# Patient Record
Sex: FEMALE | Race: WHITE | Employment: FULL TIME | URBAN - METROPOLITAN AREA
[De-identification: names, ages, dates, MRNs, and addresses within clinical notes are randomized per-mention and may not be internally consistent; named-entity substitution may affect disease eponyms.]

---

## 2017-01-18 NOTE — LETTER
3/14/2022          To Whom It May Concern:    Eda Scales is currently under my medical care and may not return to work at this time. Please excuse Jing for 3 days. She may return to work on March 17, 2022. If you require additional information please contact our office.         Sincerely,    Amrik Tellez MD          Document generated by:  Amrik Tellez MD
detailed exam

## 2017-02-01 RX ORDER — METOPROLOL SUCCINATE 25 MG/1
TABLET, EXTENDED RELEASE ORAL
Qty: 30 TABLET | Refills: 0 | Status: SHIPPED | OUTPATIENT
Start: 2017-02-01 | End: 2017-03-13

## 2017-02-06 ENCOUNTER — OFFICE VISIT (OUTPATIENT)
Dept: SURGERY | Facility: CLINIC | Age: 62
End: 2017-02-06

## 2017-02-06 VITALS
RESPIRATION RATE: 18 BRPM | HEART RATE: 91 BPM | WEIGHT: 268.44 LBS | BODY MASS INDEX: 52.7 KG/M2 | SYSTOLIC BLOOD PRESSURE: 150 MMHG | HEIGHT: 60 IN | DIASTOLIC BLOOD PRESSURE: 80 MMHG | OXYGEN SATURATION: 97 %

## 2017-02-06 DIAGNOSIS — R63.2 BINGE EATING: ICD-10-CM

## 2017-02-06 DIAGNOSIS — R73.03 PRE-DIABETES: ICD-10-CM

## 2017-02-06 DIAGNOSIS — I10 ESSENTIAL HYPERTENSION: Primary | ICD-10-CM

## 2017-02-06 DIAGNOSIS — G47.33 OSA (OBSTRUCTIVE SLEEP APNEA): ICD-10-CM

## 2017-02-06 DIAGNOSIS — Z51.81 ENCOUNTER FOR THERAPEUTIC DRUG MONITORING: ICD-10-CM

## 2017-02-06 DIAGNOSIS — E66.01 MORBID OBESITY WITH BMI OF 50.0-59.9, ADULT (HCC): ICD-10-CM

## 2017-02-06 DIAGNOSIS — E78.00 HYPERCHOLESTEROLEMIA: ICD-10-CM

## 2017-02-06 PROCEDURE — 99214 OFFICE O/P EST MOD 30 MIN: CPT | Performed by: INTERNAL MEDICINE

## 2017-02-12 ENCOUNTER — LAB ENCOUNTER (OUTPATIENT)
Dept: LAB | Facility: HOSPITAL | Age: 62
End: 2017-02-12
Attending: INTERNAL MEDICINE
Payer: COMMERCIAL

## 2017-02-12 DIAGNOSIS — I10 ESSENTIAL HYPERTENSION: ICD-10-CM

## 2017-02-12 DIAGNOSIS — R73.01 IMPAIRED FASTING BLOOD SUGAR: ICD-10-CM

## 2017-02-12 DIAGNOSIS — Z51.81 ENCOUNTER FOR THERAPEUTIC DRUG MONITORING: ICD-10-CM

## 2017-02-12 DIAGNOSIS — G47.33 OSA (OBSTRUCTIVE SLEEP APNEA): ICD-10-CM

## 2017-02-12 DIAGNOSIS — E03.9 HYPOTHYROIDISM, UNSPECIFIED TYPE: ICD-10-CM

## 2017-02-12 DIAGNOSIS — E66.01 MORBID OBESITY WITH BMI OF 50.0-59.9, ADULT (HCC): ICD-10-CM

## 2017-02-12 DIAGNOSIS — E78.00 HYPERCHOLESTEROLEMIA: ICD-10-CM

## 2017-02-12 DIAGNOSIS — R73.03 PRE-DIABETES: ICD-10-CM

## 2017-02-12 DIAGNOSIS — E78.00 HYPERCHOLESTEREMIA: ICD-10-CM

## 2017-02-12 DIAGNOSIS — R63.2 BINGE EATING: ICD-10-CM

## 2017-02-12 LAB
ALBUMIN SERPL BCP-MCNC: 3.6 G/DL (ref 3.5–4.8)
ALBUMIN/GLOB SERPL: 1.1 {RATIO} (ref 1–2)
ALP SERPL-CCNC: 62 U/L (ref 32–100)
ALT SERPL-CCNC: 23 U/L (ref 14–54)
ANION GAP SERPL CALC-SCNC: 9 MMOL/L (ref 0–18)
AST SERPL-CCNC: 21 U/L (ref 15–41)
BASOPHILS # BLD: 0 K/UL (ref 0–0.2)
BASOPHILS NFR BLD: 0 %
BILIRUB SERPL-MCNC: 1.1 MG/DL (ref 0.3–1.2)
BILIRUB UR QL: NEGATIVE
BUN SERPL-MCNC: 17 MG/DL (ref 8–20)
BUN/CREAT SERPL: 25 (ref 10–20)
CALCIUM SERPL-MCNC: 8.9 MG/DL (ref 8.5–10.5)
CHLORIDE SERPL-SCNC: 100 MMOL/L (ref 95–110)
CHOLEST SERPL-MCNC: 192 MG/DL (ref 110–200)
CLARITY UR: CLEAR
CO2 SERPL-SCNC: 32 MMOL/L (ref 22–32)
COLOR UR: YELLOW
CREAT SERPL-MCNC: 0.68 MG/DL (ref 0.5–1.5)
CREAT UR-MCNC: 37.9 MG/DL
EOSINOPHIL # BLD: 0.1 K/UL (ref 0–0.7)
EOSINOPHIL NFR BLD: 2 %
ERYTHROCYTE [DISTWIDTH] IN BLOOD BY AUTOMATED COUNT: 14.5 % (ref 11–15)
GLOBULIN PLAS-MCNC: 3.4 G/DL (ref 2.5–3.7)
GLUCOSE SERPL-MCNC: 122 MG/DL (ref 70–99)
GLUCOSE UR-MCNC: NEGATIVE MG/DL
HBA1C MFR BLD: 6.6 % (ref 4–6)
HCT VFR BLD AUTO: 41.8 % (ref 35–48)
HDLC SERPL-MCNC: 41 MG/DL
HGB BLD-MCNC: 14 G/DL (ref 12–16)
HGB UR QL STRIP.AUTO: NEGATIVE
KETONES UR-MCNC: NEGATIVE MG/DL
LDLC SERPL CALC-MCNC: 126 MG/DL (ref 0–99)
LEUKOCYTE ESTERASE UR QL STRIP.AUTO: NEGATIVE
LYMPHOCYTES # BLD: 1.9 K/UL (ref 1–4)
LYMPHOCYTES NFR BLD: 21 %
MCH RBC QN AUTO: 29.9 PG (ref 27–32)
MCHC RBC AUTO-ENTMCNC: 33.5 G/DL (ref 32–37)
MCV RBC AUTO: 89.3 FL (ref 80–100)
MICROALBUMIN UR-MCNC: 0.9 MG/DL (ref 0–1.8)
MICROALBUMIN/CREAT UR: 23.7 MG/G{CREAT} (ref 0–20)
MONOCYTES # BLD: 0.5 K/UL (ref 0–1)
MONOCYTES NFR BLD: 5 %
NEUTROPHILS # BLD AUTO: 6.4 K/UL (ref 1.8–7.7)
NEUTROPHILS NFR BLD: 72 %
NITRITE UR QL STRIP.AUTO: NEGATIVE
NONHDLC SERPL-MCNC: 151 MG/DL
OSMOLALITY UR CALC.SUM OF ELEC: 295 MOSM/KG (ref 275–295)
PH UR: 7 [PH] (ref 5–8)
PLATELET # BLD AUTO: 252 K/UL (ref 140–400)
PMV BLD AUTO: 8.5 FL (ref 7.4–10.3)
POTASSIUM SERPL-SCNC: 3.5 MMOL/L (ref 3.3–5.1)
PROT SERPL-MCNC: 7 G/DL (ref 5.9–8.4)
PROT UR-MCNC: NEGATIVE MG/DL
RBC # BLD AUTO: 4.68 M/UL (ref 3.7–5.4)
SODIUM SERPL-SCNC: 141 MMOL/L (ref 136–144)
SP GR UR STRIP: 1.01 (ref 1–1.03)
TRIGL SERPL-MCNC: 124 MG/DL (ref 1–149)
TSH SERPL-ACNC: 3.24 UIU/ML (ref 0.34–5.6)
TSH SERPL-ACNC: 3.24 UIU/ML (ref 0.34–5.6)
UROBILINOGEN UR STRIP-ACNC: <2
VIT B12 SERPL-MCNC: 1288 PG/ML (ref 181–914)
VIT C UR-MCNC: NEGATIVE MG/DL
WBC # BLD AUTO: 8.9 K/UL (ref 4–11)

## 2017-02-12 PROCEDURE — 82570 ASSAY OF URINE CREATININE: CPT

## 2017-02-12 PROCEDURE — 82306 VITAMIN D 25 HYDROXY: CPT

## 2017-02-12 PROCEDURE — 80053 COMPREHEN METABOLIC PANEL: CPT

## 2017-02-12 PROCEDURE — 82043 UR ALBUMIN QUANTITATIVE: CPT

## 2017-02-12 PROCEDURE — 36415 COLL VENOUS BLD VENIPUNCTURE: CPT

## 2017-02-12 PROCEDURE — 84443 ASSAY THYROID STIM HORMONE: CPT

## 2017-02-12 PROCEDURE — 83036 HEMOGLOBIN GLYCOSYLATED A1C: CPT

## 2017-02-12 PROCEDURE — 81003 URINALYSIS AUTO W/O SCOPE: CPT

## 2017-02-12 PROCEDURE — 80061 LIPID PANEL: CPT

## 2017-02-12 PROCEDURE — 85025 COMPLETE CBC W/AUTO DIFF WBC: CPT

## 2017-02-12 PROCEDURE — 82607 VITAMIN B-12: CPT

## 2017-02-13 ENCOUNTER — TELEPHONE (OUTPATIENT)
Dept: NEPHROLOGY | Facility: CLINIC | Age: 62
End: 2017-02-13

## 2017-02-13 LAB — 25(OH)D3 SERPL-MCNC: 48.4 NG/ML

## 2017-02-14 RX ORDER — ROSUVASTATIN CALCIUM 20 MG/1
20 TABLET, COATED ORAL DAILY
Qty: 30 TABLET | Refills: 0 | Status: SHIPPED | OUTPATIENT
Start: 2017-02-14 | End: 2017-02-14

## 2017-02-14 NOTE — TELEPHONE ENCOUNTER
Contacted pt and advised her to go back on Crestor 20 mg daily per Dr. Ayana Hammond. Pt states she did not experience side effects while taking Crestor. Confirmed pharmacy with pt. Rx sent.

## 2017-02-14 NOTE — TELEPHONE ENCOUNTER
Attempted to contact pt at the number below and we redirected to [phone]. Was on hold for 5 minutes and then got disconnected.

## 2017-02-14 NOTE — TELEPHONE ENCOUNTER
Attempted to contact pt @ [phone]. Let phone right for 2.5 minutes and no one answered. I got the automated prompt Charity [address]'s office) and then it just rings, no one picked up.

## 2017-02-15 RX ORDER — ROSUVASTATIN CALCIUM 20 MG/1
TABLET, COATED ORAL
Qty: 90 TABLET | Refills: 1 | Status: SHIPPED | OUTPATIENT
Start: 2017-02-15 | End: 2017-11-01

## 2017-02-20 ENCOUNTER — TELEPHONE (OUTPATIENT)
Dept: NEPHROLOGY | Facility: CLINIC | Age: 62
End: 2017-02-20

## 2017-02-20 NOTE — TELEPHONE ENCOUNTER
There is really no such thing. Can take a One-A-Day multiple vitamin. I see pulmonary ordered a sleep study a couple years ago. Did she ever do it. If not she should do a sleep study to rule out sleep apnea. This can cause chronic fatigue.

## 2017-02-20 NOTE — TELEPHONE ENCOUNTER
Pt called and would like to start on some type of vitamin for her to have energy. Pt stated that she feels drained.  Please call

## 2017-02-21 NOTE — TELEPHONE ENCOUNTER
Tried to call patient back at the phone# she requested but line kept ringing and no one answered. Will try later.

## 2017-02-21 NOTE — TELEPHONE ENCOUNTER
Have tried to return call to patient on the phone# she requested office to use but after a recorded message the phone rings and no one answers.

## 2017-02-24 ENCOUNTER — OFFICE VISIT (OUTPATIENT)
Dept: NEPHROLOGY | Facility: CLINIC | Age: 62
End: 2017-02-24

## 2017-02-24 VITALS
BODY MASS INDEX: 53.95 KG/M2 | HEART RATE: 97 BPM | WEIGHT: 267.63 LBS | SYSTOLIC BLOOD PRESSURE: 117 MMHG | HEIGHT: 59 IN | DIASTOLIC BLOOD PRESSURE: 76 MMHG

## 2017-02-24 DIAGNOSIS — G47.33 OBSTRUCTIVE SLEEP APNEA SYNDROME: Primary | ICD-10-CM

## 2017-02-24 DIAGNOSIS — E78.00 HYPERCHOLESTEROLEMIA: ICD-10-CM

## 2017-02-24 DIAGNOSIS — E66.9 OBESITY, UNSPECIFIED OBESITY SEVERITY, UNSPECIFIED OBESITY TYPE: ICD-10-CM

## 2017-02-24 PROCEDURE — 99212 OFFICE O/P EST SF 10 MIN: CPT | Performed by: INTERNAL MEDICINE

## 2017-02-24 PROCEDURE — 99215 OFFICE O/P EST HI 40 MIN: CPT | Performed by: INTERNAL MEDICINE

## 2017-02-25 NOTE — PROGRESS NOTES
HPI:    Patient ID: Sung Rodrigez is a 63year old female.     HPI Comments: 82-year-old female with a history of hypertension, hypercholesterolemia, borderline blood sugars, hypothyroidism, COPD, mild untreated sleep apnea, chronic low back pain with inte (VOLTAREN) 1 % Transdermal Gel Apply 2 g topically 4 (four) times daily as needed. Disp: 5 Tube Rfl: 1   selenium sulfide (SELSUN) 2.5 % Apply Externally Lotion Apply topically 2 times every week to affected area(s).  Disp: 118 mL Rfl: 10     Allergies:No K in this encounter.        Meds This Visit:  No prescriptions requested or ordered in this encounter    Imaging & Referrals:  [facility] REFERRAL DIAGOSTIC SLEEP STUDY ADULT         ID#[id number]

## 2017-03-09 ENCOUNTER — OFFICE VISIT (OUTPATIENT)
Dept: SURGERY | Facility: CLINIC | Age: 62
End: 2017-03-09

## 2017-03-09 VITALS
WEIGHT: 265.19 LBS | DIASTOLIC BLOOD PRESSURE: 78 MMHG | HEIGHT: 59 IN | SYSTOLIC BLOOD PRESSURE: 120 MMHG | BODY MASS INDEX: 53.46 KG/M2

## 2017-03-09 DIAGNOSIS — E78.00 PURE HYPERCHOLESTEROLEMIA: ICD-10-CM

## 2017-03-09 DIAGNOSIS — E66.01 MORBID OBESITY WITH BMI OF 50.0-59.9, ADULT (HCC): ICD-10-CM

## 2017-03-09 DIAGNOSIS — Z51.81 ENCOUNTER FOR THERAPEUTIC DRUG MONITORING: ICD-10-CM

## 2017-03-09 DIAGNOSIS — G47.33 OSA (OBSTRUCTIVE SLEEP APNEA): ICD-10-CM

## 2017-03-09 DIAGNOSIS — R63.2 BINGE EATING: ICD-10-CM

## 2017-03-09 DIAGNOSIS — I10 ESSENTIAL HYPERTENSION: ICD-10-CM

## 2017-03-09 DIAGNOSIS — E11.9 TYPE 2 DIABETES MELLITUS WITHOUT COMPLICATION, WITHOUT LONG-TERM CURRENT USE OF INSULIN (HCC): Primary | ICD-10-CM

## 2017-03-09 PROCEDURE — 99214 OFFICE O/P EST MOD 30 MIN: CPT | Performed by: INTERNAL MEDICINE

## 2017-03-13 RX ORDER — METOPROLOL SUCCINATE 25 MG/1
TABLET, EXTENDED RELEASE ORAL
Qty: 90 TABLET | Refills: 1 | Status: SHIPPED | OUTPATIENT
Start: 2017-03-13 | End: 2017-12-23

## 2017-03-13 RX ORDER — LEVOTHYROXINE SODIUM 0.15 MG/1
TABLET ORAL
Qty: 90 TABLET | Refills: 1 | Status: SHIPPED | OUTPATIENT
Start: 2017-03-13 | End: 2017-12-23

## 2017-03-13 RX ORDER — LOSARTAN POTASSIUM AND HYDROCHLOROTHIAZIDE 25; 100 MG/1; MG/1
TABLET ORAL
Qty: 90 TABLET | Refills: 1 | Status: SHIPPED | OUTPATIENT
Start: 2017-03-13 | End: 2017-12-23

## 2017-04-24 ENCOUNTER — TELEPHONE (OUTPATIENT)
Dept: NEPHROLOGY | Facility: CLINIC | Age: 62
End: 2017-04-24

## 2017-04-24 DIAGNOSIS — M79.672 LEFT FOOT PAIN: Primary | ICD-10-CM

## 2017-04-24 NOTE — TELEPHONE ENCOUNTER
Pt states that her left foot is very painful and she can't walk on it. Pain is from ankle down into foot. She is requesting order for xray. Please call. Aware MKK not in office.
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
## 2017-04-30 ENCOUNTER — HOSPITAL ENCOUNTER (OUTPATIENT)
Dept: GENERAL RADIOLOGY | Facility: HOSPITAL | Age: 62
Discharge: HOME | End: 2017-04-30
Attending: INTERNAL MEDICINE
Payer: COMMERCIAL

## 2017-04-30 DIAGNOSIS — M79.672 LEFT FOOT PAIN: ICD-10-CM

## 2017-04-30 PROCEDURE — 73630 X-RAY EXAM OF FOOT: CPT

## 2017-05-01 ENCOUNTER — TELEPHONE (OUTPATIENT)
Dept: NEPHROLOGY | Facility: CLINIC | Age: 62
End: 2017-05-01

## 2017-05-01 DIAGNOSIS — M77.9 BONE SPUR: Primary | ICD-10-CM

## 2017-05-02 NOTE — TELEPHONE ENCOUNTER
Patient returned call. Dr. Lory Gomez results message read. Patient states she would like to see Podiatry. Referral sent to [facility]. Phone number provided.

## 2017-05-11 ENCOUNTER — TELEPHONE (OUTPATIENT)
Dept: INTERNAL MEDICINE CLINIC | Facility: CLINIC | Age: 62
End: 2017-05-11

## 2017-05-11 DIAGNOSIS — Z01.00 DIABETIC EYE EXAM (HCC): Primary | ICD-10-CM

## 2017-05-11 DIAGNOSIS — E11.9 DIABETIC EYE EXAM (HCC): Primary | ICD-10-CM

## 2017-06-01 ENCOUNTER — OFFICE VISIT (OUTPATIENT)
Dept: SURGERY | Facility: CLINIC | Age: 62
End: 2017-06-01

## 2017-06-01 VITALS
HEIGHT: 59 IN | DIASTOLIC BLOOD PRESSURE: 88 MMHG | SYSTOLIC BLOOD PRESSURE: 130 MMHG | BODY MASS INDEX: 53.26 KG/M2 | WEIGHT: 264.19 LBS

## 2017-06-01 DIAGNOSIS — Z51.81 ENCOUNTER FOR THERAPEUTIC DRUG MONITORING: ICD-10-CM

## 2017-06-01 DIAGNOSIS — I10 ESSENTIAL HYPERTENSION: Primary | ICD-10-CM

## 2017-06-01 DIAGNOSIS — E11.9 TYPE 2 DIABETES MELLITUS WITHOUT COMPLICATION, WITHOUT LONG-TERM CURRENT USE OF INSULIN (HCC): ICD-10-CM

## 2017-06-01 DIAGNOSIS — E66.01 MORBID OBESITY WITH BMI OF 50.0-59.9, ADULT (HCC): ICD-10-CM

## 2017-06-01 DIAGNOSIS — R63.2 BINGE EATING: ICD-10-CM

## 2017-06-01 DIAGNOSIS — G47.33 OSA (OBSTRUCTIVE SLEEP APNEA): ICD-10-CM

## 2017-06-01 PROCEDURE — 99214 OFFICE O/P EST MOD 30 MIN: CPT | Performed by: INTERNAL MEDICINE

## 2017-06-01 NOTE — PROGRESS NOTES
[address] [place] [address] BARIATRIC AND WEIGHT LOSS CLINIC  [address] [place] [address]  Dept: [phone]     Date:   2016    Patient:  Jeff Gaspar  :      10/24/1955  MRN:      [id number]    Chief Complaint:  Patient presents with: BREAKFAST. Disp: 90 tablet Rfl: 1   Lisdexamfetamine Dimesylate (VYVANSE) 60 MG Oral Cap Take 1 capsule (60 mg total) by mouth every morning.  Disp: 30 capsule Rfl: 0   MetFORMIN HCl 500 MG Oral Tab Take 1 tablet (500 mg total) by mouth 2 (two) times daily Diabetes Neg    • Glaucoma Neg    • Retinal detachment Neg    • Macular degeneration Neg    • Cancer Neg        Food Journal  · Reviewed and Discussed:       · Patient has a Food Journal?: yes   · Patient is reading nutrition labels?   yes  · Average Calori organomegaly  Extremities: extremities normal, atraumatic, no cyanosis or edema  Pulses: 2+ and symmetric  Skin: Skin color, texture, turgor normal. No rashes or lesions    ASSESSMENT     HYPERTENSION:  The patient's blood pressure has been well controlled day. No fruit juices or regular soda. 3. Increase activity-upper body exercises, walk 10 minutes per day. 4. Increase fruit and vegetable servings to 5-6 per day.       Tolerating vyvanse  Will refill at 60 mg    Blood work done    johnnie tolerating well

## 2017-06-22 ENCOUNTER — TELEPHONE (OUTPATIENT)
Dept: INTERNAL MEDICINE CLINIC | Facility: CLINIC | Age: 62
End: 2017-06-22

## 2017-06-22 NOTE — TELEPHONE ENCOUNTER
Patients spouse informed is due for diabetic eye exam appointment and given [id number] for call back. States will relay message.

## 2017-08-07 ENCOUNTER — TELEPHONE (OUTPATIENT)
Dept: SURGERY | Facility: CLINIC | Age: 62
End: 2017-08-07

## 2017-08-21 ENCOUNTER — OFFICE VISIT (OUTPATIENT)
Dept: SURGERY | Facility: CLINIC | Age: 62
End: 2017-08-21

## 2017-08-21 VITALS
DIASTOLIC BLOOD PRESSURE: 90 MMHG | WEIGHT: 258.06 LBS | HEIGHT: 59 IN | SYSTOLIC BLOOD PRESSURE: 160 MMHG | BODY MASS INDEX: 52.02 KG/M2

## 2017-08-21 DIAGNOSIS — I10 ESSENTIAL HYPERTENSION: ICD-10-CM

## 2017-08-21 DIAGNOSIS — R63.2 BINGE EATING: Primary | ICD-10-CM

## 2017-08-21 DIAGNOSIS — E78.00 HYPERCHOLESTEROLEMIA: ICD-10-CM

## 2017-08-21 DIAGNOSIS — G47.33 OSA (OBSTRUCTIVE SLEEP APNEA): ICD-10-CM

## 2017-08-21 DIAGNOSIS — E66.01 MORBID OBESITY WITH BMI OF 50.0-59.9, ADULT (HCC): ICD-10-CM

## 2017-08-21 DIAGNOSIS — E11.9 TYPE 2 DIABETES MELLITUS WITHOUT COMPLICATION, WITHOUT LONG-TERM CURRENT USE OF INSULIN (HCC): ICD-10-CM

## 2017-08-21 PROCEDURE — 99214 OFFICE O/P EST MOD 30 MIN: CPT | Performed by: INTERNAL MEDICINE

## 2017-08-21 NOTE — PROGRESS NOTES
[address] BARIATRIC AND WEIGHT LOSS CLINIC  [address] [place] [address]  Dept: [phone]     Date:   2016    Patient:  Jayy Du  :      10/24/1955  MRN:      [id number]    Chief Complaint:  Patient presents with: Disp: 90 tablet Rfl: 1   LOSARTAN POTASSIUM-HCTZ 100-25 MG Oral Tab TAKE 1 TABLET BY MOUTH DAILY Disp: 90 tablet Rfl: 1   LEVOTHYROXINE SODIUM 150 MCG Oral Tab TAKE 1 TABLET BY MOUTH DAILY BEFORE BREAKFAST.  Disp: 90 tablet Rfl: 1   MetFORMIN HCl 500 MG Ora Problem Relation Age of Onset   • Hypertension Father    • Heart Disease Father    • Diabetes Neg    • Glaucoma Neg    • Retinal detachment Neg    • Macular degeneration Neg    • Cancer Neg        Food Journal  · Reviewed and Discussed:       · Patient h rhythm  Abdomen: soft, non-tender; bowel sounds normal; no masses,  no organomegaly  Extremities: extremities normal, atraumatic, no cyanosis or edema  Pulses: 2+ and symmetric  Skin: Skin color, texture, turgor normal. No rashes or lesions    ASSESSMENT log.  2. Drink 48-64 ounces of non-caloric beverages per day. No fruit juices or regular soda. 3. Increase activity-upper body exercises, walk 10 minutes per day. 4. Increase fruit and vegetable servings to 5-6 per day.       Tolerating vyvanse  Will refi

## 2017-09-21 ENCOUNTER — TELEPHONE (OUTPATIENT)
Dept: SURGERY | Facility: CLINIC | Age: 62
End: 2017-09-21

## 2017-09-21 NOTE — TELEPHONE ENCOUNTER
9/21/17 @ 4:03pm LM for pt to call PCP and request additional visits for Dr. Althea Cook appt. on 10/19/17.

## 2017-10-05 ENCOUNTER — TELEPHONE (OUTPATIENT)
Dept: NEPHROLOGY | Facility: CLINIC | Age: 62
End: 2017-10-05

## 2017-10-05 DIAGNOSIS — E66.3 OVERWEIGHT: ICD-10-CM

## 2017-10-05 DIAGNOSIS — Z12.31 VISIT FOR SCREENING MAMMOGRAM: Primary | ICD-10-CM

## 2017-10-05 DIAGNOSIS — L21.0 DANDRUFF, ADULT: ICD-10-CM

## 2017-10-05 DIAGNOSIS — E78.00 PURE HYPERCHOLESTEROLEMIA: ICD-10-CM

## 2017-10-05 DIAGNOSIS — R21 RASH OF FACE: Primary | ICD-10-CM

## 2017-10-05 NOTE — TELEPHONE ENCOUNTER
Patient contacted. Advised the lab orders, mammogram order and referrals have been entered in system. Scheduling phone3 provided and patient already has an appointment with Dr. Dawit Whitehead set up for 10/23/17.

## 2017-10-05 NOTE — TELEPHONE ENCOUNTER
Pt requesting referral to see Dermatologist re: rash on face & issues with dandruff. Pls call. Thank you.

## 2017-10-05 NOTE — TELEPHONE ENCOUNTER
Referral forms signed by Dr. Miracle Lu and sent to [facility] for insurance authorization.

## 2017-10-23 ENCOUNTER — HOSPITAL ENCOUNTER (OUTPATIENT)
Dept: MAMMOGRAPHY | Age: 62
Discharge: HOME | End: 2017-10-23
Attending: INTERNAL MEDICINE
Payer: COMMERCIAL

## 2017-10-23 DIAGNOSIS — Z12.31 VISIT FOR SCREENING MAMMOGRAM: ICD-10-CM

## 2017-10-23 PROCEDURE — 77067 SCR MAMMO BI INCL CAD: CPT | Performed by: INTERNAL MEDICINE

## 2017-10-26 ENCOUNTER — APPOINTMENT (OUTPATIENT)
Dept: LAB | Age: 62
End: 2017-10-26
Attending: INTERNAL MEDICINE
Payer: COMMERCIAL

## 2017-10-26 DIAGNOSIS — E78.00 PURE HYPERCHOLESTEROLEMIA: ICD-10-CM

## 2017-10-26 PROCEDURE — 83036 HEMOGLOBIN GLYCOSYLATED A1C: CPT

## 2017-10-26 PROCEDURE — 80061 LIPID PANEL: CPT

## 2017-10-26 PROCEDURE — 80053 COMPREHEN METABOLIC PANEL: CPT

## 2017-10-26 PROCEDURE — 36415 COLL VENOUS BLD VENIPUNCTURE: CPT

## 2017-10-29 ENCOUNTER — TELEPHONE (OUTPATIENT)
Dept: NEPHROLOGY | Facility: CLINIC | Age: 62
End: 2017-10-29

## 2017-11-01 ENCOUNTER — OFFICE VISIT (OUTPATIENT)
Dept: NEPHROLOGY | Facility: CLINIC | Age: 62
End: 2017-11-01

## 2017-11-01 VITALS
BODY MASS INDEX: 57.6 KG/M2 | HEART RATE: 102 BPM | SYSTOLIC BLOOD PRESSURE: 111 MMHG | WEIGHT: 267 LBS | DIASTOLIC BLOOD PRESSURE: 70 MMHG | HEIGHT: 57 IN

## 2017-11-01 DIAGNOSIS — E78.00 PURE HYPERCHOLESTEROLEMIA: ICD-10-CM

## 2017-11-01 DIAGNOSIS — IMO0001 CLASS 3 OBESITY IN ADULT, UNSPECIFIED BMI, UNSPECIFIED OBESITY TYPE, UNSPECIFIED WHETHER SERIOUS COMORBIDITY PRESENT: ICD-10-CM

## 2017-11-01 DIAGNOSIS — E11.9 TYPE 2 DIABETES MELLITUS WITHOUT COMPLICATION, WITHOUT LONG-TERM CURRENT USE OF INSULIN (HCC): Primary | ICD-10-CM

## 2017-11-01 PROCEDURE — 99215 OFFICE O/P EST HI 40 MIN: CPT | Performed by: INTERNAL MEDICINE

## 2017-11-01 PROCEDURE — 99212 OFFICE O/P EST SF 10 MIN: CPT | Performed by: INTERNAL MEDICINE

## 2017-11-01 RX ORDER — ROSUVASTATIN CALCIUM 20 MG/1
TABLET, COATED ORAL
Qty: 90 TABLET | Refills: 1 | Status: SHIPPED | OUTPATIENT
Start: 2017-11-01 | End: 2019-02-25

## 2017-11-02 NOTE — PATIENT INSTRUCTIONS
Decrease metformin to 500 mg daily with dinner. Resume Crestor.   In 1 month repeat your follow-up blood test.

## 2017-11-02 NOTE — PROGRESS NOTES
[facility]  Nephrology Daily Progress Note    Kendall Del Angel  [id number]  63year old  Patient presents with: Annual      HPI:   Kendall Del Angel is a 63year old female.   72-year-old female with a history of hypertension, hypercholesterolemia, adult o hydrated alert and responsive no acute distress noted  Neck/Thyroid: neck is supple without adenopathy  Lymphatic: no abnormal cervical, supraclavicular or axillary adenopathy is noted  Respiratory: normal to inspection lungs are clear to auscultation bila 1  •  LOSARTAN POTASSIUM-HCTZ 100-25 MG Oral Tab, TAKE 1 TABLET BY MOUTH DAILY, Disp: 90 tablet, Rfl: 1  •  LEVOTHYROXINE SODIUM 150 MCG Oral Tab, TAKE 1 TABLET BY MOUTH DAILY BEFORE BREAKFAST., Disp: 90 tablet, Rfl: 1  •  Insulin Pen Needle (NOVOFINE) 32G long-term current use of insulin ([facility] [address]      Weight is the same as it was back in February 2017. Reviewed recent labs from October 26, 2017. Fasting blood sugar 126 with an A1c of 6.6. Potassium 3.2. Cholesterol 228 with an LDL of 146.   Advised to resum

## 2017-11-06 ENCOUNTER — OFFICE VISIT (OUTPATIENT)
Dept: SURGERY | Facility: CLINIC | Age: 62
End: 2017-11-06

## 2017-11-06 ENCOUNTER — OFFICE VISIT (OUTPATIENT)
Dept: DERMATOLOGY CLINIC | Facility: CLINIC | Age: 62
End: 2017-11-06

## 2017-11-06 VITALS
WEIGHT: 262.56 LBS | HEIGHT: 59 IN | SYSTOLIC BLOOD PRESSURE: 130 MMHG | BODY MASS INDEX: 52.93 KG/M2 | DIASTOLIC BLOOD PRESSURE: 80 MMHG

## 2017-11-06 DIAGNOSIS — R63.2 BINGE EATING: Primary | ICD-10-CM

## 2017-11-06 DIAGNOSIS — G47.33 OSA (OBSTRUCTIVE SLEEP APNEA): ICD-10-CM

## 2017-11-06 DIAGNOSIS — E66.01 MORBID OBESITY WITH BMI OF 50.0-59.9, ADULT (HCC): ICD-10-CM

## 2017-11-06 DIAGNOSIS — E78.00 PURE HYPERCHOLESTEROLEMIA: ICD-10-CM

## 2017-11-06 DIAGNOSIS — L21.9 SEBORRHEIC DERMATITIS: Primary | ICD-10-CM

## 2017-11-06 DIAGNOSIS — Z51.81 ENCOUNTER FOR THERAPEUTIC DRUG MONITORING: ICD-10-CM

## 2017-11-06 DIAGNOSIS — L30.9 DERMATITIS: ICD-10-CM

## 2017-11-06 DIAGNOSIS — D23.9 BENIGN NEOPLASM OF SKIN, UNSPECIFIED LOCATION: ICD-10-CM

## 2017-11-06 DIAGNOSIS — I10 ESSENTIAL HYPERTENSION: ICD-10-CM

## 2017-11-06 DIAGNOSIS — E11.9 TYPE 2 DIABETES MELLITUS WITHOUT COMPLICATION, WITHOUT LONG-TERM CURRENT USE OF INSULIN (HCC): ICD-10-CM

## 2017-11-06 PROCEDURE — 99214 OFFICE O/P EST MOD 30 MIN: CPT | Performed by: NURSE PRACTITIONER

## 2017-11-06 PROCEDURE — 99213 OFFICE O/P EST LOW 20 MIN: CPT | Performed by: DERMATOLOGY

## 2017-11-06 PROCEDURE — 99212 OFFICE O/P EST SF 10 MIN: CPT | Performed by: DERMATOLOGY

## 2017-11-06 RX ORDER — KETOCONAZOLE 20 MG/ML
SHAMPOO TOPICAL
Qty: 120 ML | Refills: 3 | Status: SHIPPED | OUTPATIENT
Start: 2017-11-06 | End: 2018-07-23

## 2017-11-06 RX ORDER — HYDROCORTISONE 25 MG/ML
LOTION TOPICAL
Qty: 60 ML | Refills: 0 | Status: SHIPPED | OUTPATIENT
Start: 2017-11-06 | End: 2018-07-23

## 2017-11-06 RX ORDER — KETOCONAZOLE 20 MG/G
CREAM TOPICAL
Qty: 30 G | Refills: 3 | Status: SHIPPED | OUTPATIENT
Start: 2017-11-06 | End: 2018-07-23

## 2017-11-06 NOTE — PROGRESS NOTES
[address] BARIATRIC AND WEIGHT LOSS CLINIC  [address] [place] [address]  Dept: [phone]     Date:   17    Patient:  Oralia Alfonso  :      10/24/1955  MRN:      [id number]    Chief Complaint:  Patient presents with:   Fo MORENO MICROLET LANCETS Does not apply Misc Use 1 lancet daily to check blood sugar Disp: 100 each Rfl: 1   Rosuvastatin Calcium 20 MG Oral Tab TAKE 1 TABLET(20 MG) BY MOUTH DAILY Disp: 90 tablet Rfl: 1   MetFORMIN HCl 500 MG Oral Tab Take 1 tablet (500 m Hypertension Father    • Heart Disease Father    • Heart Disorder Mother    • Heart Disease Mother    • Diabetes Neg    • Glaucoma Neg    • Retinal detachment Neg    • Macular degeneration Neg    • Cancer Neg        Food Journal  · Reviewed and Discussed: auscultation bilaterally  Heart: S1, S2 normal, no murmur, click, rub or gallop, regular rate and rhythm  Abdomen: soft, non-tender; bowel sounds normal; no masses,  no organomegaly  Extremities: extremities normal, atraumatic, no cyanosis or edema  Pulses referral    DYSLIPIDEMIA: Stable on the above prescribed meal plan and medication. Liver function stable.       Lab Results  Component Value Date/Time   CHOLEST 228 (H) 10/26/2017 09:12 AM    (H) 10/26/2017 09:12 AM   HDL 45 10/26/2017 09:12 AM   TRI

## 2017-11-06 NOTE — PATIENT INSTRUCTIONS
Understanding Seborrheic Dermatitis    Seborrheic dermatitis is a common type of rash that causes red, scaly, greasy skin.  It occurs on skin that has oil glands, such as the face, scalp, and upper chest. It tends to last a long time, or go away and come Wash your skin gently. You can remove scales with oil and gentle rubbing or a brush. Living with seborrheic dermatitis  Seborrheic dermatitis is an ongoing (chronic) condition. It can go away and then come back.  You will likely need to use shampoo, cream,
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
## 2017-11-21 ENCOUNTER — OFFICE VISIT (OUTPATIENT)
Dept: OBGYN CLINIC | Facility: CLINIC | Age: 62
End: 2017-11-21

## 2017-11-21 VITALS
SYSTOLIC BLOOD PRESSURE: 153 MMHG | BODY MASS INDEX: 51.28 KG/M2 | HEIGHT: 60 IN | HEART RATE: 82 BPM | WEIGHT: 261.19 LBS | DIASTOLIC BLOOD PRESSURE: 79 MMHG

## 2017-11-21 DIAGNOSIS — L90.0 LICHEN SCLEROSUS: ICD-10-CM

## 2017-11-21 DIAGNOSIS — N95.0 POSTMENOPAUSAL BLEEDING: ICD-10-CM

## 2017-11-21 DIAGNOSIS — Z12.4 CERVICAL CANCER SCREENING: ICD-10-CM

## 2017-11-21 DIAGNOSIS — Z01.419 ENCOUNTER FOR GYNECOLOGICAL EXAMINATION WITHOUT ABNORMAL FINDING: Primary | ICD-10-CM

## 2017-11-21 PROCEDURE — 99396 PREV VISIT EST AGE 40-64: CPT | Performed by: OBSTETRICS & GYNECOLOGY

## 2017-11-21 PROCEDURE — 99213 OFFICE O/P EST LOW 20 MIN: CPT | Performed by: OBSTETRICS & GYNECOLOGY

## 2017-11-21 NOTE — PROGRESS NOTES
Well Woman Exam    HPI:  The patient is a 65yo female who presents for annual exam. She states that she continues to have vulvar itching. She stopped using Triamcinolone ointment and that is when the itching started.  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       Social History Main Topics   Smoking status: Former Smoker  For 7.00 Years     Types: Cigarettes    Quit date: 7/ (MORENO CONTOUR NEXT TEST) In Vitro Strip, Check blood sugar 1 time daily, Disp: 100 strip, Rfl: 1  •  MORENO MICROLET LANCETS Does not apply Misc, Use 1 lancet daily to check blood sugar, Disp: 100 each, Rfl: 1  •  Rosuvastatin Calcium 20 MG Oral Tab, TAKE nipple discharge, nipple retraction or skin changes  Abdomen:  soft, nontender, nondistended, no masses, morbid obesity   Skin/Hair: no unusual rashes or bruises  Extremities: no edema, no cyanosis  Psychiatric: Appropriate mood and affect    Pelvic Exam: three months to check lichen   5.  Follow up in 1 year

## 2017-12-04 ENCOUNTER — OFFICE VISIT (OUTPATIENT)
Dept: SURGERY | Facility: CLINIC | Age: 62
End: 2017-12-04

## 2017-12-04 VITALS
HEART RATE: 82 BPM | SYSTOLIC BLOOD PRESSURE: 128 MMHG | TEMPERATURE: 97 F | HEIGHT: 59 IN | DIASTOLIC BLOOD PRESSURE: 74 MMHG | RESPIRATION RATE: 18 BRPM | BODY MASS INDEX: 52.98 KG/M2 | WEIGHT: 262.81 LBS | OXYGEN SATURATION: 94 %

## 2017-12-04 DIAGNOSIS — E66.01 MORBID OBESITY WITH BMI OF 50.0-59.9, ADULT (HCC): ICD-10-CM

## 2017-12-04 DIAGNOSIS — E11.9 TYPE 2 DIABETES MELLITUS WITHOUT COMPLICATION, WITHOUT LONG-TERM CURRENT USE OF INSULIN (HCC): ICD-10-CM

## 2017-12-04 DIAGNOSIS — E78.00 HYPERCHOLESTEROLEMIA: ICD-10-CM

## 2017-12-04 DIAGNOSIS — G47.33 OSA (OBSTRUCTIVE SLEEP APNEA): ICD-10-CM

## 2017-12-04 DIAGNOSIS — Z51.81 ENCOUNTER FOR THERAPEUTIC DRUG MONITORING: ICD-10-CM

## 2017-12-04 DIAGNOSIS — I10 ESSENTIAL HYPERTENSION: ICD-10-CM

## 2017-12-04 DIAGNOSIS — R63.2 BINGE EATING: Primary | ICD-10-CM

## 2017-12-04 PROCEDURE — 99214 OFFICE O/P EST MOD 30 MIN: CPT | Performed by: NURSE PRACTITIONER

## 2017-12-04 NOTE — PROGRESS NOTES
[address] BARIATRIC AND WEIGHT LOSS CLINIC  [address] [place] [address]  Dept: [phone]     Date:   17    Patient:  Jacquie Matute  :      10/24/1955  MRN:      [id number]    Chief Complaint:  Patient presents with:   Fo Pen-injector Inject 3 mg into the skin daily.  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 Patient will follow up in office every 4 weeks to fulfill 6 month medical weight management requirement, in case she decides to go the surgical route. HYPERTENSION: Blood pressure stable on the above medications.  No interval krishnamurthy

## 2017-12-06 ENCOUNTER — TELEPHONE (OUTPATIENT)
Dept: OBGYN CLINIC | Facility: CLINIC | Age: 62
End: 2017-12-06

## 2017-12-06 NOTE — TELEPHONE ENCOUNTER
LMTCB with family member. Pt needs to be informed that [address] stated your most recent pap smear and HPV were negative. [address] wants to make sure tht she will call to make an appt for an annual exam in one year.

## 2017-12-21 ENCOUNTER — OFFICE VISIT (OUTPATIENT)
Dept: OBGYN CLINIC | Facility: CLINIC | Age: 62
End: 2017-12-21

## 2017-12-21 VITALS
HEART RATE: 100 BPM | WEIGHT: 261.81 LBS | SYSTOLIC BLOOD PRESSURE: 162 MMHG | BODY MASS INDEX: 53 KG/M2 | DIASTOLIC BLOOD PRESSURE: 96 MMHG

## 2017-12-21 DIAGNOSIS — Z32.00 PREGNANCY EXAMINATION OR TEST, PREGNANCY UNCONFIRMED: Primary | ICD-10-CM

## 2017-12-21 DIAGNOSIS — N95.0 POSTMENOPAUSAL BLEEDING: ICD-10-CM

## 2017-12-21 PROCEDURE — 81025 URINE PREGNANCY TEST: CPT | Performed by: OBSTETRICS & GYNECOLOGY

## 2017-12-21 PROCEDURE — 58100 BIOPSY OF UTERUS LINING: CPT | Performed by: OBSTETRICS & GYNECOLOGY

## 2017-12-22 NOTE — PROCEDURES
Endometrial Biopsy     Pre-Procedure Care:   Consent was obtained. Procedure/risks were explained. Questions were answered. Correct patient was identified. Correct side and site were confirmed. Indication: PMB    Pre-Medications:     The patient wa

## 2017-12-26 ENCOUNTER — TELEPHONE (OUTPATIENT)
Dept: NEPHROLOGY | Facility: CLINIC | Age: 62
End: 2017-12-26

## 2017-12-26 RX ORDER — METOPROLOL SUCCINATE 25 MG/1
TABLET, EXTENDED RELEASE ORAL
Qty: 90 TABLET | Refills: 1 | Status: SHIPPED | OUTPATIENT
Start: 2017-12-26 | End: 2018-10-23

## 2017-12-26 RX ORDER — LOSARTAN POTASSIUM AND HYDROCHLOROTHIAZIDE 25; 100 MG/1; MG/1
TABLET ORAL
Qty: 90 TABLET | Refills: 1 | Status: SHIPPED | OUTPATIENT
Start: 2017-12-26 | End: 2019-05-07 | Stop reason: RX

## 2017-12-26 RX ORDER — LEVOTHYROXINE SODIUM 0.15 MG/1
TABLET ORAL
Qty: 90 TABLET | Refills: 1 | Status: SHIPPED | OUTPATIENT
Start: 2017-12-26 | End: 2018-05-09

## 2017-12-26 NOTE — TELEPHONE ENCOUNTER
Pt requesting to speak with RN, pt indicates she has a cold and would like to be seen asap. Pls call pt at:[phone],thanks. *Pt informed that Dr is out of the office today, pt requesting to see another Dr or rx for her cold.

## 2017-12-26 NOTE — TELEPHONE ENCOUNTER
Patient contacted. Dr. Ed Daniel advice relayed to see IM or UC.  Patient would prefer to see Dr. Jay Edwards (she is aware he is not in the office today) so I did book an appointment with Dr. Jay Edwards for tomorrow Wednesday 12/27/17 at 3:40PM.

## 2017-12-27 ENCOUNTER — OFFICE VISIT (OUTPATIENT)
Dept: NEPHROLOGY | Facility: CLINIC | Age: 62
End: 2017-12-27

## 2017-12-27 VITALS
TEMPERATURE: 100 F | WEIGHT: 263.31 LBS | DIASTOLIC BLOOD PRESSURE: 72 MMHG | HEIGHT: 60 IN | BODY MASS INDEX: 51.7 KG/M2 | HEART RATE: 96 BPM | SYSTOLIC BLOOD PRESSURE: 127 MMHG

## 2017-12-27 DIAGNOSIS — J20.9 BRONCHITIS, ACUTE, WITH BRONCHOSPASM: Primary | ICD-10-CM

## 2017-12-27 PROCEDURE — 99212 OFFICE O/P EST SF 10 MIN: CPT | Performed by: INTERNAL MEDICINE

## 2017-12-27 PROCEDURE — 99213 OFFICE O/P EST LOW 20 MIN: CPT | Performed by: INTERNAL MEDICINE

## 2017-12-27 RX ORDER — PROMETHAZINE HYDROCHLORIDE AND CODEINE PHOSPHATE 6.25; 1 MG/5ML; MG/5ML
2.5 SYRUP ORAL EVERY 4 HOURS PRN
Qty: 180 ML | Refills: 0 | Status: SHIPPED | OUTPATIENT
Start: 2017-12-27 | End: 2018-04-09 | Stop reason: ALTCHOICE

## 2017-12-27 RX ORDER — AZITHROMYCIN 250 MG/1
TABLET, FILM COATED ORAL
Qty: 1 PACKAGE | Refills: 0 | Status: SHIPPED | OUTPATIENT
Start: 2017-12-27 | End: 2018-02-26 | Stop reason: ALTCHOICE

## 2017-12-28 ENCOUNTER — TELEPHONE (OUTPATIENT)
Dept: NEPHROLOGY | Facility: CLINIC | Age: 62
End: 2017-12-28

## 2017-12-28 RX ORDER — ALBUTEROL SULFATE 90 UG/1
2 AEROSOL, METERED RESPIRATORY (INHALATION) 4 TIMES DAILY PRN
Qty: 1 INHALER | Refills: 1 | Status: SHIPPED | OUTPATIENT
Start: 2017-12-28 | End: 2020-02-28

## 2017-12-28 NOTE — TELEPHONE ENCOUNTER
Pt states that she thought she had albuterol inhaler at home but did not have current RX so she will need new RX. Please call. Aware MKK not in office.

## 2017-12-28 NOTE — PATIENT INSTRUCTIONS
Use your albuterol inhaler as needed. Call if not better. Do your labs when you are feeling back to your usual self.

## 2017-12-28 NOTE — PROGRESS NOTES
[facility], [facility]  Nephrology Daily Progress Note    Rosa Azevedo  [id number]  63year old  Patient presents with:  Cough/URI: x 3 days      HPI:   Rosa Azevedo is a 63year old female.   63-year-old female with a history of hypertension, hypercholesterol supraclavicular or axillary adenopathy is noted  Respiratory: normal to inspection lungs are clear to auscultation bilaterally normal respiratory effort occasional  expiratory wheezes are noted.   Cardiovascular: regular rate and rhythm no murmurs, gallups, Shampoo, Apply to scalp 2 times per week., Disp: 120 mL, Rfl: 3  •  Hydrocortisone 2.5 % External Lotion, Use qd prn redness, scaling and itching on scalp,ears, Disp: 60 mL, Rfl: 0  •  MORENO MICROLET LANCETS Does not apply Misc, Use 1 lancet daily to check (obstructive sleep apnea)     Morbid obesity with BMI of 50.0-59.9, adult (HCC)     Pre-diabetes     Binge eating     Encounter for therapeutic drug monitoring     Cough     Type 2 diabetes mellitus without complication, without long-term current use of in

## 2018-01-02 ENCOUNTER — TELEPHONE (OUTPATIENT)
Dept: NEPHROLOGY | Facility: CLINIC | Age: 63
End: 2018-01-02

## 2018-01-02 RX ORDER — AZITHROMYCIN 250 MG/1
TABLET, FILM COATED ORAL
Qty: 1 PACKAGE | Refills: 0 | Status: SHIPPED | OUTPATIENT
Start: 2018-01-02 | End: 2018-02-26 | Stop reason: ALTCHOICE

## 2018-01-02 NOTE — TELEPHONE ENCOUNTER
Pt requesting to speak with RN about her medication rx:zpack/anibotic. Pt indicates she has a persistent cough and taking lozengers. Pt requesting refill or requesting to know what else  can precrib, pls call pt at:[phone],thanks.

## 2018-01-02 NOTE — TELEPHONE ENCOUNTER
Patient contacted. Advised of refill on Z Pack but needs to see Dr. Amilcar Connolly if not better after this refill. Prescription seent to [facility].

## 2018-01-08 ENCOUNTER — OFFICE VISIT (OUTPATIENT)
Dept: SURGERY | Facility: CLINIC | Age: 63
End: 2018-01-08

## 2018-01-08 VITALS
BODY MASS INDEX: 51.06 KG/M2 | WEIGHT: 260.06 LBS | HEART RATE: 92 BPM | DIASTOLIC BLOOD PRESSURE: 65 MMHG | RESPIRATION RATE: 18 BRPM | HEIGHT: 60 IN | TEMPERATURE: 98 F | SYSTOLIC BLOOD PRESSURE: 122 MMHG | OXYGEN SATURATION: 100 %

## 2018-01-08 DIAGNOSIS — I10 ESSENTIAL HYPERTENSION: ICD-10-CM

## 2018-01-08 DIAGNOSIS — R63.2 BINGE EATING: Primary | ICD-10-CM

## 2018-01-08 DIAGNOSIS — E66.01 MORBID OBESITY WITH BMI OF 50.0-59.9, ADULT (HCC): ICD-10-CM

## 2018-01-08 DIAGNOSIS — E78.00 HYPERCHOLESTEROLEMIA: ICD-10-CM

## 2018-01-08 DIAGNOSIS — Z51.81 ENCOUNTER FOR THERAPEUTIC DRUG MONITORING: ICD-10-CM

## 2018-01-08 DIAGNOSIS — G47.33 OSA (OBSTRUCTIVE SLEEP APNEA): ICD-10-CM

## 2018-01-08 DIAGNOSIS — R73.03 PRE-DIABETES: ICD-10-CM

## 2018-01-08 PROCEDURE — 99214 OFFICE O/P EST MOD 30 MIN: CPT | Performed by: NURSE PRACTITIONER

## 2018-01-08 NOTE — PROGRESS NOTES
[address] BARIATRIC AND WEIGHT LOSS CLINIC  [address] [place] [address]  Dept: [phone]     Date:   17    Patient:  Erlinda Syed  :      10/24/1955  MRN:      [id number]    Chief Complaint:  Patient presents with:   Doni Montelongo times daily as needed for Wheezing. Disp: 1 Inhaler Rfl: 1   azithromycin (ZITHROMAX Z-MYA) 250 MG Oral Tab As directed Disp: 1 Package Rfl: 0   LEVOTHYROXINE SODIUM 150 MCG Oral Tab TAKE 1 TABLET BY MOUTH DAILY BEFORE BREAKFAST.  Disp: 90 tablet Rfl: 1   L mL Rfl: 0   Blood Glucose Monitoring Suppl ([employer] CONTOUR NEXT MONITOR) w/Device Does not apply Kit 1 each by In Vitro route daily.  Check blood sugar 1 time daily Disp: 1 kit Rfl: 0   Glucose Blood (MORENO CONTOUR NEXT TEST) In Vitro Strip Check blood sugar execise    Nutritional Goals  Limit carbohydrates to 100 gms per day, Eat 100-200 calories within 1 hour of waking  and Eat 3-4 cups of fresh fruits or vegetables daily    Behavior Modifications Reviewed and Discussed  Eat breakfast, Eat 3 meals per day, P apnea has been stable since the last clinic visit. There has not been any increase in hyper-somnolence.      Encounter Diagnosis(ses):   Binge eating  (primary encounter diagnosis)  Essential hypertension  Hypercholesterolemia  [place]ene (obstructive sleep apnea) Increase Vyvanse to 70 mg. Tolerating Saxenda- since she stopped med a few weeks ago, I advised Prudence Blankenship that she may need to re-start at a lower dose to avoid GI side effects.     Blood work done- Per Pt, PCP would like her to redraw Hgb A1c, lipid panel an

## 2018-01-16 ENCOUNTER — OFFICE VISIT (OUTPATIENT)
Dept: OPHTHALMOLOGY | Facility: CLINIC | Age: 63
End: 2018-01-16

## 2018-01-16 DIAGNOSIS — H25.13 AGE-RELATED NUCLEAR CATARACT OF BOTH EYES: ICD-10-CM

## 2018-01-16 DIAGNOSIS — E11.9 TYPE 2 DIABETES MELLITUS WITHOUT RETINOPATHY (HCC): Primary | ICD-10-CM

## 2018-01-16 PROCEDURE — 99243 OFF/OP CNSLTJ NEW/EST LOW 30: CPT | Performed by: OPHTHALMOLOGY

## 2018-01-16 PROCEDURE — 99212 OFFICE O/P EST SF 10 MIN: CPT | Performed by: OPHTHALMOLOGY

## 2018-01-16 PROCEDURE — 92015 DETERMINE REFRACTIVE STATE: CPT | Performed by: OPHTHALMOLOGY

## 2018-01-16 NOTE — PROGRESS NOTES
Erlinda Syed is a 63year old female.     HPI:     HPI     Consult    Additional comments: Consult per Dr. Tim Mcbride           Diabetic Eye Exam    Additional comments: Pt has been a diabetic for 1 years  6 months on pills/ 0 years on Insulin   Pt does not Medications:    Current Outpatient Prescriptions:  Lisdexamfetamine Dimesylate (VYVANSE) 70 MG Oral Cap Take 1 capsule (70 mg total) by mouth daily.  Disp: 30 capsule Rfl: 0   Albuterol Sulfate  (90 Base) MCG/ACT Inhalation Aero Soln I 500 MG Oral Tab Take 1 tablet (500 mg total) by mouth daily with dinner. Disp: 60 tablet Rfl: 2   Diclofenac Sodium (VOLTAREN) 1 % Transdermal Gel Apply 2 g topically 4 (four) times daily as needed.  Disp: 5 Tube Rfl: 1   selenium sulfide (SELSUN) 2.5 % Nasim +0.50 090    Type:  Reading only          Manifest Refraction (Auto)       Sphere Cylinder [facility] [facility] [place]    Right +2.50 +0.25 095       Left +1.75 +0.50 075             Manifest Refraction #2       Sphere Cyl[employer] [facility] [place]

## 2018-01-16 NOTE — ASSESSMENT & PLAN NOTE
Discussed early cataracts with patient. No treatment recommended at this time. Glasses RX for distance only or progressives; patient's choice. She will continue with her current reading only glasses.

## 2018-01-16 NOTE — PATIENT INSTRUCTIONS
Type 2 diabetes mellitus without retinopathy ([facility] [address]  Diabetes type II: no background of retinopathy, no signs of neovascularization noted. Discussed ocular and systemic benefits of blood sugar control.   Diagnosis and treatment discussed in detail with angy

## 2018-02-22 ENCOUNTER — HOSPITAL ENCOUNTER (EMERGENCY)
Facility: HOSPITAL | Age: 63
Discharge: HOME | End: 2018-02-22
Attending: EMERGENCY MEDICINE
Payer: COMMERCIAL

## 2018-02-22 VITALS
HEIGHT: 60 IN | OXYGEN SATURATION: 96 % | HEART RATE: 83 BPM | SYSTOLIC BLOOD PRESSURE: 134 MMHG | TEMPERATURE: 98 F | BODY MASS INDEX: 49.08 KG/M2 | WEIGHT: 250 LBS | RESPIRATION RATE: 18 BRPM | DIASTOLIC BLOOD PRESSURE: 61 MMHG

## 2018-02-22 DIAGNOSIS — R55 SYNCOPE, VASOVAGAL: Primary | ICD-10-CM

## 2018-02-22 DIAGNOSIS — K52.9 GASTROENTERITIS: ICD-10-CM

## 2018-02-22 LAB
ALBUMIN SERPL BCP-MCNC: 3.5 G/DL (ref 3.5–4.8)
ALP SERPL-CCNC: 55 U/L (ref 32–100)
ALT SERPL-CCNC: 19 U/L (ref 14–54)
ANION GAP SERPL CALC-SCNC: 8 MMOL/L (ref 0–18)
AST SERPL-CCNC: 22 U/L (ref 15–41)
BASOPHILS # BLD: 0.1 K/UL (ref 0–0.2)
BASOPHILS NFR BLD: 1 %
BILIRUB DIRECT SERPL-MCNC: 0.2 MG/DL (ref 0–0.2)
BILIRUB SERPL-MCNC: 1.2 MG/DL (ref 0.3–1.2)
BILIRUB UR QL: NEGATIVE
BUN SERPL-MCNC: 16 MG/DL (ref 8–20)
BUN/CREAT SERPL: 18 (ref 10–20)
CALCIUM SERPL-MCNC: 8 MG/DL (ref 8.5–10.5)
CHLORIDE SERPL-SCNC: 100 MMOL/L (ref 95–110)
CO2 SERPL-SCNC: 29 MMOL/L (ref 22–32)
COLOR UR: YELLOW
CREAT SERPL-MCNC: 0.89 MG/DL (ref 0.5–1.5)
EOSINOPHIL # BLD: 0.1 K/UL (ref 0–0.7)
EOSINOPHIL NFR BLD: 1 %
ERYTHROCYTE [DISTWIDTH] IN BLOOD BY AUTOMATED COUNT: 14.8 % (ref 11–15)
GLUCOSE SERPL-MCNC: 144 MG/DL (ref 70–99)
GLUCOSE UR-MCNC: NEGATIVE MG/DL
HCT VFR BLD AUTO: 40 % (ref 35–48)
HGB BLD-MCNC: 13.6 G/DL (ref 12–16)
HGB UR QL STRIP.AUTO: NEGATIVE
KETONES UR-MCNC: NEGATIVE MG/DL
LEUKOCYTE ESTERASE UR QL STRIP.AUTO: NEGATIVE
LIPASE SERPL-CCNC: 17 U/L (ref 22–51)
LYMPHOCYTES # BLD: 0.4 K/UL (ref 1–4)
LYMPHOCYTES NFR BLD: 4 %
MCH RBC QN AUTO: 30.3 PG (ref 27–32)
MCHC RBC AUTO-ENTMCNC: 34.1 G/DL (ref 32–37)
MCV RBC AUTO: 88.9 FL (ref 80–100)
MONOCYTES # BLD: 0.4 K/UL (ref 0–1)
MONOCYTES NFR BLD: 4 %
NEUTROPHILS # BLD AUTO: 9.5 K/UL (ref 1.8–7.7)
NEUTROPHILS NFR BLD: 91 %
NITRITE UR QL STRIP.AUTO: NEGATIVE
OSMOLALITY UR CALC.SUM OF ELEC: 288 MOSM/KG (ref 275–295)
PH UR: 6 [PH] (ref 5–8)
PLATELET # BLD AUTO: 206 K/UL (ref 140–400)
PMV BLD AUTO: 8 FL (ref 7.4–10.3)
POTASSIUM SERPL-SCNC: 3.6 MMOL/L (ref 3.3–5.1)
PROT SERPL-MCNC: 6.7 G/DL (ref 5.9–8.4)
PROT UR-MCNC: NEGATIVE MG/DL
RBC # BLD AUTO: 4.49 M/UL (ref 3.7–5.4)
SODIUM SERPL-SCNC: 137 MMOL/L (ref 136–144)
SP GR UR STRIP: 1.02 (ref 1–1.03)
UROBILINOGEN UR STRIP-ACNC: <2
VIT C UR-MCNC: NEGATIVE MG/DL
WBC # BLD AUTO: 10.4 K/UL (ref 4–11)

## 2018-02-22 PROCEDURE — 80076 HEPATIC FUNCTION PANEL: CPT | Performed by: EMERGENCY MEDICINE

## 2018-02-22 PROCEDURE — 80048 BASIC METABOLIC PNL TOTAL CA: CPT | Performed by: EMERGENCY MEDICINE

## 2018-02-22 PROCEDURE — 99284 EMERGENCY DEPT VISIT MOD MDM: CPT

## 2018-02-22 PROCEDURE — 81003 URINALYSIS AUTO W/O SCOPE: CPT | Performed by: EMERGENCY MEDICINE

## 2018-02-22 PROCEDURE — 83690 ASSAY OF LIPASE: CPT | Performed by: EMERGENCY MEDICINE

## 2018-02-22 PROCEDURE — 93010 ELECTROCARDIOGRAM REPORT: CPT | Performed by: EMERGENCY MEDICINE

## 2018-02-22 PROCEDURE — 96374 THER/PROPH/DIAG INJ IV PUSH: CPT

## 2018-02-22 PROCEDURE — 85025 COMPLETE CBC W/AUTO DIFF WBC: CPT | Performed by: EMERGENCY MEDICINE

## 2018-02-22 PROCEDURE — 96361 HYDRATE IV INFUSION ADD-ON: CPT

## 2018-02-22 PROCEDURE — 93005 ELECTROCARDIOGRAM TRACING: CPT

## 2018-02-22 RX ORDER — ONDANSETRON 2 MG/ML
4 INJECTION INTRAMUSCULAR; INTRAVENOUS ONCE
Status: COMPLETED | OUTPATIENT
Start: 2018-02-22 | End: 2018-02-22

## 2018-02-22 RX ORDER — ONDANSETRON 4 MG/1
4 TABLET, FILM COATED ORAL EVERY 8 HOURS PRN
Qty: 20 TABLET | Refills: 0 | Status: ON HOLD | OUTPATIENT
Start: 2018-02-22 | End: 2018-07-12 | Stop reason: ALTCHOICE

## 2018-02-22 NOTE — ED NOTES
Pt states she developed nausea, vomiting and diarrhea lats night and she went to work this morning felling better, but all of a sudden she felt nauseated and vomited and had an syncopal episode. Pt is unsure if she had a fever last night.  Pt states she fee

## 2018-02-22 NOTE — ED PROVIDER NOTES
Patient Seen in: [facility] AND [facility] Emergency Department     History   Patient presents with:  Nausea/Vomiting/Diarrhea (gastrointestinal)  Syncope (cardiovascular, neurologic)    Stated Complaint: n/v/d syncope    HPI    63year old female complains of ana until pack is completed. See if not better. Albuterol Sulfate  (90 Base) MCG/ACT Inhalation Aero Soln,  Inhale 2 puffs into the lungs 4 (four) times daily as needed for Wheezing.    azithromycin (ZITHROMAX Z-MYA) 250 MG Oral Tab,  As directed   pro Relation Age of Onset   • Hypertension Father    • Heart Disease Father    • Heart Disorder Mother    • Heart Disease Mother    • Diabetes Neg    • Glaucoma Neg    • Retinal detachment Neg    • Macular degeneration Neg    • Cancer Neg        Smoking status breath sounds normal. No accessory muscle usage or stridor. No apnea and no tachypnea. No respiratory distress. She exhibits no retraction. Abdominal: Soft. There is no tenderness. There is no rebound and no guarding.    Musculoskeletal: Normal range of m EKG    Rate, intervals and axes as noted on EKG Report.   Rate: 86  Rhythm: Atrial rhythm  Reading: T-wave flattening           Imaging Results Available and Reviewed while in ED: No orders to display    ED Medications Administered:   Medications   onda evaluation in patients referred urgently to a [facility] hospital: the EGSYS score. Heart. 2008;94(12):1620-6. VALIDATION:  Myranda Frankel, Rudy S, Doris PENA, Darlene MORENO. Validation of EGSYS Score in Prediction of Cardiogenic Syncope.  Emerg Med Int ED treatment. overall clinical presentation including general toxicity and distal perfusion are improved. hemodynamic function is no significant change.      Diagnostic Considerations and Interpretation of abnormal or significant results:  ED Course as o leaving the department: Stable

## 2018-02-22 NOTE — ED INITIAL ASSESSMENT (HPI)
Pt had multiple episodes of v/d since last night. Went to work this morning because she was feeling better. While at work became nauseated again, co-workers called EMS and initially patient refused. Pt then developed nausea, vomited and passed out.   Pt

## 2018-02-26 ENCOUNTER — OFFICE VISIT (OUTPATIENT)
Dept: NEPHROLOGY | Facility: CLINIC | Age: 63
End: 2018-02-26

## 2018-02-26 VITALS
TEMPERATURE: 98 F | WEIGHT: 257 LBS | BODY MASS INDEX: 51.81 KG/M2 | DIASTOLIC BLOOD PRESSURE: 87 MMHG | SYSTOLIC BLOOD PRESSURE: 135 MMHG | HEIGHT: 59 IN | HEART RATE: 85 BPM

## 2018-02-26 DIAGNOSIS — J01.90 ACUTE SINUSITIS, RECURRENCE NOT SPECIFIED, UNSPECIFIED LOCATION: ICD-10-CM

## 2018-02-26 DIAGNOSIS — K52.9 GASTROENTERITIS: Primary | ICD-10-CM

## 2018-02-26 PROCEDURE — 99212 OFFICE O/P EST SF 10 MIN: CPT | Performed by: INTERNAL MEDICINE

## 2018-02-26 PROCEDURE — 99213 OFFICE O/P EST LOW 20 MIN: CPT | Performed by: INTERNAL MEDICINE

## 2018-02-26 RX ORDER — AMOXICILLIN AND CLAVULANATE POTASSIUM 875; 125 MG/1; MG/1
1 TABLET, FILM COATED ORAL 2 TIMES DAILY
Qty: 20 TABLET | Refills: 0 | Status: ON HOLD | OUTPATIENT
Start: 2018-02-26 | End: 2018-07-12 | Stop reason: ALTCHOICE

## 2018-02-26 RX ORDER — LISDEXAMFETAMINE DIMESYLATE 70 MG/1
70 CAPSULE ORAL DAILY
Refills: 0 | COMMUNITY
Start: 2017-12-21 | End: 2018-04-09 | Stop reason: ALTCHOICE

## 2018-02-27 NOTE — PROGRESS NOTES
[facility], [facility]  Nephrology Daily Progress Note    Autumn Saldaña  [id number]  63year old  Patient presents with:  ER F/U      HPI:   Autumn Saldaña is a 63year old female.   79-year-old female with a history of hypertension, hypercholesterolemia, adult o Vitals History 2/22/2018 2/22/2018 2/26/2018   /0 134/61 135/87   BP Location - - -   Patient Position - - -   Cuff Size - - -   Pulse 87 83 85   Resp 22 18 -   Temp 98.1 - 97.7   SpO2 97 96 -   Weight 250 lbs - 257 lbs   Height 60 - 59.000   BOD tablet, Rfl: 0  •  LOSARTAN POTASSIUM-HCTZ 100-25 MG Oral Tab, TAKE 1 TABLET BY MOUTH DAILY, Disp: 90 tablet, Rfl: 1  •  METOPROLOL SUCCINATE ER 25 MG Oral Tablet 24 Hr, TAKE 1 TABLET BY MOUTH DAILY, Disp: 90 tablet, Rfl: 1  •  Blood Glucose Monitoring Sup Calcium 20 MG Oral Tab, TAKE 1 TABLET(20 MG) BY MOUTH DAILY, Disp: 90 tablet, Rfl: 1  •  MetFORMIN HCl 500 MG Oral Tab, Take 1 tablet (500 mg total) by mouth daily with dinner., Disp: 60 tablet, Rfl: 2  •  selenium sulfide (SELSUN) 2.5 % Apply Externally L

## 2018-02-27 NOTE — PATIENT INSTRUCTIONS
Let me know if your symptoms do not resolve. You can start the Augmentin once you are sure your GI symptoms are better.

## 2018-04-09 ENCOUNTER — OFFICE VISIT (OUTPATIENT)
Dept: SURGERY | Facility: CLINIC | Age: 63
End: 2018-04-09

## 2018-04-09 VITALS
SYSTOLIC BLOOD PRESSURE: 152 MMHG | BODY MASS INDEX: 51.81 KG/M2 | HEART RATE: 75 BPM | OXYGEN SATURATION: 96 % | DIASTOLIC BLOOD PRESSURE: 89 MMHG | HEIGHT: 59 IN | RESPIRATION RATE: 16 BRPM | WEIGHT: 257 LBS

## 2018-04-09 DIAGNOSIS — R63.2 INCREASED APPETITE: ICD-10-CM

## 2018-04-09 DIAGNOSIS — G47.33 OSA (OBSTRUCTIVE SLEEP APNEA): ICD-10-CM

## 2018-04-09 DIAGNOSIS — R63.2 BINGE EATING: ICD-10-CM

## 2018-04-09 DIAGNOSIS — Z51.81 ENCOUNTER FOR THERAPEUTIC DRUG MONITORING: Primary | ICD-10-CM

## 2018-04-09 DIAGNOSIS — I10 ESSENTIAL HYPERTENSION: ICD-10-CM

## 2018-04-09 DIAGNOSIS — E66.01 MORBID OBESITY WITH BMI OF 50.0-59.9, ADULT (HCC): ICD-10-CM

## 2018-04-09 DIAGNOSIS — E78.00 HYPERCHOLESTEROLEMIA: ICD-10-CM

## 2018-04-09 DIAGNOSIS — E11.9 TYPE 2 DIABETES MELLITUS WITHOUT RETINOPATHY (HCC): ICD-10-CM

## 2018-04-09 PROCEDURE — 99214 OFFICE O/P EST MOD 30 MIN: CPT | Performed by: NURSE PRACTITIONER

## 2018-04-09 NOTE — PROGRESS NOTES
[address] BARIATRIC AND WEIGHT LOSS CLINIC  [address] [place] [address]  Dept: [phone]     Date:   18    Patient:  Kendall Del Angel  :      10/24/1955  MRN:      [id number]    Chief Complaint:  Patient presents with:   Robyn Louise by mouth every 8 (eight) hours as needed for Nausea. Disp: 20 tablet Rfl: 0   Albuterol Sulfate  (90 Base) MCG/ACT Inhalation Aero Soln Inhale 2 puffs into the lungs 4 (four) times daily as needed for Wheezing.  Disp: 1 Inhaler Rfl: 1   promethazine- Sodium (VOLTAREN) 1 % Transdermal Gel Apply 2 g topically 4 (four) times daily as needed. Disp: 5 Tube Rfl: 1   selenium sulfide (SELSUN) 2.5 % Apply Externally Lotion Apply topically 2 times every week to affected area(s).  Disp: 118 mL Rfl: 10     Allergi per day, Eat 100-200 calories within 1 hour of waking  and Eat 3-4 cups of fresh fruits or vegetables daily    Behavior Modifications Reviewed and Discussed  Eat breakfast, Eat 3 meals per day, Plan meals in advance, Read nutrition labels, Drink 64 oz of w not been any increase in hyper-somnolence.      Encounter Diagnosis(ses):   Encounter for therapeutic drug monitoring  (primary encounter diagnosis)  Binge eating  Essential hypertension  Hypercholesterolemia  Eugene (obstructive sleep apnea)  Type 2 diabetes

## 2018-04-26 ENCOUNTER — OFFICE VISIT (OUTPATIENT)
Dept: INTERNAL MEDICINE CLINIC | Facility: CLINIC | Age: 63
End: 2018-04-26

## 2018-04-26 ENCOUNTER — LAB ENCOUNTER (OUTPATIENT)
Dept: LAB | Age: 63
End: 2018-04-26
Attending: INTERNAL MEDICINE
Payer: COMMERCIAL

## 2018-04-26 VITALS
TEMPERATURE: 99 F | SYSTOLIC BLOOD PRESSURE: 148 MMHG | HEART RATE: 93 BPM | HEIGHT: 58.5 IN | BODY MASS INDEX: 53.33 KG/M2 | WEIGHT: 261 LBS | DIASTOLIC BLOOD PRESSURE: 97 MMHG

## 2018-04-26 DIAGNOSIS — R53.83 OTHER FATIGUE: ICD-10-CM

## 2018-04-26 DIAGNOSIS — J32.0 CHRONIC MAXILLARY SINUSITIS: Primary | ICD-10-CM

## 2018-04-26 PROCEDURE — 99213 OFFICE O/P EST LOW 20 MIN: CPT | Performed by: INTERNAL MEDICINE

## 2018-04-26 PROCEDURE — 36415 COLL VENOUS BLD VENIPUNCTURE: CPT

## 2018-04-26 PROCEDURE — 84439 ASSAY OF FREE THYROXINE: CPT

## 2018-04-26 PROCEDURE — 99212 OFFICE O/P EST SF 10 MIN: CPT | Performed by: INTERNAL MEDICINE

## 2018-04-26 PROCEDURE — 84443 ASSAY THYROID STIM HORMONE: CPT

## 2018-04-26 PROCEDURE — 80050 GENERAL HEALTH PANEL: CPT

## 2018-04-26 PROCEDURE — 80053 COMPREHEN METABOLIC PANEL: CPT

## 2018-04-26 RX ORDER — AMOXICILLIN AND CLAVULANATE POTASSIUM 875; 125 MG/1; MG/1
1 TABLET, FILM COATED ORAL 2 TIMES DAILY
Qty: 42 TABLET | Refills: 0 | Status: SHIPPED | OUTPATIENT
Start: 2018-04-26 | End: 2018-05-17
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
## 2018-05-09 ENCOUNTER — TELEPHONE (OUTPATIENT)
Dept: NEPHROLOGY | Facility: CLINIC | Age: 63
End: 2018-05-09

## 2018-05-09 DIAGNOSIS — E03.9 HYPOTHYROIDISM, UNSPECIFIED TYPE: Primary | ICD-10-CM

## 2018-05-09 RX ORDER — LEVOTHYROXINE SODIUM 175 UG/1
175 TABLET ORAL
Qty: 30 TABLET | Refills: 5 | Status: SHIPPED | OUTPATIENT
Start: 2018-05-09 | End: 2019-01-12

## 2018-05-09 NOTE — TELEPHONE ENCOUNTER
Let her know that one another doctor ordered labs I do not get copies of these reports. Her labs were good except her thyroid was mildly underactive. If on 150 mcg of Synthroid increased to 175 mcg, 1 daily, #30, refills 5.   TSH in 6 weeks

## 2018-05-09 NOTE — TELEPHONE ENCOUNTER
Pt called. Notified her that her labs were good except thyroid mildly underactive. She confirms that she has been taking levothyroxine 150 mcg daily so she will increase to 175 mcg daily and will repeat TSH in 6 weeks. Orders entered.  Also explained that i

## 2018-05-09 NOTE — TELEPHONE ENCOUNTER
Pt was wondering if MKK had a chance to review labs that were ordered by Dr Jm Carrero. Pls call. Thank you.

## 2018-05-09 NOTE — TELEPHONE ENCOUNTER
Attempted to contact pt. Work phone just rings after the [facility] [employer] recording ends. Attempted to contact cell and call was disconnected. Will try again later.

## 2018-05-29 ENCOUNTER — OFFICE VISIT (OUTPATIENT)
Dept: SURGERY | Facility: CLINIC | Age: 63
End: 2018-05-29

## 2018-05-29 VITALS
HEART RATE: 92 BPM | HEIGHT: 59 IN | BODY MASS INDEX: 51.89 KG/M2 | WEIGHT: 257.38 LBS | DIASTOLIC BLOOD PRESSURE: 93 MMHG | RESPIRATION RATE: 16 BRPM | SYSTOLIC BLOOD PRESSURE: 148 MMHG

## 2018-05-29 DIAGNOSIS — Z51.81 ENCOUNTER FOR THERAPEUTIC DRUG MONITORING: ICD-10-CM

## 2018-05-29 DIAGNOSIS — E11.9 TYPE 2 DIABETES MELLITUS WITHOUT RETINOPATHY (HCC): ICD-10-CM

## 2018-05-29 DIAGNOSIS — G47.33 OSA (OBSTRUCTIVE SLEEP APNEA): ICD-10-CM

## 2018-05-29 DIAGNOSIS — E66.01 MORBID OBESITY WITH BMI OF 50.0-59.9, ADULT (HCC): ICD-10-CM

## 2018-05-29 DIAGNOSIS — I10 ESSENTIAL HYPERTENSION: ICD-10-CM

## 2018-05-29 DIAGNOSIS — R63.2 BINGE EATING: ICD-10-CM

## 2018-05-29 DIAGNOSIS — E78.00 PURE HYPERCHOLESTEROLEMIA: Primary | ICD-10-CM

## 2018-05-29 PROCEDURE — 99214 OFFICE O/P EST MOD 30 MIN: CPT | Performed by: INTERNAL MEDICINE

## 2018-05-29 NOTE — PROGRESS NOTES
300 [facility]  300 [facility] BARIATRIC AND WEIGHT LOSS CLINIC  [address] [place] [address]  Dept: [phone]     Date:   18    Patient:  Erlinda Syed  :      10/24/1955  MRN:      [id number]    Chief Complaint:  Patient presents with:   Doni Montelongo apply Misc Use a new needle with each use Disp: 1 Box Rfl: 2   Lisdexamfetamine Dimesylate (VYVANSE) 70 MG Oral Cap Take 1 capsule (70 mg total) by mouth daily.  Disp: 30 capsule Rfl: 0   Amoxicillin-Pot Clavulanate 875-125 MG Oral Tab Take 1 tablet by mout (VOLTAREN) 1 % Transdermal Gel Apply 2 g topically 4 (four) times daily as needed. Disp: 5 Tube Rfl: 1   selenium sulfide (SELSUN) 2.5 % Apply Externally Lotion Apply topically 2 times every week to affected area(s).  Disp: 118 mL Rfl: 10     Allergies:  Pa Eat 100-200 calories within 1 hour of waking  and Eat 3-4 cups of fresh fruits or vegetables daily    Behavior Modifications Reviewed and Discussed  Eat breakfast, Eat 3 meals per day, Plan meals in advance, Read nutrition labels, Drink 64 oz of water per any increase in hyper-somnolence.      Encounter Diagnosis(ses):   Pure hypercholesterolemia  (primary encounter diagnosis)  Essential hypertension  Eugene (obstructive sleep apnea)  Type 2 diabetes mellitus without retinopathy (hcc)  Binge eating  Encounter f

## 2018-06-25 ENCOUNTER — TELEPHONE (OUTPATIENT)
Dept: NEPHROLOGY | Facility: CLINIC | Age: 63
End: 2018-06-25

## 2018-06-25 DIAGNOSIS — L21.0 DANDRUFF IN ADULT: Primary | ICD-10-CM

## 2018-06-25 NOTE — TELEPHONE ENCOUNTER
Pt called to request referral to Dr. Kwame Killian for problem with dandruff. Appt not scheduled yet. Please call.

## 2018-07-11 ENCOUNTER — TELEPHONE (OUTPATIENT)
Dept: NEPHROLOGY | Facility: CLINIC | Age: 63
End: 2018-07-11

## 2018-07-11 NOTE — TELEPHONE ENCOUNTER
Pt was offered appt 7/27, pt indicates she has not been feeling well and would like to discuss with rn, pt has a pinching on her left side chest, near breast (daily), pls call at:[phone],thanks.   *Pt has goggled symptoms and indicates it pertains to t

## 2018-07-12 ENCOUNTER — HOSPITAL ENCOUNTER (OUTPATIENT)
Facility: HOSPITAL | Age: 63
Setting detail: OBSERVATION
Discharge: HOME | End: 2018-07-14
Attending: EMERGENCY MEDICINE | Admitting: HOSPITALIST
Payer: COMMERCIAL

## 2018-07-12 ENCOUNTER — APPOINTMENT (OUTPATIENT)
Dept: GENERAL RADIOLOGY | Facility: HOSPITAL | Age: 63
End: 2018-07-12
Attending: EMERGENCY MEDICINE
Payer: COMMERCIAL

## 2018-07-12 DIAGNOSIS — R07.9 ACUTE CHEST PAIN: Primary | ICD-10-CM

## 2018-07-12 PROBLEM — R73.9 HYPERGLYCEMIA: Status: ACTIVE | Noted: 2018-07-12

## 2018-07-12 PROBLEM — R79.89 AZOTEMIA: Status: ACTIVE | Noted: 2018-07-12

## 2018-07-12 LAB
ANION GAP SERPL CALC-SCNC: 10 MMOL/L (ref 0–18)
BASOPHILS # BLD: 0.1 K/UL (ref 0–0.2)
BASOPHILS NFR BLD: 1 %
BILIRUB UR QL: NEGATIVE
BNP SERPL-MCNC: 24 PG/ML (ref 0–100)
BUN SERPL-MCNC: 19 MG/DL (ref 8–20)
BUN/CREAT SERPL: 24.7 (ref 10–20)
CALCIUM SERPL-MCNC: 8.6 MG/DL (ref 8.5–10.5)
CHLORIDE SERPL-SCNC: 102 MMOL/L (ref 95–110)
CLARITY UR: CLEAR
CO2 SERPL-SCNC: 24 MMOL/L (ref 22–32)
COLOR UR: YELLOW
CREAT SERPL-MCNC: 0.77 MG/DL (ref 0.5–1.5)
D DIMER PPP FEU-MCNC: <0.27 MCG/ML (ref ?–0.6)
EOSINOPHIL # BLD: 0.1 K/UL (ref 0–0.7)
EOSINOPHIL NFR BLD: 1 %
ERYTHROCYTE [DISTWIDTH] IN BLOOD BY AUTOMATED COUNT: 14.1 % (ref 11–15)
GLUCOSE BLDC GLUCOMTR-MCNC: 106 MG/DL (ref 70–99)
GLUCOSE BLDC GLUCOMTR-MCNC: 111 MG/DL (ref 70–99)
GLUCOSE BLDC GLUCOMTR-MCNC: 130 MG/DL (ref 70–99)
GLUCOSE SERPL-MCNC: 216 MG/DL (ref 70–99)
GLUCOSE UR-MCNC: NEGATIVE MG/DL
HCT VFR BLD AUTO: 41.1 % (ref 35–48)
HGB BLD-MCNC: 13.8 G/DL (ref 12–16)
HGB UR QL STRIP.AUTO: NEGATIVE
KETONES UR-MCNC: NEGATIVE MG/DL
LEUKOCYTE ESTERASE UR QL STRIP.AUTO: NEGATIVE
LYMPHOCYTES # BLD: 1.5 K/UL (ref 1–4)
LYMPHOCYTES NFR BLD: 15 %
MCH RBC QN AUTO: 30.1 PG (ref 27–32)
MCHC RBC AUTO-ENTMCNC: 33.6 G/DL (ref 32–37)
MCV RBC AUTO: 89.6 FL (ref 80–100)
MONOCYTES # BLD: 0.5 K/UL (ref 0–1)
MONOCYTES NFR BLD: 5 %
NEUTROPHILS # BLD AUTO: 8 K/UL (ref 1.8–7.7)
NEUTROPHILS NFR BLD: 78 %
NITRITE UR QL STRIP.AUTO: NEGATIVE
OSMOLALITY UR CALC.SUM OF ELEC: 291 MOSM/KG (ref 275–295)
PH UR: 5 [PH] (ref 5–8)
PLATELET # BLD AUTO: 250 K/UL (ref 140–400)
PMV BLD AUTO: 8.2 FL (ref 7.4–10.3)
POTASSIUM SERPL-SCNC: 3.6 MMOL/L (ref 3.3–5.1)
PROT UR-MCNC: NEGATIVE MG/DL
RBC # BLD AUTO: 4.59 M/UL (ref 3.7–5.4)
SODIUM SERPL-SCNC: 136 MMOL/L (ref 136–144)
SP GR UR STRIP: 1.02 (ref 1–1.03)
TROPONIN I SERPL-MCNC: 0 NG/ML (ref ?–0.03)
UROBILINOGEN UR STRIP-ACNC: <2
VIT C UR-MCNC: NEGATIVE MG/DL
WBC # BLD AUTO: 10.2 K/UL (ref 4–11)

## 2018-07-12 PROCEDURE — 99220 INITIAL OBSERVATION CARE,LEVL III: CPT | Performed by: HOSPITALIST

## 2018-07-12 PROCEDURE — 71045 X-RAY EXAM CHEST 1 VIEW: CPT | Performed by: EMERGENCY MEDICINE

## 2018-07-12 RX ORDER — METOPROLOL SUCCINATE 25 MG/1
25 TABLET, EXTENDED RELEASE ORAL DAILY
Status: DISCONTINUED | OUTPATIENT
Start: 2018-07-13 | End: 2018-07-14

## 2018-07-12 RX ORDER — POTASSIUM CHLORIDE 20 MEQ/1
40 TABLET, EXTENDED RELEASE ORAL EVERY 4 HOURS
Status: COMPLETED | OUTPATIENT
Start: 2018-07-12 | End: 2018-07-12

## 2018-07-12 RX ORDER — SODIUM PHOSPHATE, DIBASIC AND SODIUM PHOSPHATE, MONOBASIC 7; 19 G/133ML; G/133ML
1 ENEMA RECTAL ONCE AS NEEDED
Status: DISCONTINUED | OUTPATIENT
Start: 2018-07-12 | End: 2018-07-14

## 2018-07-12 RX ORDER — POLYETHYLENE GLYCOL 3350 17 G/17G
17 POWDER, FOR SOLUTION ORAL DAILY PRN
Status: DISCONTINUED | OUTPATIENT
Start: 2018-07-12 | End: 2018-07-14

## 2018-07-12 RX ORDER — ASPIRIN 81 MG/1
324 TABLET, CHEWABLE ORAL ONCE
Status: COMPLETED | OUTPATIENT
Start: 2018-07-12 | End: 2018-07-12

## 2018-07-12 RX ORDER — LOSARTAN POTASSIUM AND HYDROCHLOROTHIAZIDE 25; 100 MG/1; MG/1
1 TABLET ORAL
Status: DISCONTINUED | OUTPATIENT
Start: 2018-07-12 | End: 2018-07-12

## 2018-07-12 RX ORDER — ASPIRIN 325 MG
325 TABLET ORAL DAILY
Status: DISCONTINUED | OUTPATIENT
Start: 2018-07-12 | End: 2018-07-14

## 2018-07-12 RX ORDER — NITROGLYCERIN 0.4 MG/1
0.4 TABLET SUBLINGUAL EVERY 5 MIN PRN
Status: DISCONTINUED | OUTPATIENT
Start: 2018-07-12 | End: 2018-07-14

## 2018-07-12 RX ORDER — DOCUSATE SODIUM 100 MG/1
100 CAPSULE, LIQUID FILLED ORAL 2 TIMES DAILY
Status: DISCONTINUED | OUTPATIENT
Start: 2018-07-12 | End: 2018-07-14

## 2018-07-12 RX ORDER — LEVOTHYROXINE SODIUM 175 UG/1
175 TABLET ORAL
Status: DISCONTINUED | OUTPATIENT
Start: 2018-07-13 | End: 2018-07-14

## 2018-07-12 RX ORDER — ACETAMINOPHEN 325 MG/1
650 TABLET ORAL EVERY 6 HOURS PRN
Status: DISCONTINUED | OUTPATIENT
Start: 2018-07-12 | End: 2018-07-14

## 2018-07-12 RX ORDER — BISACODYL 10 MG
10 SUPPOSITORY, RECTAL RECTAL
Status: DISCONTINUED | OUTPATIENT
Start: 2018-07-12 | End: 2018-07-14

## 2018-07-12 RX ORDER — DEXTROSE MONOHYDRATE 25 G/50ML
50 INJECTION, SOLUTION INTRAVENOUS AS NEEDED
Status: DISCONTINUED | OUTPATIENT
Start: 2018-07-12 | End: 2018-07-14

## 2018-07-12 RX ORDER — LEVOTHYROXINE SODIUM 175 UG/1
175 TABLET ORAL
Status: DISCONTINUED | OUTPATIENT
Start: 2018-07-13 | End: 2018-07-12

## 2018-07-12 RX ORDER — HEPARIN SODIUM 5000 [USP'U]/ML
5000 INJECTION, SOLUTION INTRAVENOUS; SUBCUTANEOUS EVERY 12 HOURS SCHEDULED
Status: DISCONTINUED | OUTPATIENT
Start: 2018-07-12 | End: 2018-07-14

## 2018-07-12 RX ORDER — SODIUM CHLORIDE 0.9 % (FLUSH) 0.9 %
3 SYRINGE (ML) INJECTION AS NEEDED
Status: DISCONTINUED | OUTPATIENT
Start: 2018-07-12 | End: 2018-07-14

## 2018-07-12 RX ORDER — ONDANSETRON 2 MG/ML
4 INJECTION INTRAMUSCULAR; INTRAVENOUS EVERY 6 HOURS PRN
Status: DISCONTINUED | OUTPATIENT
Start: 2018-07-12 | End: 2018-07-14

## 2018-07-12 RX ORDER — ALBUTEROL SULFATE 90 UG/1
2 AEROSOL, METERED RESPIRATORY (INHALATION) 4 TIMES DAILY PRN
Status: DISCONTINUED | OUTPATIENT
Start: 2018-07-12 | End: 2018-07-14

## 2018-07-12 RX ORDER — ROSUVASTATIN CALCIUM 20 MG/1
20 TABLET, COATED ORAL NIGHTLY
Status: DISCONTINUED | OUTPATIENT
Start: 2018-07-12 | End: 2018-07-14

## 2018-07-12 NOTE — ED INITIAL ASSESSMENT (HPI)
Pt states \"left-sided chest pinching\" x 1 week- states SOB with hx COPD and states, \"I can't pinpoint if this is it. \" Pt also states \"the blood flow to my left lower leg stopped flowing correctly and I am worried about a blood clot. \" + pedal pulses.

## 2018-07-12 NOTE — ED NOTES
Presents to ER for c/o several brief episodes of non-radiating L sided chest pain over the past 5 days. Also c/o feeling fatigued. Denies recent illness. Hx of COPD, denies worsening shortness of breath. No active chest discomfort at this time.  Will contin

## 2018-07-12 NOTE — RESPIRATORY THERAPY NOTE
ROGELIO ASSESSMENT:    Pt does not have a previous diagnosis of ROGELIO. Pt does not routinely use a CPAP device at home. patient refused CPAP

## 2018-07-12 NOTE — ED PROVIDER NOTES
Patient Seen in: [facility] AND [facility] Emergency Department    History   Patient presents with:  Chest Pain Angina (cardiovascular)    Stated Complaint: Chest Pain x1week    HPI    Patient presents emergency department complaining of chest pain.   She describ noted in HPI. Constitutional and vital signs reviewed. All other systems reviewed and negative except as noted above.     Physical Exam   ED Triage Vitals [07/12/18 1045]  BP: 156/79  Pulse: 96  Resp: 20  Temp: 98.2 °F (36.8 °C)  Temp src: Oral  SpO2: -----------         ------                     CBC W/ DIFFERENTIAL[[id number]]          Abnormal            Final result                 Please view results for these tests on the individual orders.    URINALYSIS WITH CULTURE REFLEX   TROPONIN I

## 2018-07-12 NOTE — TELEPHONE ENCOUNTER
Contacted pt. She has had intermittent \"pinching\" and \"sharp needle\"-like pain near left breast for a couple weeks. Over the past week, these episodes have become more frequent and are occurring daily.  It happened 2 times 2 days ago and 3 times yesterd

## 2018-07-12 NOTE — H&P
[facility]    PATIENT'S NAME: Oksana Treadwell PHYSICIAN: Noemi Stewart MD   PATIENT ACCOUNT#:   [id number]    LOCATION:  [place] [address]  MEDICAL RECORD #:   [id number]       YOB: 1955  ADMISSION DATE:       07/12/20 mucous membranes. Normal hard and soft palate. NECK:  Trachea midline. Full range of motion. Supple. No thyromegaly or lymphadenopathy. LUNGS:  Clear to auscultation bilaterally. Normal respiratory effort. No intercostal retractions.    HEART:  Re

## 2018-07-13 ENCOUNTER — APPOINTMENT (OUTPATIENT)
Dept: CV DIAGNOSTICS | Facility: HOSPITAL | Age: 63
End: 2018-07-13
Attending: HOSPITALIST
Payer: COMMERCIAL

## 2018-07-13 ENCOUNTER — APPOINTMENT (OUTPATIENT)
Dept: NUCLEAR MEDICINE | Facility: HOSPITAL | Age: 63
End: 2018-07-13
Attending: HOSPITALIST
Payer: COMMERCIAL

## 2018-07-13 LAB
ANION GAP SERPL CALC-SCNC: 7 MMOL/L (ref 0–18)
BASOPHILS # BLD: 0 K/UL (ref 0–0.2)
BASOPHILS NFR BLD: 1 %
BUN SERPL-MCNC: 17 MG/DL (ref 8–20)
BUN/CREAT SERPL: 24.3 (ref 10–20)
CALCIUM SERPL-MCNC: 8.5 MG/DL (ref 8.5–10.5)
CHLORIDE SERPL-SCNC: 103 MMOL/L (ref 95–110)
CHOLEST SERPL-MCNC: 164 MG/DL (ref 110–200)
CO2 SERPL-SCNC: 28 MMOL/L (ref 22–32)
CREAT SERPL-MCNC: 0.7 MG/DL (ref 0.5–1.5)
EOSINOPHIL # BLD: 0.2 K/UL (ref 0–0.7)
EOSINOPHIL NFR BLD: 3 %
ERYTHROCYTE [DISTWIDTH] IN BLOOD BY AUTOMATED COUNT: 14.4 % (ref 11–15)
GLUCOSE BLDC GLUCOMTR-MCNC: 120 MG/DL (ref 70–99)
GLUCOSE BLDC GLUCOMTR-MCNC: 126 MG/DL (ref 70–99)
GLUCOSE BLDC GLUCOMTR-MCNC: 94 MG/DL (ref 70–99)
GLUCOSE SERPL-MCNC: 140 MG/DL (ref 70–99)
HBA1C MFR BLD: 7 % (ref 4–6)
HCT VFR BLD AUTO: 39 % (ref 35–48)
HDLC SERPL-MCNC: 34 MG/DL
HGB BLD-MCNC: 13.2 G/DL (ref 12–16)
INR BLD: 1 (ref 0.9–1.2)
LDLC SERPL CALC-MCNC: 83 MG/DL (ref 0–99)
LYMPHOCYTES # BLD: 2.1 K/UL (ref 1–4)
LYMPHOCYTES NFR BLD: 26 %
MCH RBC QN AUTO: 30 PG (ref 27–32)
MCHC RBC AUTO-ENTMCNC: 33.9 G/DL (ref 32–37)
MCV RBC AUTO: 88.5 FL (ref 80–100)
MONOCYTES # BLD: 0.5 K/UL (ref 0–1)
MONOCYTES NFR BLD: 6 %
NEUTROPHILS # BLD AUTO: 5.2 K/UL (ref 1.8–7.7)
NEUTROPHILS NFR BLD: 65 %
NONHDLC SERPL-MCNC: 130 MG/DL
OSMOLALITY UR CALC.SUM OF ELEC: 290 MOSM/KG (ref 275–295)
PLATELET # BLD AUTO: 258 K/UL (ref 140–400)
PMV BLD AUTO: 8.5 FL (ref 7.4–10.3)
POTASSIUM SERPL-SCNC: 4 MMOL/L (ref 3.3–5.1)
POTASSIUM SERPL-SCNC: 4 MMOL/L (ref 3.3–5.1)
PROTHROMBIN TIME: 13 SECONDS (ref 11.8–14.5)
RBC # BLD AUTO: 4.41 M/UL (ref 3.7–5.4)
SODIUM SERPL-SCNC: 138 MMOL/L (ref 136–144)
TRIGL SERPL-MCNC: 233 MG/DL (ref 1–149)
WBC # BLD AUTO: 8 K/UL (ref 4–11)

## 2018-07-13 PROCEDURE — 99226 SUBSEQUENT OBSERVATION CARE: CPT | Performed by: HOSPITALIST

## 2018-07-13 PROCEDURE — 93017 CV STRESS TEST TRACING ONLY: CPT | Performed by: HOSPITALIST

## 2018-07-13 PROCEDURE — 78452 HT MUSCLE IMAGE SPECT MULT: CPT | Performed by: HOSPITALIST

## 2018-07-13 RX ORDER — ASPIRIN 81 MG/1
324 TABLET, CHEWABLE ORAL ONCE
Status: DISCONTINUED | OUTPATIENT
Start: 2018-07-13 | End: 2018-07-14

## 2018-07-13 RX ORDER — CHLORHEXIDINE GLUCONATE 4 G/100ML
30 SOLUTION TOPICAL
Status: COMPLETED | OUTPATIENT
Start: 2018-07-14 | End: 2018-07-14

## 2018-07-13 RX ORDER — 0.9 % SODIUM CHLORIDE 0.9 %
VIAL (ML) INJECTION
Status: COMPLETED
Start: 2018-07-13 | End: 2018-07-13

## 2018-07-13 RX ORDER — SODIUM CHLORIDE 9 MG/ML
INJECTION, SOLUTION INTRAVENOUS CONTINUOUS
Status: DISCONTINUED | OUTPATIENT
Start: 2018-07-13 | End: 2018-07-14

## 2018-07-13 NOTE — PROGRESS NOTES
[facility]    Progress Note    Khurram [place] Patient Status:  Observation    10/24/1955 MRN [id number]   Location [facility] 3W/SW Attending Krishan Landry MD   Hosp Day # 0 PCP Yolanda Parikh MD       SUBJECTIVE:  Feeling f Flush 0.9 % injection 3 mL 3 mL Intravenous PRN   Heparin Sodium (Porcine) 5000 UNIT/ML injection 5,000 Units 5,000 Units Subcutaneous 2 times per day   acetaminophen (TYLENOL) tab 650 mg 650 mg Oral Q6H PRN   ondansetron HCl (ZOFRAN) injection 4 mg 4 mg I Ulnar impaction syndrome     ROGELIO (obstructive sleep apnea)     Morbid obesity with BMI of 50.0-59.9, adult (HCC)     Pre-diabetes     Binge eating     Encounter for therapeutic drug monitoring     Cough     Type 2 diabetes mellitus without retinopathy ([facility]

## 2018-07-13 NOTE — RESPIRATORY THERAPY NOTE
ROGELIO ASSESSMENT:    Pt does not have a previous diagnosis of ROGELIO. Pt does not routinely use a CPAP device at home. This pt is suspected to be at high risk for ROGELIO and sleep lab packet was provided to patient for outpatient follow-up.     RECOMENDATIONS:    C

## 2018-07-13 NOTE — CONSULTS
[facility] AND [facility]  [facility] Cardiology Consult Note    Nabil Daniel Patient Status:  Observation    10/24/1955 MRN [id number]   [facility] 3W/SW Attending Janay Becker MD   Hosp Day # 0 PCP Thersia Gaucher, MD     63year old Female, antihypertensives as noted above    ADD  - takes Adderall      Risks/benefits of cath discussed,including 1/100 chance of MI stroke or death patient is agreeable   No Dye allergy  Have d/w internationalist,  Orders placed, on the schedule for this PM 69  Resp: 20  BP: 143/60    Intake/Output:     Intake/Output Summary (Last 24 hours) at 07/13/18 1548  Last data filed at 07/13/18 0914   Gross per 24 hour   Intake              480 ml   Output              350 ml   Net              130 ml         Asif

## 2018-07-13 NOTE — DIABETES ED
[facility] HOSP - [facility]   Diabetes Education Note      Hugo Mosquera Patient Status Observation   10/24/1955  MRN [id number]  Location  [facility] 3W/SW  Attending Peewee Davis MD  Hospital Days # 0  PCP  Julissa Felix MD    Reason for

## 2018-07-14 ENCOUNTER — APPOINTMENT (OUTPATIENT)
Dept: INTERVENTIONAL RADIOLOGY/VASCULAR | Facility: HOSPITAL | Age: 63
End: 2018-07-14
Attending: NURSE PRACTITIONER
Payer: COMMERCIAL

## 2018-07-14 ENCOUNTER — PRIOR ORIGINAL RECORDS (OUTPATIENT)
Dept: OTHER | Age: 63
End: 2018-07-14

## 2018-07-14 VITALS
TEMPERATURE: 98 F | DIASTOLIC BLOOD PRESSURE: 89 MMHG | OXYGEN SATURATION: 97 % | HEART RATE: 83 BPM | SYSTOLIC BLOOD PRESSURE: 169 MMHG | WEIGHT: 258.13 LBS | HEIGHT: 59 IN | RESPIRATION RATE: 16 BRPM | BODY MASS INDEX: 52.04 KG/M2

## 2018-07-14 LAB
ANION GAP SERPL CALC-SCNC: 8 MMOL/L (ref 0–18)
BASOPHILS # BLD: 0 K/UL (ref 0–0.2)
BASOPHILS NFR BLD: 1 %
BUN SERPL-MCNC: 19 MG/DL (ref 8–20)
BUN/CREAT SERPL: 25.7 (ref 10–20)
CALCIUM SERPL-MCNC: 8.8 MG/DL (ref 8.5–10.5)
CHLORIDE SERPL-SCNC: 101 MMOL/L (ref 95–110)
CO2 SERPL-SCNC: 28 MMOL/L (ref 22–32)
CREAT SERPL-MCNC: 0.74 MG/DL (ref 0.5–1.5)
EOSINOPHIL # BLD: 0.2 K/UL (ref 0–0.7)
EOSINOPHIL NFR BLD: 2 %
ERYTHROCYTE [DISTWIDTH] IN BLOOD BY AUTOMATED COUNT: 14.3 % (ref 11–15)
GLUCOSE BLDC GLUCOMTR-MCNC: 127 MG/DL (ref 70–99)
GLUCOSE BLDC GLUCOMTR-MCNC: 136 MG/DL (ref 70–99)
GLUCOSE SERPL-MCNC: 137 MG/DL (ref 70–99)
HCT VFR BLD AUTO: 38.1 % (ref 35–48)
HGB BLD-MCNC: 12.8 G/DL (ref 12–16)
LYMPHOCYTES # BLD: 1.7 K/UL (ref 1–4)
LYMPHOCYTES NFR BLD: 22 %
MCH RBC QN AUTO: 29.6 PG (ref 27–32)
MCHC RBC AUTO-ENTMCNC: 33.7 G/DL (ref 32–37)
MCV RBC AUTO: 88 FL (ref 80–100)
MONOCYTES # BLD: 0.5 K/UL (ref 0–1)
MONOCYTES NFR BLD: 6 %
NEUTROPHILS # BLD AUTO: 5.2 K/UL (ref 1.8–7.7)
NEUTROPHILS NFR BLD: 69 %
OSMOLALITY UR CALC.SUM OF ELEC: 288 MOSM/KG (ref 275–295)
PLATELET # BLD AUTO: 233 K/UL (ref 140–400)
PMV BLD AUTO: 8.5 FL (ref 7.4–10.3)
POTASSIUM SERPL-SCNC: 3.6 MMOL/L (ref 3.3–5.1)
RBC # BLD AUTO: 4.33 M/UL (ref 3.7–5.4)
SODIUM SERPL-SCNC: 137 MMOL/L (ref 136–144)
WBC # BLD AUTO: 7.5 K/UL (ref 4–11)

## 2018-07-14 PROCEDURE — 4A023N7 MEASUREMENT OF CARDIAC SAMPLING AND PRESSURE, LEFT HEART, PERCUTANEOUS APPROACH: ICD-10-PCS | Performed by: INTERNAL MEDICINE

## 2018-07-14 PROCEDURE — B2111ZZ FLUOROSCOPY OF MULTIPLE CORONARY ARTERIES USING LOW OSMOLAR CONTRAST: ICD-10-PCS | Performed by: INTERNAL MEDICINE

## 2018-07-14 PROCEDURE — 99217 OBSERVATION CARE DISCHARGE: CPT | Performed by: HOSPITALIST

## 2018-07-14 PROCEDURE — B2151ZZ FLUOROSCOPY OF LEFT HEART USING LOW OSMOLAR CONTRAST: ICD-10-PCS | Performed by: INTERNAL MEDICINE

## 2018-07-14 RX ORDER — SODIUM CHLORIDE 9 MG/ML
INJECTION, SOLUTION INTRAVENOUS CONTINUOUS
Status: ACTIVE | OUTPATIENT
Start: 2018-07-14 | End: 2018-07-14

## 2018-07-14 RX ORDER — MIDAZOLAM HYDROCHLORIDE 1 MG/ML
INJECTION INTRAMUSCULAR; INTRAVENOUS
Status: COMPLETED
Start: 2018-07-14 | End: 2018-07-14

## 2018-07-14 RX ORDER — HEPARIN SODIUM 1000 [USP'U]/ML
INJECTION, SOLUTION INTRAVENOUS; SUBCUTANEOUS
Status: COMPLETED
Start: 2018-07-14 | End: 2018-07-14

## 2018-07-14 RX ORDER — ASPIRIN 81 MG/1
324 TABLET, CHEWABLE ORAL ONCE
Status: COMPLETED | OUTPATIENT
Start: 2018-07-14 | End: 2018-07-14

## 2018-07-14 RX ORDER — VERAPAMIL HYDROCHLORIDE 2.5 MG/ML
INJECTION, SOLUTION INTRAVENOUS
Status: COMPLETED
Start: 2018-07-14 | End: 2018-07-14

## 2018-07-14 RX ORDER — LIDOCAINE HYDROCHLORIDE 20 MG/ML
INJECTION, SOLUTION EPIDURAL; INFILTRATION; INTRACAUDAL; PERINEURAL
Status: COMPLETED
Start: 2018-07-14 | End: 2018-07-14

## 2018-07-14 RX ORDER — NITROGLYCERIN 20 MG/100ML
INJECTION INTRAVENOUS
Status: COMPLETED
Start: 2018-07-14 | End: 2018-07-14

## 2018-07-14 RX ORDER — POTASSIUM CHLORIDE 20 MEQ/1
40 TABLET, EXTENDED RELEASE ORAL EVERY 4 HOURS
Status: COMPLETED | OUTPATIENT
Start: 2018-07-14 | End: 2018-07-14

## 2018-07-14 NOTE — DISCHARGE SUMMARY
[facility]    Discharge Summary    Amarjitcalixto Clancy Patient Status:  Observation    10/24/1955 MRN [id number]   Location [facility] 3W/SW Attending Bayron Tubbs MD   Hosp Day # 0 PCP Craig Velasquez MD     Date of Admission: 7 been having chest discomfort in the precordial area for the last 5 days. Came into the emergency department for evaluation. EKG showed sinus rhythm with no acute ST-T changes. D-dimer, troponin, CBC, chemistry, and chest x-ray were unremarkable.   The pa TEST Strp      Check blood sugar 1 time daily   Quantity:  100 strip  Refills:  1     MORENO MICROLET LANCETS Misc      Use 1 lancet daily to check blood sugar   Quantity:  100 each  Refills:  1     Insulin Pen Needle 32G X 6 MM Misc  Commonly known as:  NO

## 2018-07-14 NOTE — PROGRESS NOTES
Chart reviewed, patient examined. Discussed situation with patient and . Admitted with atypical chest pain. Had normal troponin and EKG. Nuclear stress test shows subtle reversible ischemia of the lateral wall.     Cardiac cath and possible PCI r

## 2018-07-14 NOTE — PROCEDURES
Preop: Chest pain, abnormal nuclear stress test  Postop: Same  Procedure: Left heart cath, LV and coronary angiogram.  Right radial approach. Findings: LVEDP equal 15. LV angiogram normal, estimated EF 70%. Normal coronary arteries.     Sedation: Verse

## 2018-07-14 NOTE — PROGRESS NOTES
Post cardiac cath  Right wrist with vascular band in place. Site without hematoma or active bleeding. Pleth wave form noted and recorded in merge document. Dr. Bishop Ruiz reviewed results with patient and spouse.    Transferred patient to 344 pm remote te

## 2018-07-14 NOTE — PRE-SEDATION ASSESSMENT
Pre-Procedure Sedation Assessment    Chief Complaint/Indication for procedure: Chest pain, abnormal nuclear stress test.    History of snoring or sleep or apnea?    Yes    History of previous problems with anesthesia or sedation  No    Physical Findings:  N

## 2018-07-17 ENCOUNTER — TELEPHONE (OUTPATIENT)
Dept: NEPHROLOGY | Facility: CLINIC | Age: 63
End: 2018-07-17

## 2018-07-17 DIAGNOSIS — R07.9 CHEST PAIN, UNSPECIFIED TYPE: Primary | ICD-10-CM

## 2018-07-17 NOTE — TELEPHONE ENCOUNTER
requesting a referral for Cardiology [facility].  Pt has hospital follow up with cardiologist on 7/19/18 Thank you

## 2018-07-17 NOTE — TELEPHONE ENCOUNTER
Referral signed and sent to [facility] for insurance authorization. Sent as STAT.

## 2018-07-18 LAB
BUN: 19 MG/DL
CALCIUM: 8.8 MG/DL
CHLORIDE: 101 MEQ/L
CHOLESTEROL, TOTAL: 164 MG/DL
CREATININE, SERUM: 0.74 MG/DL
GLUCOSE: 137 MG/DL
HDL CHOLESTEROL: 34 MG/DL
HEMATOCRIT: 38.1 %
HEMOGLOBIN: 12.8 G/DL
LDL CHOLESTEROL: 83 MG/DL
NON-HDL CHOLESTEROL: 130 MG/DL
PLATELETS: 233 K/UL
POTASSIUM, SERUM: 3.6 MEQ/L
RED BLOOD COUNT: 4.33 X 10-6/U
SODIUM: 137 MEQ/L
TRIGLYCERIDES: 233 MG/DL
WHITE BLOOD COUNT: 7.5 X 10-3/U

## 2018-07-19 ENCOUNTER — PRIOR ORIGINAL RECORDS (OUTPATIENT)
Dept: OTHER | Age: 63
End: 2018-07-19

## 2018-07-19 ENCOUNTER — MYAURORA ACCOUNT LINK (OUTPATIENT)
Dept: OTHER | Age: 63
End: 2018-07-19

## 2018-07-19 NOTE — TELEPHONE ENCOUNTER
Pt called to request that referral be faxed to Dr. Marlee Lara office at fax # [phone]. Pt has appt this morning.

## 2018-07-23 ENCOUNTER — OFFICE VISIT (OUTPATIENT)
Dept: DERMATOLOGY CLINIC | Facility: CLINIC | Age: 63
End: 2018-07-23

## 2018-07-23 DIAGNOSIS — D23.9 BENIGN NEOPLASM OF SKIN, UNSPECIFIED LOCATION: ICD-10-CM

## 2018-07-23 DIAGNOSIS — L21.9 SEBORRHEIC DERMATITIS: Primary | ICD-10-CM

## 2018-07-23 DIAGNOSIS — L30.9 DERMATITIS: ICD-10-CM

## 2018-07-23 PROCEDURE — 99212 OFFICE O/P EST SF 10 MIN: CPT | Performed by: DERMATOLOGY

## 2018-07-23 PROCEDURE — 99213 OFFICE O/P EST LOW 20 MIN: CPT | Performed by: DERMATOLOGY

## 2018-07-23 RX ORDER — FLUOCINONIDE 0.5 MG/ML
SOLUTION TOPICAL
Qty: 60 ML | Refills: 12 | Status: SHIPPED | OUTPATIENT
Start: 2018-07-23 | End: 2020-06-24 | Stop reason: ALTCHOICE

## 2018-07-23 RX ORDER — HYDROCORTISONE 25 MG/ML
LOTION TOPICAL
Qty: 60 ML | Refills: 3 | Status: SHIPPED | OUTPATIENT
Start: 2018-07-23 | End: 2019-05-13

## 2018-07-23 RX ORDER — KETOCONAZOLE 20 MG/ML
SHAMPOO TOPICAL
Qty: 240 ML | Refills: 12 | Status: SHIPPED | OUTPATIENT
Start: 2018-07-23

## 2018-07-23 RX ORDER — KETOCONAZOLE 20 MG/G
CREAM TOPICAL
Qty: 60 G | Refills: 12 | Status: SHIPPED | OUTPATIENT
Start: 2018-07-23 | End: 2020-02-28

## 2018-08-03 ENCOUNTER — OFFICE VISIT (OUTPATIENT)
Dept: NEPHROLOGY | Facility: CLINIC | Age: 63
End: 2018-08-03

## 2018-08-03 ENCOUNTER — TELEPHONE (OUTPATIENT)
Dept: NEPHROLOGY | Facility: CLINIC | Age: 63
End: 2018-08-03

## 2018-08-03 VITALS
WEIGHT: 261 LBS | HEART RATE: 90 BPM | BODY MASS INDEX: 52.62 KG/M2 | HEIGHT: 59 IN | SYSTOLIC BLOOD PRESSURE: 107 MMHG | DIASTOLIC BLOOD PRESSURE: 58 MMHG

## 2018-08-03 DIAGNOSIS — E78.00 HYPERCHOLESTEROLEMIA: ICD-10-CM

## 2018-08-03 DIAGNOSIS — G47.30 SLEEP APNEA, UNSPECIFIED TYPE: Primary | ICD-10-CM

## 2018-08-03 DIAGNOSIS — E11.9 TYPE 2 DIABETES MELLITUS WITHOUT RETINOPATHY (HCC): ICD-10-CM

## 2018-08-03 DIAGNOSIS — I10 ESSENTIAL HYPERTENSION: ICD-10-CM

## 2018-08-03 PROCEDURE — 99214 OFFICE O/P EST MOD 30 MIN: CPT | Performed by: INTERNAL MEDICINE

## 2018-08-03 PROCEDURE — 99212 OFFICE O/P EST SF 10 MIN: CPT | Performed by: INTERNAL MEDICINE

## 2018-08-03 PROCEDURE — 1111F DSCHRG MED/CURRENT MED MERGE: CPT | Performed by: INTERNAL MEDICINE

## 2018-08-03 RX ORDER — LISDEXAMFETAMINE DIMESYLATE 70 MG/1
70 CAPSULE ORAL DAILY
Refills: 0 | COMMUNITY
Start: 2018-06-18 | End: 2018-08-07

## 2018-08-03 NOTE — PROGRESS NOTES
[facility]  Nephrology Daily Progress Note    Alma English  [id number]  63year old  Patient presents with:  Hospital F/U      HPI:   Alma English is a 63year old female.   24-year-old female with a history of hypertension, hypercholesterolemia, a symptoms  Respiratory:  Negative for cough, dyspnea and wheezing      PHYSICAL EXAM:     Vitals History 7/14/2018 8/3/2018 8/3/2018   /89 - 107/58   BP Location Left arm - -   Patient Position - - -   Cuff Size - - -   Pulse 83 - 90   Resp - - -   Te •  VYVANSE 70 MG Oral Cap, Take 70 mg by mouth daily. , Disp: , Rfl: 0  •  Hydrocortisone 2.5 % External Lotion, Use qd prn redness, scaling and itching on scalp,ears, Disp: 60 mL, Rfl: 3  •  Ketoconazole 2 % External Shampoo, Apply to scalp 3 times per w Inhaler, Rfl: 1    Allergies:  No Known Allergies         ASSESSMENT/PLAN:   Assessment   Sleep apnea, unspecified type  (primary encounter diagnosis)  Hypercholesterolemia  Essential hypertension  Type 2 diabetes mellitus without retinopathy (hcc)    Jeanine

## 2018-08-05 NOTE — PROGRESS NOTES
Zechariah Faith is a 63year old female. HPI:     CC:  Patient presents with:  Derm Problem: LOV 11/6/2017. Pt presenting for f/u with dermatitis. Pt states \"ran out\" of Ketoconazole shampoo, cream and hydrocorisone cream, requesting refills.         Jaziel Jeffers Shampoo Apply to scalp 3 times per week. Disp: 240 mL Rfl: 12   ketoconazole 2 % External Cream Apply to affected area 2 times daily.  Disp: 60 g Rfl: 12   Fluocinonide 0.05 % External Solution Use bid to scalp Disp: 60 mL Rfl: 12   Levothyroxine Sodium 175 both eyes 9/29/2014   • Back problem    • Blepharitis 9/29/2014   • COPD (chronic obstructive pulmonary disease) (HCC)    • Diabetes ([facility]    • Disorder of thyroid     hypothyroid   • Dry eyes, bilateral 9/29/2014   • High blood pressure    • High choleste Occasionally burning itching. Acneiform lesions occasionally was significantly improved with the ketoconazole cream.  Uses shampoo. Occasional more redness and itching  Patient presents with concerns above.     Patient has been in their usual state of hea dermatitis  (primary encounter diagnosis)  Dermatitis  Benign neoplasm of skin, unspecified location  Seborrheic dermatitis. Long-term use of hydrocortisone discussed recommend against this use only when necessary.   Continue ketoconazole has been very hel

## 2018-08-07 ENCOUNTER — OFFICE VISIT (OUTPATIENT)
Dept: SURGERY | Facility: CLINIC | Age: 63
End: 2018-08-07

## 2018-08-07 VITALS
OXYGEN SATURATION: 97 % | HEIGHT: 59 IN | WEIGHT: 258 LBS | DIASTOLIC BLOOD PRESSURE: 90 MMHG | HEART RATE: 83 BPM | RESPIRATION RATE: 18 BRPM | SYSTOLIC BLOOD PRESSURE: 150 MMHG | BODY MASS INDEX: 52.01 KG/M2

## 2018-08-07 DIAGNOSIS — E11.9 TYPE 2 DIABETES MELLITUS WITHOUT RETINOPATHY (HCC): ICD-10-CM

## 2018-08-07 DIAGNOSIS — Z51.81 ENCOUNTER FOR THERAPEUTIC DRUG MONITORING: ICD-10-CM

## 2018-08-07 DIAGNOSIS — R63.2 BINGE EATING: ICD-10-CM

## 2018-08-07 DIAGNOSIS — I10 ESSENTIAL HYPERTENSION: Primary | ICD-10-CM

## 2018-08-07 DIAGNOSIS — G47.33 OSA (OBSTRUCTIVE SLEEP APNEA): ICD-10-CM

## 2018-08-07 DIAGNOSIS — E66.01 MORBID OBESITY WITH BMI OF 50.0-59.9, ADULT (HCC): ICD-10-CM

## 2018-08-07 DIAGNOSIS — E78.00 PURE HYPERCHOLESTEROLEMIA: ICD-10-CM

## 2018-08-07 PROCEDURE — 99214 OFFICE O/P EST MOD 30 MIN: CPT | Performed by: INTERNAL MEDICINE

## 2018-08-07 RX ORDER — LISDEXAMFETAMINE DIMESYLATE 70 MG/1
70 CAPSULE ORAL DAILY
Qty: 30 CAPSULE | Refills: 0 | Status: SHIPPED | OUTPATIENT
Start: 2018-09-06 | End: 2018-10-06

## 2018-08-07 NOTE — PROGRESS NOTES
[facility] BARIATRIC AND WEIGHT LOSS CLINIC  [address] [address]  Dept: [phone]     Date:   18    Patient:  Francine Manjarrez  :      10/24/1955  MRN:      [id number]    Chief Complaint:  Patient presents with:   Livan Julio scalp,ears Disp: 60 mL Rfl: 3   Ketoconazole 2 % External Shampoo Apply to scalp 3 times per week. Disp: 240 mL Rfl: 12   ketoconazole 2 % External Cream Apply to affected area 2 times daily.  Disp: 60 g Rfl: 12   Fluocinonide 0.05 % External Solution Use b 7/29/2000    Smokeless tobacco: Never Used    Comment: 2 UNITS/DAY     Alcohol use Yes    Comment: social - monthly    Drug use: No    Sexual activity: Yes    Partners: Male    Comment: none     Other Topics Concern    Pt has a pacemaker No    Pt has a def walking    ROS:    Constitutional: positive for fatigue  Respiratory: negative  Cardiovascular: negative  Gastrointestinal: negative  Musculoskeletal:positive for back pain  Neurological: negative  Behavioral/Psych: negative  Endocrine: negative  All oth weight management    HYPERTENSION: Blood pressure stable on the above medications. No interval change in antihypertensive medication.      ROGELIO: Patient was instructed to continue wearing their CPaP as recommended- Patient states that she does not wear her C

## 2018-08-17 RX ORDER — METOPROLOL SUCCINATE 25 MG/1
TABLET, EXTENDED RELEASE ORAL
Qty: 90 TABLET | Refills: 1 | Status: SHIPPED | OUTPATIENT
Start: 2018-08-17 | End: 2019-05-07

## 2018-08-17 RX ORDER — LOSARTAN POTASSIUM AND HYDROCHLOROTHIAZIDE 25; 100 MG/1; MG/1
TABLET ORAL
Qty: 90 TABLET | Refills: 1 | Status: SHIPPED | OUTPATIENT
Start: 2018-08-17 | End: 2019-01-16

## 2018-09-21 ENCOUNTER — TELEPHONE (OUTPATIENT)
Dept: NEPHROLOGY | Facility: CLINIC | Age: 63
End: 2018-09-21

## 2018-09-21 DIAGNOSIS — E66.3 SEVERELY OVERWEIGHT: Primary | ICD-10-CM

## 2018-09-21 NOTE — TELEPHONE ENCOUNTER
Dr. Maddison Davis,    Patient called [facility] requesting a referral to follow up with Dr. Anupama Watts. Per pt has an appt on Monday. Please advise and sign off on referral if you agree.       Thank you, Nanci Mazariegos Small-Referral Specialist.

## 2018-09-24 ENCOUNTER — OFFICE VISIT (OUTPATIENT)
Dept: SURGERY | Facility: CLINIC | Age: 63
End: 2018-09-24

## 2018-09-24 VITALS
DIASTOLIC BLOOD PRESSURE: 82 MMHG | BODY MASS INDEX: 51.89 KG/M2 | HEIGHT: 59 IN | WEIGHT: 257.38 LBS | SYSTOLIC BLOOD PRESSURE: 130 MMHG

## 2018-09-24 DIAGNOSIS — I10 ESSENTIAL HYPERTENSION: ICD-10-CM

## 2018-09-24 DIAGNOSIS — R63.2 BINGE EATING: ICD-10-CM

## 2018-09-24 DIAGNOSIS — Z51.81 ENCOUNTER FOR THERAPEUTIC DRUG MONITORING: ICD-10-CM

## 2018-09-24 DIAGNOSIS — E78.00 PURE HYPERCHOLESTEROLEMIA: Primary | ICD-10-CM

## 2018-09-24 DIAGNOSIS — E66.01 MORBID OBESITY WITH BMI OF 50.0-59.9, ADULT (HCC): ICD-10-CM

## 2018-09-24 DIAGNOSIS — E11.9 TYPE 2 DIABETES MELLITUS WITHOUT RETINOPATHY (HCC): ICD-10-CM

## 2018-09-24 PROCEDURE — 99214 OFFICE O/P EST MOD 30 MIN: CPT | Performed by: INTERNAL MEDICINE

## 2018-09-24 NOTE — PROGRESS NOTES
[address] BARIATRIC AND WEIGHT LOSS CLINIC  [address] [place] [address]  Dept: [phone]     Date:   18    Patient:  Tanya Orellana  :      10/24/1955  MRN:      [id number]    Chief Complaint:  Patient presents with:   Macrina Castellanos Disp: 90 tablet Rfl: 1   LOSARTAN POTASSIUM-HCTZ 100-25 MG Oral Tab TAKE 1 TABLET BY MOUTH DAILY Disp: 90 tablet Rfl: 1   VYVANSE 70 MG Oral Cap Take 1 capsule (70 mg total) by mouth daily.  Disp: 30 capsule Rfl: 0   MetFORMIN HCl 500 MG Oral Tab Take 1 tab TABLET(20 MG) BY MOUTH DAILY ) Disp: 90 tablet Rfl: 1     Allergies:  Patient has no known allergies.      Social History:    Social History    Socioeconomic History      Marital status:       Spouse name: Not on file      Number of children: Not on Relation Age of Onset   • Hypertension Father    • Heart Disease Father    • Heart Disorder Mother    • Heart Disease Mother    • Diabetes Neg    • Glaucoma Neg    • Retinal detachment Neg    • Macular degeneration Neg    • Cancer Neg        Food Journal murmur, click, rub or gallop, regular rate and rhythm  Abdomen: soft, non-tender; bowel sounds normal; no masses,  no organomegaly  Extremities: extremities normal, atraumatic, no cyanosis or edema  Pulses: 2+ and symmetric  Skin: Skin color, texture, turg Goals for next month:  1. Keep a food log using [facility]  2. Drink 48-64 ounces of water per day. No fruit juices or regular soda. 3. Increase aerobic exercises (goal is 150 minutes per week)  4. Increase fruit and vegetable servings/day  5.  Keep carbs at

## 2018-10-02 ENCOUNTER — OFFICE VISIT (OUTPATIENT)
Dept: INTERNAL MEDICINE CLINIC | Facility: CLINIC | Age: 63
End: 2018-10-02

## 2018-10-02 VITALS
BODY MASS INDEX: 52.15 KG/M2 | HEART RATE: 85 BPM | SYSTOLIC BLOOD PRESSURE: 132 MMHG | WEIGHT: 258.69 LBS | TEMPERATURE: 98 F | DIASTOLIC BLOOD PRESSURE: 72 MMHG | HEIGHT: 59 IN

## 2018-10-02 DIAGNOSIS — J01.00 ACUTE NON-RECURRENT MAXILLARY SINUSITIS: ICD-10-CM

## 2018-10-02 DIAGNOSIS — I10 ESSENTIAL HYPERTENSION: ICD-10-CM

## 2018-10-02 DIAGNOSIS — J02.9 SORE THROAT: Primary | ICD-10-CM

## 2018-10-02 PROCEDURE — 99212 OFFICE O/P EST SF 10 MIN: CPT | Performed by: PHYSICIAN ASSISTANT

## 2018-10-02 PROCEDURE — 99213 OFFICE O/P EST LOW 20 MIN: CPT | Performed by: PHYSICIAN ASSISTANT

## 2018-10-02 PROCEDURE — 87880 STREP A ASSAY W/OPTIC: CPT | Performed by: PHYSICIAN ASSISTANT

## 2018-10-02 RX ORDER — FLUTICASONE PROPIONATE 50 MCG
2 SPRAY, SUSPENSION (ML) NASAL DAILY
Qty: 1 BOTTLE | Refills: 3 | Status: SHIPPED | OUTPATIENT
Start: 2018-10-02 | End: 2020-09-28

## 2018-10-02 RX ORDER — AMOXICILLIN AND CLAVULANATE POTASSIUM 875; 125 MG/1; MG/1
1 TABLET, FILM COATED ORAL 2 TIMES DAILY
Qty: 20 TABLET | Refills: 0 | Status: SHIPPED | OUTPATIENT
Start: 2018-10-02 | End: 2018-10-12

## 2018-10-02 NOTE — PROGRESS NOTES
HPI:    Patient ID: Hugo Mosquera is a 63year old female. HPI   Patient with history of hypertension, hyperlipidemia, COPD, ROGELIO, hypothyroidism, type 2 diabetes and obesity presents complaining of sore throat and enlarged tonsils since Saturday.  She h MOUTH DAILY Disp: 90 tablet Rfl: 1   VYVANSE 70 MG Oral Cap Take 1 capsule (70 mg total) by mouth daily.  Disp: 30 capsule Rfl: 0   MetFORMIN HCl 500 MG Oral Tab Take 1 tablet (500 mg total) by mouth daily with breakfast. Disp: 30 tablet Rfl: 2   Hydrocorti Allergies   PHYSICAL EXAM:   Physical Exam   Nursing note and vitals reviewed. Constitutional: She appears well-developed and well-nourished. HENT:   Head: Normocephalic and atraumatic.    Right Ear: External ear normal. Tympanic membrane is erythematou Patient has not taken antihypertensives today. Advised to resume medications as directed and contact the office if pressure consistently greater than 130/80. No orders of the defined types were placed in this encounter.       Meds This Visit:  Requested

## 2018-10-11 ENCOUNTER — TELEPHONE (OUTPATIENT)
Dept: NEPHROLOGY | Facility: CLINIC | Age: 63
End: 2018-10-11

## 2018-10-11 RX ORDER — AZITHROMYCIN 250 MG/1
TABLET, FILM COATED ORAL
Qty: 1 PACKAGE | Refills: 0 | Status: SHIPPED | OUTPATIENT
Start: 2018-10-11 | End: 2019-01-16

## 2018-10-11 NOTE — TELEPHONE ENCOUNTER
Pt returned call. Symptoms started several days ago but getting worse with very sore throat, green nasal drainage. Feels hot no temp taken. Msg routed to on call Dr. Luciano Quinonez. Will call pt once addressed by doctor.

## 2018-10-11 NOTE — TELEPHONE ENCOUNTER
Tried to call pt x 2. Line was busy and a man from the testing side answered both times and said to try calling later as the phone line to reach pt was busy and it bounces to the . Will try calling back later.

## 2018-10-11 NOTE — TELEPHONE ENCOUNTER
Left pt msgs on home and cell that rx has been called in and to contact office if symptoms do not improve. Rx sent.

## 2018-10-11 NOTE — TELEPHONE ENCOUNTER
Pt states she has a sore throat and a fever. Pt requesting antibiotics to treat symptoms. , Please call thank you [phone] or (work) [phone]

## 2018-10-11 NOTE — TELEPHONE ENCOUNTER
LMTCB When did symptoms start? What is her tempeture? Was it taken with a thermometer? Is she having any other symptoms? Msg will be sent to on call doctor once pt returns call with these answers.

## 2018-10-23 ENCOUNTER — OFFICE VISIT (OUTPATIENT)
Dept: NEPHROLOGY | Facility: CLINIC | Age: 63
End: 2018-10-23

## 2018-10-23 VITALS
HEART RATE: 85 BPM | DIASTOLIC BLOOD PRESSURE: 82 MMHG | HEIGHT: 58 IN | WEIGHT: 283 LBS | SYSTOLIC BLOOD PRESSURE: 136 MMHG | BODY MASS INDEX: 59.41 KG/M2 | TEMPERATURE: 98 F | RESPIRATION RATE: 20 BRPM

## 2018-10-23 DIAGNOSIS — J30.9 ALLERGIC RHINITIS, UNSPECIFIED SEASONALITY, UNSPECIFIED TRIGGER: Primary | ICD-10-CM

## 2018-10-23 PROCEDURE — 99213 OFFICE O/P EST LOW 20 MIN: CPT | Performed by: INTERNAL MEDICINE

## 2018-10-23 PROCEDURE — 99212 OFFICE O/P EST SF 10 MIN: CPT | Performed by: INTERNAL MEDICINE

## 2018-10-23 NOTE — PATIENT INSTRUCTIONS
Try Allegra or Zyrtec for your allergy symptoms. Let me know if symptoms do not improve. Follow-up with Dr. Jerica Davidson.

## 2018-10-23 NOTE — PROGRESS NOTES
[facility]  Nephrology Daily Progress Note    Jesus Manuel Merritt  [id number]  63year old  Patient presents with:  Routine Physical: Cough x 2 weeks  Cough/URI: sleepy, head stuffy      HPI:   Jesus Manuel Merritt is a 63year old female.   60-year-old female wi and responsive no acute distress noted  Neck/Thyroid: neck is supple without adenopathy  Lymphatic: no abnormal cervical, supraclavicular or axillary adenopathy is noted  Respiratory: normal to inspection lungs are clear to auscultation bilaterally normal METOPROLOL SUCCINATE ER 25 MG Oral Tablet 24 Hr, TAKE 1 TABLET BY MOUTH DAILY, Disp: 90 tablet, Rfl: 1  •  LOSARTAN POTASSIUM-HCTZ 100-25 MG Oral Tab, TAKE 1 TABLET BY MOUTH DAILY, Disp: 90 tablet, Rfl: 1  •  Hydrocortisone 2.5 % External Lotion, Use qd pr taking differently: Take 20 mg by mouth nightly.  TAKE 1 TABLET(20 MG) BY MOUTH DAILY ), Disp: 90 tablet, Rfl: 1    Allergies:  No Known Allergies         ASSESSMENT/PLAN:   Assessment   Allergic rhinitis, unspecified seasonality, unspecified trigger  (prim

## 2019-01-04 ENCOUNTER — TELEPHONE (OUTPATIENT)
Dept: NEPHROLOGY | Facility: CLINIC | Age: 64
End: 2019-01-04

## 2019-01-04 DIAGNOSIS — E11.9 DIABETES MELLITUS WITHOUT COMPLICATION (HCC): ICD-10-CM

## 2019-01-04 DIAGNOSIS — L21.9 SEBORRHEIC DERMATITIS: Primary | ICD-10-CM

## 2019-01-04 NOTE — TELEPHONE ENCOUNTER
Pt requesting referral for Dermatology - Dr. Marissa Byrne for her skin issues and for Endo - Dr. Erika Holland for her weight gain & feeling tired. Pt informed to contact [facility] for any updates, thanks.

## 2019-01-14 RX ORDER — LEVOTHYROXINE SODIUM 175 UG/1
TABLET ORAL
Qty: 30 TABLET | Refills: 2 | Status: SHIPPED | OUTPATIENT
Start: 2019-01-14 | End: 2019-03-01

## 2019-01-16 ENCOUNTER — OFFICE VISIT (OUTPATIENT)
Dept: SURGERY | Facility: CLINIC | Age: 64
End: 2019-01-16

## 2019-01-16 VITALS
SYSTOLIC BLOOD PRESSURE: 164 MMHG | HEIGHT: 58 IN | WEIGHT: 257 LBS | DIASTOLIC BLOOD PRESSURE: 82 MMHG | OXYGEN SATURATION: 94 % | BODY MASS INDEX: 53.95 KG/M2 | HEART RATE: 84 BPM | RESPIRATION RATE: 18 BRPM

## 2019-01-16 DIAGNOSIS — Z51.81 ENCOUNTER FOR THERAPEUTIC DRUG MONITORING: ICD-10-CM

## 2019-01-16 DIAGNOSIS — F43.9 STRESS: ICD-10-CM

## 2019-01-16 DIAGNOSIS — I10 ESSENTIAL HYPERTENSION: Primary | ICD-10-CM

## 2019-01-16 DIAGNOSIS — E78.00 HYPERCHOLESTEROLEMIA: ICD-10-CM

## 2019-01-16 DIAGNOSIS — R63.2 INCREASED APPETITE: ICD-10-CM

## 2019-01-16 DIAGNOSIS — E11.9 TYPE 2 DIABETES MELLITUS WITHOUT RETINOPATHY (HCC): ICD-10-CM

## 2019-01-16 DIAGNOSIS — E66.01 MORBID OBESITY WITH BMI OF 50.0-59.9, ADULT (HCC): ICD-10-CM

## 2019-01-16 PROCEDURE — 99214 OFFICE O/P EST MOD 30 MIN: CPT | Performed by: INTERNAL MEDICINE

## 2019-01-16 NOTE — PROGRESS NOTES
[facility] CLINIC  [address] [address]  Dept: [phone]     Date:   18    Patient:  Elver Barroso  :      10/24/1955  MRN:      [id number]    Chief Complaint:  Patient presents with:   Daphney Figueredo Suspension 2 sprays by Each Nare route daily. Disp: 1 Bottle Rfl: 3   Liraglutide -Weight Management (SAXENDA) 18 MG/3ML Subcutaneous Solution Pen-injector Inject 3 mg into the skin daily.  Disp: 5 pen Rfl: 2   METOPROLOL SUCCINATE ER 25 MG Oral Tablet 24 H Social History    Socioeconomic History      Marital status:       Spouse name: Not on file      Number of children: Not on file      Years of education: Not on file      Highest education level: Not on file    Social Needs      Financial resource Heart Disease Mother    • Diabetes Neg    • Glaucoma Neg    • Retinal detachment Neg    • Macular degeneration Neg    • Cancer Neg        Food Journal  · Reviewed and Discussed:       · Patient has a Food Journal?: no   · Patient is reading nutrition label masses,  no organomegaly  Extremities: extremities normal, atraumatic, no cyanosis or edema  Pulses: 2+ and symmetric  Skin: Skin color, texture, turgor normal. No rashes or lesions    ASSESSMENT     HYPERTENSION:  The patient's blood pressure has been wel fruit juices or regular soda. 3. Increase aerobic exercises (goal is 150 minutes per week)  4. Increase fruit and vegetable servings/day  5. Keep carbs at or below 100g/day  6. Avoid skipping meals  7.  Prepare meals and snacks in advance    Binge eating:

## 2019-01-21 ENCOUNTER — OFFICE VISIT (OUTPATIENT)
Dept: DERMATOLOGY CLINIC | Facility: CLINIC | Age: 64
End: 2019-01-21

## 2019-01-21 DIAGNOSIS — L82.1 SEBORRHEIC KERATOSES: ICD-10-CM

## 2019-01-21 DIAGNOSIS — D23.9 BENIGN NEOPLASM OF SKIN, UNSPECIFIED LOCATION: ICD-10-CM

## 2019-01-21 DIAGNOSIS — L30.9 DERMATITIS: ICD-10-CM

## 2019-01-21 DIAGNOSIS — L21.9 SEBORRHEIC DERMATITIS: Primary | ICD-10-CM

## 2019-01-21 PROCEDURE — 99212 OFFICE O/P EST SF 10 MIN: CPT | Performed by: DERMATOLOGY

## 2019-01-21 PROCEDURE — 99213 OFFICE O/P EST LOW 20 MIN: CPT | Performed by: DERMATOLOGY

## 2019-01-21 RX ORDER — FLUOCINONIDE 0.5 MG/ML
SOLUTION TOPICAL
Qty: 60 ML | Refills: 12 | Status: SHIPPED | OUTPATIENT
Start: 2019-01-21 | End: 2019-05-13

## 2019-01-21 RX ORDER — TRIAMCINOLONE ACETONIDE 5 MG/G
CREAM TOPICAL
Qty: 30 G | Refills: 3 | Status: SHIPPED | OUTPATIENT
Start: 2019-01-21 | End: 2020-03-05 | Stop reason: ALTCHOICE

## 2019-01-21 RX ORDER — KETOCONAZOLE 20 MG/ML
SHAMPOO TOPICAL
Qty: 120 ML | Refills: 12 | Status: SHIPPED | OUTPATIENT
Start: 2019-01-21 | End: 2019-05-13

## 2019-01-21 NOTE — PROGRESS NOTES
Scalp better soln and shampoo    poss ros more pso patches on forehead and brows    Use vaseline, aloe, coconut oil  no change on face no new products

## 2019-01-22 ENCOUNTER — OFFICE VISIT (OUTPATIENT)
Dept: NEPHROLOGY | Facility: CLINIC | Age: 64
End: 2019-01-22

## 2019-01-22 ENCOUNTER — APPOINTMENT (OUTPATIENT)
Dept: LAB | Facility: HOSPITAL | Age: 64
End: 2019-01-22
Attending: INTERNAL MEDICINE
Payer: COMMERCIAL

## 2019-01-22 ENCOUNTER — TELEPHONE (OUTPATIENT)
Dept: NEPHROLOGY | Facility: CLINIC | Age: 64
End: 2019-01-22

## 2019-01-22 VITALS
DIASTOLIC BLOOD PRESSURE: 71 MMHG | WEIGHT: 261 LBS | HEART RATE: 81 BPM | HEIGHT: 60 IN | SYSTOLIC BLOOD PRESSURE: 128 MMHG | BODY MASS INDEX: 51.24 KG/M2 | TEMPERATURE: 99 F

## 2019-01-22 DIAGNOSIS — E11.9 DIABETES MELLITUS WITHOUT COMPLICATION (HCC): ICD-10-CM

## 2019-01-22 DIAGNOSIS — R10.9 ACUTE RIGHT FLANK PAIN: Primary | ICD-10-CM

## 2019-01-22 DIAGNOSIS — I10 ESSENTIAL HYPERTENSION: ICD-10-CM

## 2019-01-22 DIAGNOSIS — R30.0 DYSURIA: ICD-10-CM

## 2019-01-22 DIAGNOSIS — R30.0 DYSURIA: Primary | ICD-10-CM

## 2019-01-22 LAB
BILIRUB UR QL: NEGATIVE
CLARITY UR: CLEAR
COLOR UR: YELLOW
GLUCOSE UR-MCNC: NEGATIVE MG/DL
HGB UR QL STRIP.AUTO: NEGATIVE
KETONES UR-MCNC: NEGATIVE MG/DL
LEUKOCYTE ESTERASE UR QL STRIP.AUTO: NEGATIVE
NITRITE UR QL STRIP.AUTO: NEGATIVE
PH UR: 5 [PH] (ref 5–8)
PROT UR-MCNC: NEGATIVE MG/DL
SP GR UR STRIP: 1.02 (ref 1–1.03)
UROBILINOGEN UR STRIP-ACNC: <2
VIT C UR-MCNC: NEGATIVE MG/DL

## 2019-01-22 PROCEDURE — 99212 OFFICE O/P EST SF 10 MIN: CPT | Performed by: INTERNAL MEDICINE

## 2019-01-22 PROCEDURE — 87086 URINE CULTURE/COLONY COUNT: CPT

## 2019-01-22 PROCEDURE — 99214 OFFICE O/P EST MOD 30 MIN: CPT | Performed by: INTERNAL MEDICINE

## 2019-01-22 PROCEDURE — 81003 URINALYSIS AUTO W/O SCOPE: CPT

## 2019-01-22 NOTE — TELEPHONE ENCOUNTER
Tommy Sales from [facility] 150 lab calling. Pt is there now. States she just saw [address] [facility] and was told to go to lab for urine test. Call Tommy Sales when order entered at ext [phone].

## 2019-01-22 NOTE — TELEPHONE ENCOUNTER
Spoke to [facility]. Order UA and urine culture. VORB. Orders entered. [address] notified.

## 2019-01-23 NOTE — TELEPHONE ENCOUNTER
Spoke to pt's  and asked him to let Jose Guadalupe Hurtado know that [facility] also ordered routine fasting labs to do.

## 2019-01-23 NOTE — PROGRESS NOTES
[facility], [facility]  Nephrology Daily Progress Note    Jesus Manuel Merritt  [id number]  66year old  Patient presents with:  Flank Pain: C/o flank pain x 4 days. denies urinary symptoms, fever or chills      HPI:   Jesus Manuel Merritt is a 66year old female.   63-year-o Constitutional: appears well hydrated alert and responsive no acute distress noted  Neck/Thyroid: neck is supple without adenopathy  Lymphatic: no abnormal cervical, supraclavicular or axillary adenopathy is noted  Respiratory: normal to inspection l mouth daily. , Disp: 30 capsule, Rfl: 0  •  [START ON 3/17/2019] Lisdexamfetamine Dimesylate (VYVANSE) 70 MG Oral Cap, Take 1 capsule (70 mg total) by mouth every morning., Disp: 30 capsule, Rfl: 0  •  LEVOTHYROXINE SODIUM 175 MCG Oral Tab, TAKE 1 TABLET(17 Insulin Pen Needle (NOVOFINE) 32G X 6 MM Does not apply Misc, Use one needle with each use, Disp: 1 Box, Rfl: 3  •  Blood Glucose Monitoring Suppl ([employer] CONTOUR NEXT MONITOR) w/Device Does not apply Kit, 1 each by In Vitro route daily.  Check blood sugar 1 (Glycohemoglobin) [E]      CBC W Differential W Platelet      Comp Metabolic Panel (14)      Lipid Panel      1/22/2019  Cathy Wren MD

## 2019-01-23 NOTE — PATIENT INSTRUCTIONS
Let me know if your abdominal pain does not improve. Please do follow-up laboratory studies as ordered.

## 2019-01-23 NOTE — TELEPHONE ENCOUNTER
Please tell the patient she is also due for routine follow-up blood test.  Please have her do. I ordered.

## 2019-01-27 ENCOUNTER — LAB ENCOUNTER (OUTPATIENT)
Dept: LAB | Facility: HOSPITAL | Age: 64
End: 2019-01-27
Attending: INTERNAL MEDICINE
Payer: COMMERCIAL

## 2019-01-27 DIAGNOSIS — E03.9 HYPOTHYROIDISM, UNSPECIFIED TYPE: ICD-10-CM

## 2019-01-27 DIAGNOSIS — I10 ESSENTIAL HYPERTENSION: ICD-10-CM

## 2019-01-27 DIAGNOSIS — R10.9 ACUTE RIGHT FLANK PAIN: ICD-10-CM

## 2019-01-27 DIAGNOSIS — E11.9 DIABETES MELLITUS WITHOUT COMPLICATION (HCC): ICD-10-CM

## 2019-01-27 LAB
ALBUMIN SERPL BCP-MCNC: 3.5 G/DL (ref 3.5–4.8)
ALBUMIN/GLOB SERPL: 1.1 {RATIO} (ref 1–2)
ALP SERPL-CCNC: 64 U/L (ref 32–100)
ALT SERPL-CCNC: 16 U/L (ref 14–54)
ANION GAP SERPL CALC-SCNC: 13 MMOL/L (ref 0–18)
AST SERPL-CCNC: 14 U/L (ref 15–41)
BASOPHILS # BLD: 0.1 K/UL (ref 0–0.2)
BASOPHILS NFR BLD: 1 %
BILIRUB SERPL-MCNC: 0.9 MG/DL (ref 0.3–1.2)
BUN SERPL-MCNC: 21 MG/DL (ref 8–20)
BUN/CREAT SERPL: 31.3 (ref 10–20)
CALCIUM SERPL-MCNC: 9 MG/DL (ref 8.5–10.5)
CHLORIDE SERPL-SCNC: 101 MMOL/L (ref 95–110)
CHOLEST SERPL-MCNC: 167 MG/DL (ref 110–200)
CO2 SERPL-SCNC: 27 MMOL/L (ref 22–32)
CREAT SERPL-MCNC: 0.67 MG/DL (ref 0.5–1.5)
EOSINOPHIL # BLD: 0.2 K/UL (ref 0–0.7)
EOSINOPHIL NFR BLD: 2 %
ERYTHROCYTE [DISTWIDTH] IN BLOOD BY AUTOMATED COUNT: 14.5 % (ref 11–15)
EST. AVERAGE GLUCOSE BLD GHB EST-MCNC: 146 MG/DL (ref 68–126)
GLOBULIN PLAS-MCNC: 3.3 G/DL (ref 2.5–3.7)
GLUCOSE SERPL-MCNC: 125 MG/DL (ref 70–99)
HBA1C MFR BLD HPLC: 6.7 % (ref ?–5.7)
HCT VFR BLD AUTO: 40.2 % (ref 35–48)
HDLC SERPL-MCNC: 42 MG/DL
HGB BLD-MCNC: 13 G/DL (ref 12–16)
LDLC SERPL CALC-MCNC: 104 MG/DL (ref 0–99)
LYMPHOCYTES # BLD: 1.7 K/UL (ref 1–4)
LYMPHOCYTES NFR BLD: 20 %
MCH RBC QN AUTO: 29.1 PG (ref 27–32)
MCHC RBC AUTO-ENTMCNC: 32.5 G/DL (ref 32–37)
MCV RBC AUTO: 89.5 FL (ref 80–100)
MONOCYTES # BLD: 0.5 K/UL (ref 0–1)
MONOCYTES NFR BLD: 5 %
NEUTROPHILS # BLD AUTO: 6.3 K/UL (ref 1.8–7.7)
NEUTROPHILS NFR BLD: 72 %
NONHDLC SERPL-MCNC: 125 MG/DL
OSMOLALITY UR CALC.SUM OF ELEC: 296 MOSM/KG (ref 275–295)
PATIENT FASTING: YES
PLATELET # BLD AUTO: 252 K/UL (ref 140–400)
PMV BLD AUTO: 8.5 FL (ref 7.4–10.3)
POTASSIUM SERPL-SCNC: 3.9 MMOL/L (ref 3.3–5.1)
PROT SERPL-MCNC: 6.8 G/DL (ref 5.9–8.4)
RBC # BLD AUTO: 4.49 M/UL (ref 3.7–5.4)
SODIUM SERPL-SCNC: 141 MMOL/L (ref 136–144)
TRIGL SERPL-MCNC: 104 MG/DL (ref 1–149)
TSH SERPL-ACNC: 3.26 UIU/ML (ref 0.45–5.33)
WBC # BLD AUTO: 8.7 K/UL (ref 4–11)

## 2019-01-27 PROCEDURE — 80053 COMPREHEN METABOLIC PANEL: CPT

## 2019-01-27 PROCEDURE — 36415 COLL VENOUS BLD VENIPUNCTURE: CPT

## 2019-01-27 PROCEDURE — 84443 ASSAY THYROID STIM HORMONE: CPT

## 2019-01-27 PROCEDURE — 85025 COMPLETE CBC W/AUTO DIFF WBC: CPT

## 2019-01-27 PROCEDURE — 80061 LIPID PANEL: CPT

## 2019-01-27 PROCEDURE — 83036 HEMOGLOBIN GLYCOSYLATED A1C: CPT

## 2019-01-31 NOTE — PROGRESS NOTES
Emelia Baca is a 66year old female. HPI:     CC:  Patient presents with:  Dermatitis: LOV 7/23/2018 Patient present to f/u on Seborrhic Dermatitis. Patient c/o condition is worse.  .Patient denies any hx of skin cancer        Allergies:  Patient has no to scalp 2 times per week. Disp: 120 mL Rfl: 12   Fluocinonide 0.05 % External Solution Use bid to scalp prn Disp: 60 mL Rfl: 12   Liraglutide -Weight Management (SAXENDA) 18 MG/3ML Subcutaneous Solution Pen-injector Inject 3 mg into the skin daily.  Disp: TAKE 1 TABLET(20 MG) BY MOUTH DAILY (Patient taking differently: Take 20 mg by mouth nightly.  TAKE 1 TABLET(20 MG) BY MOUTH DAILY ) Disp: 90 tablet Rfl: 1   Sertraline HCl 50 MG Oral Tab Take 1/2 tablet by mouth daily for 1 week, then 1 tablet every evenin resource strain: Not on file      Food insecurity - worry: Not on file      Food insecurity - inability: Not on file      Transportation needs - medical: Not on file      Transportation needs - non-medical: Not on file    Occupational History      Occupati scalp better soln and shampoo are helping.     poss rosacea and complaining more psoriasiform patches on forehead and brows  Has been using vaseline, aloe, coconut oil  no change on face. No new products. Concern with dry patches.   Notes rough scaly patc External Cream 30 g 3     Sig: Use bid to affected areas of skin   • Ketoconazole 2 % External Shampoo 120 mL 12     Sig: Apply to scalp 2 times per week.    • Fluocinonide 0.05 % External Solution 60 mL 12     Sig: Use bid to scalp prn         Seborrheic d reviewed. See  Medications in grid. Instructions reviewed at length. Benign nevi, seborrheic  keratoses, cherry angiomas:  Reassurance regarding other benign skin lesions. Signs and symptoms of skin cancer, ABCDE's of melanoma discussed with patient.  Carlos Johnson

## 2019-02-05 ENCOUNTER — OFFICE VISIT (OUTPATIENT)
Dept: ENDOCRINOLOGY CLINIC | Facility: CLINIC | Age: 64
End: 2019-02-05

## 2019-02-05 ENCOUNTER — APPOINTMENT (OUTPATIENT)
Dept: LAB | Facility: HOSPITAL | Age: 64
End: 2019-02-05
Attending: INTERNAL MEDICINE
Payer: COMMERCIAL

## 2019-02-05 VITALS
WEIGHT: 256.63 LBS | BODY MASS INDEX: 50 KG/M2 | SYSTOLIC BLOOD PRESSURE: 142 MMHG | HEART RATE: 100 BPM | DIASTOLIC BLOOD PRESSURE: 85 MMHG

## 2019-02-05 DIAGNOSIS — Z13.9 SCREENING FOR CONDITION: ICD-10-CM

## 2019-02-05 DIAGNOSIS — E03.9 HYPOTHYROIDISM, UNSPECIFIED TYPE: Primary | ICD-10-CM

## 2019-02-05 DIAGNOSIS — E11.65 TYPE 2 DIABETES MELLITUS WITH HYPERGLYCEMIA, WITHOUT LONG-TERM CURRENT USE OF INSULIN (HCC): ICD-10-CM

## 2019-02-05 DIAGNOSIS — E66.09 OBESITY DUE TO EXCESS CALORIES WITH SERIOUS COMORBIDITY, UNSPECIFIED CLASSIFICATION: ICD-10-CM

## 2019-02-05 DIAGNOSIS — Z13.29 THYROID DISORDER SCREENING: ICD-10-CM

## 2019-02-05 DIAGNOSIS — E55.9 VITAMIN D DEFICIENCY: ICD-10-CM

## 2019-02-05 LAB
T4 FREE SERPL-MCNC: 1.06 NG/DL (ref 0.58–1.64)
VIT B12 SERPL-MCNC: 274 PG/ML (ref 181–914)

## 2019-02-05 PROCEDURE — 82306 VITAMIN D 25 HYDROXY: CPT

## 2019-02-05 PROCEDURE — 82607 VITAMIN B-12: CPT

## 2019-02-05 PROCEDURE — 99244 OFF/OP CNSLTJ NEW/EST MOD 40: CPT | Performed by: INTERNAL MEDICINE

## 2019-02-05 PROCEDURE — 36415 COLL VENOUS BLD VENIPUNCTURE: CPT

## 2019-02-05 PROCEDURE — 84439 ASSAY OF FREE THYROXINE: CPT

## 2019-02-05 NOTE — H&P
New Patient Visit - Diabetes    CONSULT - Reason for Visit:  Diabetes management, Obesity  Requesting Physician: Dr. Mayank Valle:  Patient presents with:  Consult: Borderline DM Type 2, has been feeling very tired for the past 2 weeks.  Last capsule (70 mg total) by mouth daily. Disp: 30 capsule Rfl: 0   [START ON 3/17/2019] Lisdexamfetamine Dimesylate (VYVANSE) 70 MG Oral Cap Take 1 capsule (70 mg total) by mouth every morning.  Disp: 30 capsule Rfl: 0   LEVOTHYROXINE SODIUM 175 MCG Oral Tab T MG) BY MOUTH DAILY ) Disp: 90 tablet Rfl: 1   Sertraline HCl 50 MG Oral Tab Take 1/2 tablet by mouth daily for 1 week, then 1 tablet every evening Disp: 90 tablet Rfl: 0       PAST MEDICAL HISTORY:   Past Medical History:   Diagnosis Date   • Abnormal mamm Reaction to local anesthetic: No        Exercise: No      FAMILY HISTORY:   Family History   Problem Relation Age of Onset   • Hypertension Father    • Heart Disease Father    • Heart Disorder Mother    • Heart Disease Mother    • Diabetes Neg    • [place] pulses, no edema, no skin lesions      DATA:     A1c is 6.7 %    ASSESSMENT AND PLAN:    1. Type 2 DM:     Plan:  Discussed the pathogenesis, natural course of diabetes.  Patient understands the importance of glycemic control and the implications of uncontr might help with cravings. - continue to FU with SAJAN Mejia    Patient verbalized a complete  understanding of all of the above and did not have any further questions. 5. Fatigue:  - Vitamin D level    6.  Hypothyroidism  TSH normal on LT 4 175 mcg da

## 2019-02-06 ENCOUNTER — TELEPHONE (OUTPATIENT)
Dept: NEPHROLOGY | Facility: CLINIC | Age: 64
End: 2019-02-06

## 2019-02-06 ENCOUNTER — TELEPHONE (OUTPATIENT)
Dept: ENDOCRINOLOGY CLINIC | Facility: CLINIC | Age: 64
End: 2019-02-06

## 2019-02-06 DIAGNOSIS — Z12.31 SCREENING MAMMOGRAM, ENCOUNTER FOR: Primary | ICD-10-CM

## 2019-02-06 DIAGNOSIS — R79.89 LOW VITAMIN D LEVEL: Primary | ICD-10-CM

## 2019-02-06 LAB — 25(OH)D3 SERPL-MCNC: 24.8 NG/ML (ref 30–100)

## 2019-02-06 NOTE — TELEPHONE ENCOUNTER
Free T4 normal  Vit D is low at 24.8, should be around 30. This can cause fatigue. Recommend:  Ergocalciferol  50,000 units q week x 8 week, followed by q month. Repeat 25 OH Vit D in three months.    Vit B 12 is in the normal range, but as dicussed dur

## 2019-02-07 ENCOUNTER — TELEPHONE (OUTPATIENT)
Dept: ENDOCRINOLOGY CLINIC | Facility: CLINIC | Age: 64
End: 2019-02-07

## 2019-02-07 DIAGNOSIS — E53.8 VITAMIN B12 DEFICIENCY: Primary | ICD-10-CM

## 2019-02-08 ENCOUNTER — TELEPHONE (OUTPATIENT)
Dept: NEPHROLOGY | Facility: CLINIC | Age: 64
End: 2019-02-08

## 2019-02-08 RX ORDER — UBIDECARENONE 75 MG
100 CAPSULE ORAL DAILY
Refills: 0 | COMMUNITY
Start: 2019-02-08 | End: 2020-09-28

## 2019-02-08 RX ORDER — ERGOCALCIFEROL (VITAMIN D2) 1250 MCG
CAPSULE ORAL
Qty: 10 CAPSULE | Refills: 0 | Status: SHIPPED | OUTPATIENT
Start: 2019-02-08 | End: 2019-05-13

## 2019-02-08 RX ORDER — UBIDECARENONE 75 MG
100 CAPSULE ORAL DAILY
Refills: 0 | COMMUNITY
Start: 2019-02-08 | End: 2019-02-08 | Stop reason: CLARIF

## 2019-02-08 NOTE — TELEPHONE ENCOUNTER
Spoke with Maurice Marcano. She verbalizes understanding of lab results as written by provider below. She is agreeable to starting ergocalciferol. She is aware she will need to repeat Vit D in 3 months. Medication and lab ordered as written.

## 2019-02-08 NOTE — TELEPHONE ENCOUNTER
Contacted pt. Notified her of MKK's message below. She will start Vitamin B12 100 mcg PO once daily and will repeat B12 level in 3 months.

## 2019-02-08 NOTE — TELEPHONE ENCOUNTER
It is in the low normal range. Better if it were a little higher. Start Vitamin B12, 100 ugm po qd. Vitamin B12 level in 3 mo.

## 2019-02-12 NOTE — TELEPHONE ENCOUNTER
Patient contacted. She will take oral Vitamin B12 100 mcg daily as Dr. Pratibha Sanchez advised. See 2/7/19 TE.

## 2019-02-16 ENCOUNTER — HOSPITAL ENCOUNTER (OUTPATIENT)
Dept: MAMMOGRAPHY | Age: 64
Discharge: HOME | End: 2019-02-16
Attending: INTERNAL MEDICINE
Payer: COMMERCIAL

## 2019-02-16 DIAGNOSIS — Z12.31 SCREENING MAMMOGRAM, ENCOUNTER FOR: ICD-10-CM

## 2019-02-16 PROCEDURE — 77067 SCR MAMMO BI INCL CAD: CPT | Performed by: INTERNAL MEDICINE

## 2019-02-16 PROCEDURE — 77063 BREAST TOMOSYNTHESIS BI: CPT | Performed by: INTERNAL MEDICINE

## 2019-02-25 RX ORDER — ROSUVASTATIN CALCIUM 20 MG/1
TABLET, COATED ORAL
Qty: 90 TABLET | Refills: 1 | Status: SHIPPED | OUTPATIENT
Start: 2019-02-25 | End: 2020-09-03

## 2019-02-25 NOTE — TELEPHONE ENCOUNTER
LOV 1/22/19. Last lipid panel was done on 1/27/19. Refill pended and routed to Dr. Hilda Mclaughlin.

## 2019-02-27 ENCOUNTER — OFFICE VISIT (OUTPATIENT)
Dept: INTERNAL MEDICINE CLINIC | Facility: CLINIC | Age: 64
End: 2019-02-27

## 2019-02-27 VITALS
HEIGHT: 60 IN | DIASTOLIC BLOOD PRESSURE: 80 MMHG | BODY MASS INDEX: 49.87 KG/M2 | SYSTOLIC BLOOD PRESSURE: 126 MMHG | HEART RATE: 86 BPM | WEIGHT: 254 LBS

## 2019-02-27 DIAGNOSIS — J44.1 COPD WITH ACUTE EXACERBATION (HCC): Primary | ICD-10-CM

## 2019-02-27 DIAGNOSIS — N95.0 POST-MENOPAUSAL BLEEDING: ICD-10-CM

## 2019-02-27 PROCEDURE — 99212 OFFICE O/P EST SF 10 MIN: CPT | Performed by: PHYSICIAN ASSISTANT

## 2019-02-27 PROCEDURE — 99214 OFFICE O/P EST MOD 30 MIN: CPT | Performed by: PHYSICIAN ASSISTANT

## 2019-02-27 RX ORDER — AZITHROMYCIN 250 MG/1
TABLET, FILM COATED ORAL
Qty: 6 TABLET | Refills: 0 | Status: SHIPPED | OUTPATIENT
Start: 2019-02-27 | End: 2019-05-13

## 2019-02-27 NOTE — PROGRESS NOTES
HPI:    Patient ID: Susanne Schulte is a 66year old female. HPI   Patient with history of hypertension, hyperlipidemia, COPD, ROGELIO, hypothyroidism, and type 2 diabetes presents complaining of upper respiratory symptoms for several weeks.  Symptoms have be Oral Cap Take 1 capsule (50,000 units total) by mouth once per week for 8 weeks. Then, take 1 capsule once per month.  Disp: 10 capsule Rfl: 0   Dulaglutide (TRULICITY) 0.30 WC/8.2FE Subcutaneous Solution Pen-injector Inject 0.75 mg into the skin once a wee Soln Inhale 2 puffs into the lungs 4 (four) times daily as needed for Wheezing.  Disp: 1 Inhaler Rfl: 1   LOSARTAN POTASSIUM-HCTZ 100-25 MG Oral Tab TAKE 1 TABLET BY MOUTH DAILY Disp: 90 tablet Rfl: 1   Insulin Pen Needle (NOVOFINE) 32G X 6 MM Does not appl exacerbation. Empirically treat with azithromycin as directed. Use Flonase as directed. Albuterol inhaler 1-2 puffs twice daily. Contact the office if symptoms worsen or do not improve in the next 2-3 days. Can consider addition of Prednisone if needed.  Fo

## 2019-02-28 VITALS
DIASTOLIC BLOOD PRESSURE: 84 MMHG | HEART RATE: 83 BPM | SYSTOLIC BLOOD PRESSURE: 120 MMHG | RESPIRATION RATE: 18 BRPM | BODY MASS INDEX: 47.11 KG/M2 | HEIGHT: 62 IN | WEIGHT: 256 LBS

## 2019-03-01 ENCOUNTER — NURSE TRIAGE (OUTPATIENT)
Dept: OTHER | Age: 64
End: 2019-03-01

## 2019-03-01 RX ORDER — LEVOTHYROXINE SODIUM 175 UG/1
TABLET ORAL
Qty: 90 TABLET | Refills: 1 | Status: SHIPPED | OUTPATIENT
Start: 2019-03-01 | End: 2019-07-28

## 2019-03-01 RX ORDER — METHYLPREDNISOLONE 4 MG/1
TABLET ORAL
Qty: 1 KIT | Refills: 0 | Status: SHIPPED | OUTPATIENT
Start: 2019-03-01 | End: 2019-05-13

## 2019-03-01 NOTE — TELEPHONE ENCOUNTER
Spoke with patient ( verified) and relayed LR message below--patient verbalizes understanding and agreement. No further questions/concerns at this time.

## 2019-03-01 NOTE — TELEPHONE ENCOUNTER
Medrol Dosepak as directed. Patient should monitor blood sugars and diet very carefully while on the medication. Follow-up if not soon better.

## 2019-03-01 NOTE — TELEPHONE ENCOUNTER
Patient recently seen 2/27/19 by Sujatha Bronson for cough, wheezing, and sputum production, sinus pressure and sore throat. Given azithromycin 250 MG Oral Tab (6 tabs) patient stated, \"I was told to return if symptoms worsen or fail to improve\".   Patient

## 2019-03-04 ENCOUNTER — TELEPHONE (OUTPATIENT)
Dept: OTHER | Age: 64
End: 2019-03-04

## 2019-03-04 NOTE — TELEPHONE ENCOUNTER
Spoke with patient. Advised to increase the dose of Vitamin B12 to 2000 units daily. Continue prescription vitamin D and once completed can transition to OTC vitamin D 2425-3130 units daily. Recheck levels in about 3 months as ordered.  Can consider B12 inj

## 2019-03-04 NOTE — TELEPHONE ENCOUNTER
Pt's PCP is Dr. Ayana Hammond, states he asked about B12 injections, and was advised to take B12 orally. Pt states she still feels weak. Pt calling states she's been taking otc Vitamin B12, and prescription Vitamin D weekly.  Pt states she still feels weak, and w

## 2019-03-11 ENCOUNTER — OFFICE VISIT (OUTPATIENT)
Dept: SURGERY | Facility: CLINIC | Age: 64
End: 2019-03-11

## 2019-03-11 VITALS
BODY MASS INDEX: 51.2 KG/M2 | RESPIRATION RATE: 16 BRPM | SYSTOLIC BLOOD PRESSURE: 140 MMHG | HEIGHT: 59 IN | WEIGHT: 254 LBS | DIASTOLIC BLOOD PRESSURE: 80 MMHG

## 2019-03-11 DIAGNOSIS — I10 ESSENTIAL HYPERTENSION: ICD-10-CM

## 2019-03-11 DIAGNOSIS — G47.33 OSA (OBSTRUCTIVE SLEEP APNEA): ICD-10-CM

## 2019-03-11 DIAGNOSIS — E66.01 MORBID OBESITY WITH BMI OF 50.0-59.9, ADULT (HCC): ICD-10-CM

## 2019-03-11 DIAGNOSIS — E11.9 TYPE 2 DIABETES MELLITUS WITHOUT RETINOPATHY (HCC): Primary | ICD-10-CM

## 2019-03-11 DIAGNOSIS — F43.9 STRESS: ICD-10-CM

## 2019-03-11 PROCEDURE — 99214 OFFICE O/P EST MOD 30 MIN: CPT | Performed by: INTERNAL MEDICINE

## 2019-03-11 NOTE — PROGRESS NOTES
[facility]  [address] [facility]  [address] [place] [address]  Dept: [phone]     Date:   18    Patient:  Nancy Del Rosario  :      10/24/1955  MRN:      [id number]    Chief Complaint:  Patient presents with:   Britany Walker Tab TAKE 1 TABLET(175 MCG) BY MOUTH BEFORE BREAKFAST Disp: 90 tablet Rfl: 1   azithromycin 250 MG Oral Tab Take two tablets by mouth today, then one daily.  Disp: 6 tablet Rfl: 0   Rosuvastatin Calcium 20 MG Oral Tab TAKE 1 TABLET BY MOUTH DAILY Disp: 90 ta g Rfl: 12   Fluocinonide 0.05 % External Solution Use bid to scalp Disp: 60 mL Rfl: 12   Insulin Pen Needle (BD PEN NEEDLE BETSY U/F) 32G X 4 MM Does not apply Misc Use a new needle with each use Disp: 1 Box Rfl: 2   Albuterol Sulfate  (90 Base) MCG/ Lifestyle      Physical activity:        Days per week: Not on file        Minutes per session: Not on file      Stress: Not on file    Relationships      Social connections:        Talks on phone: Not on file        Gets together: Not on file        Atten yes  · Average Caloric Intake:   unknown  · Average CHO Intake: > 120  · Is patient exercising? no  · Type of exercise?  ADL's    Eating Habits  · Patient states the following:  · Eats 3 meal(s) per day  · Length of time it takes to consume a meal:  20  · # controlled. she has been checking it as instructed and has remained in relatively good control. HYPERCHOLESTEROLEMIA:  The patient states that her cholesterol has been well controlled on her current medication.     Lab Results   Component Value Date/Amos saxenda replaced by trulicity per endo team.   Follow up with endo team    Blood work done    Should come to seminar to hear about surgical options, if still interested in going surgical route    Continue metformin  Increase to BID    Follow up in 4-6

## 2019-03-12 RX ORDER — DULAGLUTIDE 0.75 MG/.5ML
INJECTION, SOLUTION SUBCUTANEOUS
Qty: 2 ML | Refills: 0 | OUTPATIENT
Start: 2019-03-12

## 2019-03-12 NOTE — TELEPHONE ENCOUNTER
LOV 2/5/19 with RTC 3 months. Per chart note, dose increased Trulicity 5.10 mg qweek ---> 1.5 mg qweek. Pended order for provider. Please d/c 0.75 mg dose.

## 2019-03-16 ENCOUNTER — HOSPITAL ENCOUNTER (OUTPATIENT)
Dept: ULTRASOUND IMAGING | Facility: HOSPITAL | Age: 64
Discharge: HOME | End: 2019-03-16
Attending: PHYSICIAN ASSISTANT
Payer: COMMERCIAL

## 2019-03-16 DIAGNOSIS — N95.0 POST-MENOPAUSAL BLEEDING: ICD-10-CM

## 2019-03-16 PROCEDURE — 76830 TRANSVAGINAL US NON-OB: CPT | Performed by: PHYSICIAN ASSISTANT

## 2019-03-16 PROCEDURE — 76856 US EXAM PELVIC COMPLETE: CPT | Performed by: PHYSICIAN ASSISTANT

## 2019-03-20 ENCOUNTER — TELEPHONE (OUTPATIENT)
Dept: ENDOCRINOLOGY CLINIC | Facility: CLINIC | Age: 64
End: 2019-03-20

## 2019-03-20 NOTE — TELEPHONE ENCOUNTER
Trulicity 1.5 mg q-week requires PA. Received PA form from [address] [place] [address]. Completed form and faxed back to [phone].

## 2019-03-21 NOTE — TELEPHONE ENCOUNTER
[facility] states PA not required at this time for Trulicity. RN called pharm and states it went through and pt already p/u med.

## 2019-03-27 ENCOUNTER — TELEPHONE (OUTPATIENT)
Dept: SURGERY | Facility: CLINIC | Age: 64
End: 2019-03-27

## 2019-03-27 NOTE — TELEPHONE ENCOUNTER
Called and left message for patient to call back to inform patient IHP Bariatric checklist was approved by [employer].  Need to know if patient already attended seminar (not seeing on any of the attendance sheets) and let her know that once she

## 2019-05-02 ENCOUNTER — TELEPHONE (OUTPATIENT)
Dept: NEPHROLOGY | Facility: CLINIC | Age: 64
End: 2019-05-02

## 2019-05-02 NOTE — TELEPHONE ENCOUNTER
Pt states she is experiencing pain on right hand - states she cant put pressure or weight on hand. Pt thinks it might be carpal tunnel. States pain starts on palm and radiates to fingers. Pls call - aware MKK is not in office. Thank you.

## 2019-05-03 NOTE — TELEPHONE ENCOUNTER
Pt requesting 90 days refills for Trulicity. Pt states she is completely out. Pls call. Thank you. Current Outpatient Medications:  Dulaglutide (TRULICITY) 1.5 WY/8.1BF Subcutaneous Solution Pen-injector Inject 1.5 mg into the skin every 7 days.  Khushi Perez

## 2019-05-07 ENCOUNTER — OFFICE VISIT (OUTPATIENT)
Dept: NEPHROLOGY | Facility: CLINIC | Age: 64
End: 2019-05-07

## 2019-05-07 ENCOUNTER — HOSPITAL ENCOUNTER (OUTPATIENT)
Dept: GENERAL RADIOLOGY | Facility: HOSPITAL | Age: 64
Discharge: HOME | End: 2019-05-07
Attending: INTERNAL MEDICINE
Payer: COMMERCIAL

## 2019-05-07 VITALS
HEART RATE: 93 BPM | SYSTOLIC BLOOD PRESSURE: 139 MMHG | DIASTOLIC BLOOD PRESSURE: 91 MMHG | WEIGHT: 258.38 LBS | HEIGHT: 59 IN | BODY MASS INDEX: 52.09 KG/M2

## 2019-05-07 DIAGNOSIS — M25.541 ARTHRALGIA OF RIGHT HAND: ICD-10-CM

## 2019-05-07 DIAGNOSIS — G47.30 SLEEP APNEA, UNSPECIFIED TYPE: ICD-10-CM

## 2019-05-07 DIAGNOSIS — N95.0 POSTMENOPAUSAL BLEEDING: ICD-10-CM

## 2019-05-07 DIAGNOSIS — M25.541 ARTHRALGIA OF RIGHT HAND: Primary | ICD-10-CM

## 2019-05-07 PROCEDURE — 99212 OFFICE O/P EST SF 10 MIN: CPT | Performed by: INTERNAL MEDICINE

## 2019-05-07 PROCEDURE — 99214 OFFICE O/P EST MOD 30 MIN: CPT | Performed by: INTERNAL MEDICINE

## 2019-05-07 PROCEDURE — 73130 X-RAY EXAM OF HAND: CPT | Performed by: INTERNAL MEDICINE

## 2019-05-07 RX ORDER — LISINOPRIL AND HYDROCHLOROTHIAZIDE 25; 20 MG/1; MG/1
1 TABLET ORAL DAILY
Qty: 90 TABLET | Refills: 1 | Status: SHIPPED | OUTPATIENT
Start: 2019-05-07 | End: 2019-10-21

## 2019-05-07 RX ORDER — METOPROLOL SUCCINATE 25 MG/1
25 TABLET, EXTENDED RELEASE ORAL
Qty: 90 TABLET | Refills: 1 | Status: SHIPPED | OUTPATIENT
Start: 2019-05-07 | End: 2019-10-21

## 2019-05-07 NOTE — MR AVS SNAPSHOT
[facility] [place] [id number] [phone]               Thank you for choosing us for your health care visit with Slick Kilgore MD.  We are glad to serve you and happy to provide you with this summary of your v [facility] specialty fax refill request for [facility] #[id number]    Appt:  Last seen 04/28/18    Refill 30 days needs appt    Losartan Potassium-HCTZ 100-25 MG Tabs   TAKE 1 TABLET BY MOUTH DAILY   Commonly known as:  HYZAAR           * Metoprolol Succinate ER 25 MG Tb24   TAKE 1 TABLET BY MOUTH DAILY   Commonly known as:   Toprol XL           * Metoprolol Succinate ER 25 MG Tb24 Assoc Dx:  Essential hypertension [I10], Hypercholesterolemia [E78.00], ROGELIO (obstructive sleep apnea) [G47.33], Binge eating [R63.2], Encounter for therapeutic drug monitoring [Z51.81], Pre-diabetes [R73.03], Morbid obesity with BMI of 50.0-59.9, adult (H for therapeutic drug monitoring [Z51.81], Pre-diabetes [R73.03], Morbid obesity with BMI of 50.0-59.9, adult ([facility] [E66.01, Z68.43]                 Follow-up Instructions     Return in about 4 weeks (around 3/6/2017).          MyChart     Visit MyChart  You

## 2019-05-08 NOTE — PATIENT INSTRUCTIONS
Call me if you do not get a referral to see the gynecologist or for the home sleep study. Resume metoprolol and start lisinopril HCT. Check your blood pressures daily and call me in a week.

## 2019-05-08 NOTE — PROGRESS NOTES
[facility], [facility]  Nephrology Daily Progress Note    Oralia Alfonso  [id number]  66year old  Patient presents with:  Hand Pain: C/o right hand pain, denies injury      HPI:   Oralia Alfonso is a 66year old female.   30-year-old female with a history of hype 3/11/2019 5/7/2019 5/7/2019   /80 - 139/91   BP Location - - -   Patient Position - - -   Cuff Size - - -   Pulse - - 93   Resp 16 - -   Temp - - -   SpO2 - - -   Weight 254 lbs - 258 lbs 6 oz   Height 59.000 - 59.000   BODY MASS INDEX - 52.19 - Take 1 tablet by mouth daily. , Disp: 90 tablet, Rfl: 1  •  Dulaglutide (TRULICITY) 1.5 GF/7.6ZU Subcutaneous Solution Pen-injector, Inject 1.5 mg into the skin every 7 days. , Disp: 12 pen, Rfl: 0  •  metFORMIN HCl 500 MG Oral Tab, Take 1 tablet (500 mg tot Tablet Therapy Pack, As directed., Disp: 1 kit, Rfl: 0  •  azithromycin 250 MG Oral Tab, Take two tablets by mouth today, then one daily. , Disp: 6 tablet, Rfl: 0  •  ergocalciferol 77326 units Oral Cap, Take 1 capsule (50,000 units total) by mouth once per without retinopathy ([address]     Age-related nuclear cataract of both eyes     Acute chest pain     Azotemia     Hyperglycemia     Increased appetite     Stress      The patient may have DJD involving her right thumb but also may have a component of carpal tu

## 2019-05-09 ENCOUNTER — TELEPHONE (OUTPATIENT)
Dept: NEPHROLOGY | Facility: CLINIC | Age: 64
End: 2019-05-09

## 2019-05-09 DIAGNOSIS — M25.539 ACUTE WRIST PAIN, UNSPECIFIED LATERALITY: Primary | ICD-10-CM

## 2019-05-09 NOTE — TELEPHONE ENCOUNTER
X-rays of the hand just shows mild arthritis. If her symptoms do not improve with wrist splint refer to Dr. Nirav Mills for further work-up and evaluation.

## 2019-05-10 ENCOUNTER — TELEPHONE (OUTPATIENT)
Dept: NEPHROLOGY | Facility: CLINIC | Age: 64
End: 2019-05-10

## 2019-05-10 NOTE — TELEPHONE ENCOUNTER
Patient contacted. Advised no referral is needed for At home Sleep Study. She just calls the Sleep lab, come in to get demonstration of equipment, take it home, sleep and return the equipment the next day.  Phone# provided ([phone])

## 2019-05-10 NOTE — TELEPHONE ENCOUNTER
Would not see rheumatology until she does the sleep study. Check with managed care to see whether or not it has been approved.   The patient wants to do the home sleep study not the in center one

## 2019-05-10 NOTE — TELEPHONE ENCOUNTER
Patient contacted about xray results and she is asking if she should see Rheumatology due to fatigue. She is waiting to hear if referral was approved for home Sleep study (still OPEN in [facility].

## 2019-05-10 NOTE — TELEPHONE ENCOUNTER
Patient contacted. Results message from Dr. Deedee Roque read to patient. She is aware to see Dr. Javan Petersen if wrist splint does not help pain. Referral entered and sent to [facility] for insurance authorization.

## 2019-05-13 ENCOUNTER — TELEPHONE (OUTPATIENT)
Dept: NEPHROLOGY | Facility: CLINIC | Age: 64
End: 2019-05-13

## 2019-05-13 ENCOUNTER — OFFICE VISIT (OUTPATIENT)
Dept: SURGERY | Facility: CLINIC | Age: 64
End: 2019-05-13

## 2019-05-13 VITALS
WEIGHT: 255 LBS | OXYGEN SATURATION: 94 % | RESPIRATION RATE: 18 BRPM | BODY MASS INDEX: 51.41 KG/M2 | HEIGHT: 59 IN | HEART RATE: 80 BPM | DIASTOLIC BLOOD PRESSURE: 80 MMHG | SYSTOLIC BLOOD PRESSURE: 132 MMHG

## 2019-05-13 DIAGNOSIS — R63.2 BINGE EATING: ICD-10-CM

## 2019-05-13 DIAGNOSIS — G47.33 OSA (OBSTRUCTIVE SLEEP APNEA): Primary | ICD-10-CM

## 2019-05-13 DIAGNOSIS — E11.9 TYPE 2 DIABETES MELLITUS WITHOUT RETINOPATHY (HCC): ICD-10-CM

## 2019-05-13 DIAGNOSIS — I10 ESSENTIAL HYPERTENSION: ICD-10-CM

## 2019-05-13 DIAGNOSIS — F43.9 STRESS: ICD-10-CM

## 2019-05-13 DIAGNOSIS — R63.2 INCREASED APPETITE: ICD-10-CM

## 2019-05-13 DIAGNOSIS — E66.01 MORBID OBESITY WITH BMI OF 50.0-59.9, ADULT (HCC): ICD-10-CM

## 2019-05-13 DIAGNOSIS — Z51.81 ENCOUNTER FOR THERAPEUTIC DRUG MONITORING: ICD-10-CM

## 2019-05-13 DIAGNOSIS — E78.00 HYPERCHOLESTEROLEMIA: ICD-10-CM

## 2019-05-13 PROCEDURE — 99214 OFFICE O/P EST MOD 30 MIN: CPT | Performed by: INTERNAL MEDICINE

## 2019-05-13 NOTE — TELEPHONE ENCOUNTER
Pt stopped by office to clarify her BP meds because 2 pharmacists told her 2 separate things. Reviewed chart. At [facility], MKK D/C'd losartan-hct and switched her to lisinopril-hct. She had also run out of metoprolol so she advised her to resume the med.  He sen

## 2019-05-13 NOTE — PROGRESS NOTES
[facility] BARIATRIC AND WEIGHT LOSS CLINIC  [address] [address]  Dept: [phone]     Date:   18    Patient:  Jayy Du  :      10/24/1955  MRN:      [id number]    Chief Complaint:  Patient presents with:   Dina Easton Lisinopril-hydroCHLOROthiazide 20-25 MG Oral Tab Take 1 tablet by mouth daily. Disp: 90 tablet Rfl: 1   Dulaglutide (TRULICITY) 1.5 WG/2.4TB Subcutaneous Solution Pen-injector Inject 1.5 mg into the skin every 7 days.  Disp: 12 pen Rfl: 0   Lisdexamfetami kit Rfl: 0   Glucose Blood ([employer]ORENO CONTOUR NEXT TEST) In Vitro Strip Check blood sugar 1 time daily Disp: 100 strip Rfl: 1   MORENO MICROLET LANCETS Does not apply Misc Use 1 lancet daily to check blood sugar Disp: 100 each Rfl: 1     Allergies:  Patient has Asked        Outdoor occupation: Not Asked        Pt has a pacemaker: No        Pt has a defibrillator: No        Breast feeding: Not Asked        Reaction to local anesthetic: No         Service: Not Asked        Blood Transfusions: Not Asked Drink 64 oz of water per day, Maintain a daily food journal, No drinking 30 minutes before or after meals, Utlize portion control strategies to reduce calorie intake, Identify triggers for eating and manage cues and Eat slowly and take 20 to 30 minutes to eating  Increased appetite  Morbid obesity with bmi of 50.0-59.9, adult ([facility])  Encounter for therapeutic drug monitoring  Stress    PLAN   Given the patient's age, degree of obesity and multiple medical problems outlined above, I do believe that bariatric

## 2019-05-20 ENCOUNTER — TELEPHONE (OUTPATIENT)
Dept: NEPHROLOGY | Facility: CLINIC | Age: 64
End: 2019-05-20

## 2019-05-20 NOTE — TELEPHONE ENCOUNTER
Pt calling for pre op clearance from dr Maggy Walter .  Offered pt first available pt states she is scheduled for surgery on June 3rd

## 2019-05-20 NOTE — TELEPHONE ENCOUNTER
Patient has an appointment already scheduled with Dr. Ena Bright on 5/28/19 at 4:20 pm. Next available is 5/29/19.

## 2019-05-28 ENCOUNTER — OFFICE VISIT (OUTPATIENT)
Dept: NEPHROLOGY | Facility: CLINIC | Age: 64
End: 2019-05-28

## 2019-05-28 VITALS
WEIGHT: 251.38 LBS | HEIGHT: 59 IN | HEART RATE: 83 BPM | DIASTOLIC BLOOD PRESSURE: 63 MMHG | BODY MASS INDEX: 50.68 KG/M2 | SYSTOLIC BLOOD PRESSURE: 139 MMHG

## 2019-05-28 DIAGNOSIS — Z01.818 PREOPERATIVE CLEARANCE: Primary | ICD-10-CM

## 2019-05-28 DIAGNOSIS — I10 ESSENTIAL HYPERTENSION: ICD-10-CM

## 2019-05-28 DIAGNOSIS — E11.9 TYPE 2 DIABETES MELLITUS WITHOUT RETINOPATHY (HCC): ICD-10-CM

## 2019-05-28 PROCEDURE — 99214 OFFICE O/P EST MOD 30 MIN: CPT | Performed by: INTERNAL MEDICINE

## 2019-05-28 PROCEDURE — 99212 OFFICE O/P EST SF 10 MIN: CPT | Performed by: INTERNAL MEDICINE

## 2019-05-29 ENCOUNTER — TELEPHONE (OUTPATIENT)
Dept: ENDOCRINOLOGY CLINIC | Facility: CLINIC | Age: 64
End: 2019-05-29

## 2019-05-29 ENCOUNTER — APPOINTMENT (OUTPATIENT)
Dept: LAB | Facility: HOSPITAL | Age: 64
End: 2019-05-29
Attending: INTERNAL MEDICINE
Payer: COMMERCIAL

## 2019-05-29 DIAGNOSIS — I10 ESSENTIAL HYPERTENSION: ICD-10-CM

## 2019-05-29 DIAGNOSIS — E11.65 TYPE 2 DIABETES MELLITUS WITH HYPERGLYCEMIA, WITHOUT LONG-TERM CURRENT USE OF INSULIN (HCC): ICD-10-CM

## 2019-05-29 DIAGNOSIS — R79.89 LOW VITAMIN D LEVEL: Primary | ICD-10-CM

## 2019-05-29 DIAGNOSIS — E53.8 VITAMIN B12 DEFICIENCY: ICD-10-CM

## 2019-05-29 DIAGNOSIS — R79.89 LOW VITAMIN D LEVEL: ICD-10-CM

## 2019-05-29 DIAGNOSIS — Z01.818 PREOPERATIVE CLEARANCE: ICD-10-CM

## 2019-05-29 DIAGNOSIS — E11.9 TYPE 2 DIABETES MELLITUS WITHOUT RETINOPATHY (HCC): ICD-10-CM

## 2019-05-29 DIAGNOSIS — E11.65 TYPE II DIABETES MELLITUS, UNCONTROLLED (HCC): Primary | ICD-10-CM

## 2019-05-29 PROCEDURE — 84443 ASSAY THYROID STIM HORMONE: CPT

## 2019-05-29 PROCEDURE — 82570 ASSAY OF URINE CREATININE: CPT

## 2019-05-29 PROCEDURE — 93005 ELECTROCARDIOGRAM TRACING: CPT

## 2019-05-29 PROCEDURE — 83036 HEMOGLOBIN GLYCOSYLATED A1C: CPT

## 2019-05-29 PROCEDURE — 85025 COMPLETE CBC W/AUTO DIFF WBC: CPT

## 2019-05-29 PROCEDURE — 82043 UR ALBUMIN QUANTITATIVE: CPT

## 2019-05-29 PROCEDURE — 83540 ASSAY OF IRON: CPT

## 2019-05-29 PROCEDURE — 82607 VITAMIN B-12: CPT

## 2019-05-29 PROCEDURE — 93010 ELECTROCARDIOGRAM REPORT: CPT | Performed by: INTERNAL MEDICINE

## 2019-05-29 PROCEDURE — 82306 VITAMIN D 25 HYDROXY: CPT

## 2019-05-29 PROCEDURE — 80053 COMPREHEN METABOLIC PANEL: CPT

## 2019-05-29 PROCEDURE — 36415 COLL VENOUS BLD VENIPUNCTURE: CPT

## 2019-05-29 PROCEDURE — 84466 ASSAY OF TRANSFERRIN: CPT

## 2019-05-29 PROCEDURE — 80061 LIPID PANEL: CPT

## 2019-05-29 NOTE — TELEPHONE ENCOUNTER
Patient was started on Vit D a few months ago. Repeat Vit D is lower now? ?  Was 25, is 21 now. Has she been taking it? If yes,   Ergocalciferol  50,000 units q week x 8 week, followed by q month. Repeat 25 OH Vit D in three months.     Thanks

## 2019-05-29 NOTE — PROGRESS NOTES
[facility], [facility]  Nephrology Daily Progress Note    Jacquie Matute  [id number]  66year old  Patient presents with:  Surgical/procedure Clearance      HPI:   Jacquie Matute is a 66year old female.   81-year-old female with a history of hypertension, hyperch Height - 59.000 59.000   BODY MASS INDEX 51.5 - 50.78       VITALS: WEIGHT ONLY 5/13/2019 5/20/2019 5/28/2019   Weight 255 lbs 255 lbs 251 lbs 6 oz       Constitutional: appears well hydrated alert and responsive no acute distress noted  Neck/Thyroid: ne 24 Hr, Take 1 tablet (25 mg total) by mouth once daily. , Disp: 90 tablet, Rfl: 1  •  Lisinopril-hydroCHLOROthiazide 20-25 MG Oral Tab, Take 1 tablet by mouth daily. , Disp: 90 tablet, Rfl: 1  •  Dulaglutide (TRULICITY) 1.5 FK/0.5AY Subcutaneous Solution Pen sugar 1 time daily, Disp: 1 kit, Rfl: 0  •  Glucose Blood (MORENO CONTOUR NEXT TEST) In Vitro Strip, Check blood sugar 1 time daily, Disp: 100 strip, Rfl: 1  •  MORENO MICROLET LANCETS Does not apply Misc, Use 1 lancet daily to check blood sugar, Disp: 100 e [E]      Iron And Tibc      CBC W Differential W Platelet      Comp Metabolic Panel (14)      Lipid Panel      Microalb/Creat Ratio, Random Urine      EKG 12 Lead to be performed at [facility]      5/28/2019  Ata Sanford MD

## 2019-05-30 ENCOUNTER — TELEPHONE (OUTPATIENT)
Dept: NEPHROLOGY | Facility: CLINIC | Age: 64
End: 2019-05-30

## 2019-05-30 DIAGNOSIS — E78.00 PURE HYPERCHOLESTEROLEMIA: Primary | ICD-10-CM

## 2019-05-30 RX ORDER — ERGOCALCIFEROL (VITAMIN D2) 1250 MCG
CAPSULE ORAL
Qty: 12 CAPSULE | Refills: 0 | Status: SHIPPED | OUTPATIENT
Start: 2019-05-30 | End: 2019-08-05

## 2019-05-30 NOTE — TELEPHONE ENCOUNTER
Spoke with Jose Guadalupe Hurtado. She states that she stopped ergocalciferol about 1 month ago and wasn't aware she needed to continue it. She verbalizes understanding of re-starting 50,000 units q week x 8 weeks, then once per month, and repeating Vit D level in 3 months.

## 2019-05-31 NOTE — TELEPHONE ENCOUNTER
Patient contacted. Results message from Dr. Zulema Gonzalez read to patient. She is aware she has been cleared for surgery. She states she had stopped taking all her medications including Crestor but will start taking it again. Labs in 6 weeks?

## 2019-06-03 ENCOUNTER — ANESTHESIA (OUTPATIENT)
Dept: SURGERY | Facility: HOSPITAL | Age: 64
End: 2019-06-03
Payer: COMMERCIAL

## 2019-06-03 ENCOUNTER — HOSPITAL ENCOUNTER (OUTPATIENT)
Facility: HOSPITAL | Age: 64
Setting detail: HOSPITAL OUTPATIENT SURGERY
Discharge: HOME | End: 2019-06-03
Attending: OBSTETRICS & GYNECOLOGY | Admitting: OBSTETRICS & GYNECOLOGY
Payer: COMMERCIAL

## 2019-06-03 ENCOUNTER — ANESTHESIA EVENT (OUTPATIENT)
Dept: SURGERY | Facility: HOSPITAL | Age: 64
End: 2019-06-03
Payer: COMMERCIAL

## 2019-06-03 VITALS
TEMPERATURE: 99 F | HEIGHT: 59 IN | WEIGHT: 248 LBS | HEART RATE: 70 BPM | RESPIRATION RATE: 18 BRPM | BODY MASS INDEX: 50 KG/M2 | DIASTOLIC BLOOD PRESSURE: 68 MMHG | SYSTOLIC BLOOD PRESSURE: 126 MMHG | OXYGEN SATURATION: 96 %

## 2019-06-03 PROCEDURE — 0UDB8ZX EXTRACTION OF ENDOMETRIUM, VIA NATURAL OR ARTIFICIAL OPENING ENDOSCOPIC, DIAGNOSTIC: ICD-10-PCS | Performed by: OBSTETRICS & GYNECOLOGY

## 2019-06-03 PROCEDURE — 0UB98ZX EXCISION OF UTERUS, VIA NATURAL OR ARTIFICIAL OPENING ENDOSCOPIC, DIAGNOSTIC: ICD-10-PCS | Performed by: OBSTETRICS & GYNECOLOGY

## 2019-06-03 PROCEDURE — 82962 GLUCOSE BLOOD TEST: CPT

## 2019-06-03 PROCEDURE — 88305 TISSUE EXAM BY PATHOLOGIST: CPT | Performed by: OBSTETRICS & GYNECOLOGY

## 2019-06-03 PROCEDURE — 0UBMXZX EXCISION OF VULVA, EXTERNAL APPROACH, DIAGNOSTIC: ICD-10-PCS | Performed by: OBSTETRICS & GYNECOLOGY

## 2019-06-03 RX ORDER — ONDANSETRON 2 MG/ML
4 INJECTION INTRAMUSCULAR; INTRAVENOUS ONCE AS NEEDED
Status: DISCONTINUED | OUTPATIENT
Start: 2019-06-03 | End: 2019-06-03

## 2019-06-03 RX ORDER — SODIUM CHLORIDE, SODIUM LACTATE, POTASSIUM CHLORIDE, CALCIUM CHLORIDE 600; 310; 30; 20 MG/100ML; MG/100ML; MG/100ML; MG/100ML
INJECTION, SOLUTION INTRAVENOUS CONTINUOUS
Status: DISCONTINUED | OUTPATIENT
Start: 2019-06-03 | End: 2019-06-03

## 2019-06-03 RX ORDER — NALOXONE HYDROCHLORIDE 0.4 MG/ML
80 INJECTION, SOLUTION INTRAMUSCULAR; INTRAVENOUS; SUBCUTANEOUS AS NEEDED
Status: DISCONTINUED | OUTPATIENT
Start: 2019-06-03 | End: 2019-06-03

## 2019-06-03 RX ORDER — HYDROCODONE BITARTRATE AND ACETAMINOPHEN 5; 325 MG/1; MG/1
2 TABLET ORAL AS NEEDED
Status: DISCONTINUED | OUTPATIENT
Start: 2019-06-03 | End: 2019-06-03

## 2019-06-03 RX ORDER — HYDROMORPHONE HYDROCHLORIDE 1 MG/ML
0.4 INJECTION, SOLUTION INTRAMUSCULAR; INTRAVENOUS; SUBCUTANEOUS EVERY 5 MIN PRN
Status: DISCONTINUED | OUTPATIENT
Start: 2019-06-03 | End: 2019-06-03

## 2019-06-03 RX ORDER — DEXAMETHASONE SODIUM PHOSPHATE 4 MG/ML
VIAL (ML) INJECTION AS NEEDED
Status: DISCONTINUED | OUTPATIENT
Start: 2019-06-03 | End: 2019-06-03 | Stop reason: SURG

## 2019-06-03 RX ORDER — LIDOCAINE HYDROCHLORIDE 10 MG/ML
INJECTION, SOLUTION EPIDURAL; INFILTRATION; INTRACAUDAL; PERINEURAL AS NEEDED
Status: DISCONTINUED | OUTPATIENT
Start: 2019-06-03 | End: 2019-06-03 | Stop reason: SURG

## 2019-06-03 RX ORDER — FAMOTIDINE 20 MG/1
20 TABLET ORAL ONCE
Status: COMPLETED | OUTPATIENT
Start: 2019-06-03 | End: 2019-06-03

## 2019-06-03 RX ORDER — METOPROLOL TARTRATE 5 MG/5ML
2.5 INJECTION INTRAVENOUS ONCE
Status: DISCONTINUED | OUTPATIENT
Start: 2019-06-03 | End: 2019-06-03

## 2019-06-03 RX ORDER — DEXTROSE MONOHYDRATE 25 G/50ML
50 INJECTION, SOLUTION INTRAVENOUS
Status: DISCONTINUED | OUTPATIENT
Start: 2019-06-03 | End: 2019-06-03

## 2019-06-03 RX ORDER — MIDAZOLAM HYDROCHLORIDE 1 MG/ML
INJECTION INTRAMUSCULAR; INTRAVENOUS AS NEEDED
Status: DISCONTINUED | OUTPATIENT
Start: 2019-06-03 | End: 2019-06-03 | Stop reason: SURG

## 2019-06-03 RX ORDER — MORPHINE SULFATE 10 MG/ML
6 INJECTION, SOLUTION INTRAMUSCULAR; INTRAVENOUS EVERY 10 MIN PRN
Status: DISCONTINUED | OUTPATIENT
Start: 2019-06-03 | End: 2019-06-03

## 2019-06-03 RX ORDER — MORPHINE SULFATE 4 MG/ML
4 INJECTION, SOLUTION INTRAMUSCULAR; INTRAVENOUS EVERY 10 MIN PRN
Status: DISCONTINUED | OUTPATIENT
Start: 2019-06-03 | End: 2019-06-03

## 2019-06-03 RX ORDER — ACETAMINOPHEN 500 MG
1000 TABLET ORAL ONCE
Status: COMPLETED | OUTPATIENT
Start: 2019-06-03 | End: 2019-06-03

## 2019-06-03 RX ORDER — HALOPERIDOL 5 MG/ML
0.25 INJECTION INTRAMUSCULAR ONCE AS NEEDED
Status: DISCONTINUED | OUTPATIENT
Start: 2019-06-03 | End: 2019-06-03

## 2019-06-03 RX ORDER — PROCHLORPERAZINE EDISYLATE 5 MG/ML
5 INJECTION INTRAMUSCULAR; INTRAVENOUS ONCE AS NEEDED
Status: DISCONTINUED | OUTPATIENT
Start: 2019-06-03 | End: 2019-06-03

## 2019-06-03 RX ORDER — ONDANSETRON 2 MG/ML
INJECTION INTRAMUSCULAR; INTRAVENOUS AS NEEDED
Status: DISCONTINUED | OUTPATIENT
Start: 2019-06-03 | End: 2019-06-03 | Stop reason: SURG

## 2019-06-03 RX ORDER — ONDANSETRON 2 MG/ML
4 INJECTION INTRAMUSCULAR; INTRAVENOUS EVERY 8 HOURS PRN
Status: CANCELLED | OUTPATIENT
Start: 2019-06-03

## 2019-06-03 RX ORDER — METOCLOPRAMIDE 10 MG/1
10 TABLET ORAL ONCE
Status: COMPLETED | OUTPATIENT
Start: 2019-06-03 | End: 2019-06-03

## 2019-06-03 RX ORDER — HYDROMORPHONE HYDROCHLORIDE 1 MG/ML
0.2 INJECTION, SOLUTION INTRAMUSCULAR; INTRAVENOUS; SUBCUTANEOUS EVERY 5 MIN PRN
Status: DISCONTINUED | OUTPATIENT
Start: 2019-06-03 | End: 2019-06-03

## 2019-06-03 RX ORDER — MORPHINE SULFATE 2 MG/ML
2 INJECTION, SOLUTION INTRAMUSCULAR; INTRAVENOUS EVERY 10 MIN PRN
Status: DISCONTINUED | OUTPATIENT
Start: 2019-06-03 | End: 2019-06-03

## 2019-06-03 RX ORDER — ONDANSETRON 4 MG/1
4 TABLET, FILM COATED ORAL EVERY 8 HOURS PRN
Status: CANCELLED | OUTPATIENT
Start: 2019-06-03

## 2019-06-03 RX ORDER — HYDROCODONE BITARTRATE AND ACETAMINOPHEN 5; 325 MG/1; MG/1
1 TABLET ORAL AS NEEDED
Status: DISCONTINUED | OUTPATIENT
Start: 2019-06-03 | End: 2019-06-03

## 2019-06-03 RX ORDER — PHENYLEPHRINE HCL 10 MG/ML
VIAL (ML) INJECTION AS NEEDED
Status: DISCONTINUED | OUTPATIENT
Start: 2019-06-03 | End: 2019-06-03 | Stop reason: SURG

## 2019-06-03 RX ORDER — KETOROLAC TROMETHAMINE 30 MG/ML
INJECTION, SOLUTION INTRAMUSCULAR; INTRAVENOUS AS NEEDED
Status: DISCONTINUED | OUTPATIENT
Start: 2019-06-03 | End: 2019-06-03 | Stop reason: SURG

## 2019-06-03 RX ORDER — ESTRADIOL 0.1 MG/G
CREAM VAGINAL
Qty: 1 TUBE | Refills: 0 | Status: SHIPPED | OUTPATIENT
Start: 2019-06-03 | End: 2020-03-05 | Stop reason: ALTCHOICE

## 2019-06-03 RX ORDER — HYDROMORPHONE HYDROCHLORIDE 1 MG/ML
0.6 INJECTION, SOLUTION INTRAMUSCULAR; INTRAVENOUS; SUBCUTANEOUS EVERY 5 MIN PRN
Status: DISCONTINUED | OUTPATIENT
Start: 2019-06-03 | End: 2019-06-03

## 2019-06-03 RX ADMIN — LIDOCAINE HYDROCHLORIDE 50 MG: 10 INJECTION, SOLUTION EPIDURAL; INFILTRATION; INTRACAUDAL; PERINEURAL at 12:36:00

## 2019-06-03 RX ADMIN — KETOROLAC TROMETHAMINE 30 MG: 30 INJECTION, SOLUTION INTRAMUSCULAR; INTRAVENOUS at 13:11:00

## 2019-06-03 RX ADMIN — ONDANSETRON 4 MG: 2 INJECTION INTRAMUSCULAR; INTRAVENOUS at 13:10:00

## 2019-06-03 RX ADMIN — MIDAZOLAM HYDROCHLORIDE 2 MG: 1 INJECTION INTRAMUSCULAR; INTRAVENOUS at 12:35:00

## 2019-06-03 RX ADMIN — DEXAMETHASONE SODIUM PHOSPHATE 4 MG: 4 MG/ML VIAL (ML) INJECTION at 12:43:00

## 2019-06-03 RX ADMIN — PHENYLEPHRINE HCL 50 MCG: 10 MG/ML VIAL (ML) INJECTION at 12:55:00

## 2019-06-03 RX ADMIN — SODIUM CHLORIDE, SODIUM LACTATE, POTASSIUM CHLORIDE, CALCIUM CHLORIDE: 600; 310; 30; 20 INJECTION, SOLUTION INTRAVENOUS at 13:30:00

## 2019-06-03 NOTE — H&P
Pre-Operative History and Physical    Diagnosis: endometrial polyp, postmenopausal bleeding    Planned Procedure: hysteroscopy, D&C, polypectomy    HPI:  Alex Salmon is a 66year old  No LMP recorded (lmp unknown).  Patient is postmenopausal. who Quit date: 2000        Years since quittin.8      Smokeless tobacco: Never Used    Substance and Sexual Activity      Alcohol use: Yes        Comment: social - monthly      Drug use: No      Sexual activity: Yes        Partners: Male        Comme shortness of breath  Gastrointestinal: negative; denies heartburn, abdominal pain, diarrhea or constiptation  Genitourinary: negative; denies dysuria, incontinence, vaginal itching or discharge. Musculoskeletal: negative; denies back pain.   Skin/Breast:

## 2019-06-03 NOTE — ANESTHESIA PROCEDURE NOTES
Airway  Date/Time: 6/3/2019 12:40 PM  Urgency: elective      General Information and Staff    Patient location during procedure: OR  Anesthesiologist: Melissa García MD  Resident/CRNA: Jess Verma CRNA  Performed: CRNA     Indications and Patient C

## 2019-06-03 NOTE — DISCHARGE SUMMARY
[facility] HOSP - [facility]    Discharge Summary    Tanya Grave Patient Status:  Hospital Outpatient Surgery    10/24/1955 MRN [id number]   Location [address] UNIT Attending Humble Gregory MD   Hosp Day # 0 PCP David Smith total) by mouth daily. triamcinolone acetonide 0.5 % External Cream  Use bid to affected areas of skin    Fluticasone Propionate 50 MCG/ACT Nasal Suspension  2 sprays by Each Nare route daily.     Ketoconazole 2 % External Shampoo  Apply to scalp 3 times the lawn, playing sports, or working with power tools/applicances (power saws, electric knives or mixers)   · That you have someone stay with you on your first night home   · Do not drink alcohol or take sleeping pills or tranquilizers   · Do not sign lega

## 2019-06-03 NOTE — ANESTHESIA PREPROCEDURE EVALUATION
Anesthesia PreOp Note    HPI:     Elver Barroso is a 66year old female who presents for preoperative consultation requested by: Keysha Vega MD    Date of Surgery: 6/3/2019    Procedure(s):   MYOMECTOMY HYSTEROSCOPIC  Indication: endometrial polyp, po bilateral         Date Noted: 09/29/2014      Hypothyroid         Date Noted: 09/16/2014      HTN (hypertension)         Date Noted: 09/16/2014      Pure hypercholesterolemia         Date Noted: 09/16/2014      Obesity         Date Noted: 08/01/2014      H (25 mg total) by mouth once daily. Disp: 90 tablet Rfl: 1 6/3/2019 at 0800   Lisinopril-hydroCHLOROthiazide 20-25 MG Oral Tab Take 1 tablet by mouth daily.  Disp: 90 tablet Rfl: 1 6/1/2019 at Unknown time   metFORMIN HCl 500 MG Oral Tab Take 1 tablet (500 m 1 More than a month at Unknown time   Insulin Pen Needle (NOVOFINE) 32G X 6 MM Does not apply Misc Use one needle with each use Disp: 1 Box Rfl: 3 Not Taking   Blood Glucose Monitoring Suppl (Mobvoi CONTOUR NEXT MONITOR) w/Device Does not apply Kit 1 each b Alcohol use: Yes        Comment: social - monthly      Drug use: No      Sexual activity: Yes        Partners: Male        Comment: none    Lifestyle      Physical activity:        Days per week: Not on file        Minutes per session: Not on file      S CO2 29.0 05/29/2019    BUN 17 05/29/2019    CREATSERUM 0.82 05/29/2019     (H) 05/29/2019    CA 9.1 05/29/2019          Vital Signs: Body mass index is 50.09 kg/m². height is 1.499 m (4' 11\") and weight is 112.5 kg (248 lb).  Her oral temperature

## 2019-06-03 NOTE — ANESTHESIA POSTPROCEDURE EVALUATION
Patient: Daniel Treviño    Procedure Summary     Date:  06/03/19 Room / Location:  [address] [place] [address] / [address] [place] [address] MAIN OR    Anesthesia Start:  7754 Anesthesia Stop:      Procedure:  [id number] HYSTEROSCOPIC (N/A ) Diagnosis:  (endometrial polyp, postmenopausal blee

## 2019-06-03 NOTE — BRIEF OP NOTE
Pre-Operative Diagnosis: endometrial polyp, postmenopausal bleeding     Post-Operative Diagnosis: endometrial polyp, postmenopausal bleeding      Procedure Performed:   Procedure(s):  hysteroscopy, dilation and curettage, polypectomy  Vulvar biopsy    Surg

## 2019-06-05 NOTE — PROGRESS NOTES
VM left for patient: benign polyp and endometrial curettings. Vulva with lichen sclerosis.   Plan to review at post-op visit

## 2019-06-06 NOTE — PROGRESS NOTES
Released to [address] [place] [address] [address]; patient viewed.   Future Appointments  7/1/2019   11:00 AM   [facility] SLEEP ROOMS            [facility] SLEEP      [facility]  7/5/2019   2:45 PM    MD NGHIA Mas  8/5/2019   12:00 PM   Sonia Saunders MD

## 2019-06-24 ENCOUNTER — OFFICE VISIT (OUTPATIENT)
Dept: INTERNAL MEDICINE CLINIC | Facility: CLINIC | Age: 64
End: 2019-06-24

## 2019-06-24 VITALS
SYSTOLIC BLOOD PRESSURE: 135 MMHG | HEIGHT: 59 IN | BODY MASS INDEX: 51.33 KG/M2 | DIASTOLIC BLOOD PRESSURE: 89 MMHG | HEART RATE: 86 BPM | WEIGHT: 254.63 LBS | TEMPERATURE: 98 F | RESPIRATION RATE: 20 BRPM

## 2019-06-24 DIAGNOSIS — J06.9 URI WITH COUGH AND CONGESTION: Primary | ICD-10-CM

## 2019-06-24 PROCEDURE — 99213 OFFICE O/P EST LOW 20 MIN: CPT | Performed by: PHYSICIAN ASSISTANT

## 2019-06-24 PROCEDURE — 99212 OFFICE O/P EST SF 10 MIN: CPT | Performed by: PHYSICIAN ASSISTANT

## 2019-06-24 RX ORDER — AZITHROMYCIN 250 MG/1
TABLET, FILM COATED ORAL
Qty: 6 TABLET | Refills: 0 | Status: SHIPPED | OUTPATIENT
Start: 2019-06-24 | End: 2019-07-02 | Stop reason: ALTCHOICE

## 2019-06-24 NOTE — PROGRESS NOTES
HPI:    Patient ID: Nabil Daniel is a 66year old female. HPI     Patient presents complaining of upper respiratory symptoms and a sore throat that starts Friday and is getting worse. States that now she has difficulty swallowing.   Denies any fevers, skin every 7 days.  ) Disp: 12 pen Rfl: 0   metFORMIN HCl 500 MG Oral Tab Take 1 tablet (500 mg total) by mouth 2 (two) times daily with meals.  Disp: 60 tablet Rfl: 2   LEVOTHYROXINE SODIUM 175 MCG Oral Tab TAKE 1 TABLET(175 MCG) BY MOUTH BEFORE BREAKFAST BREAST   • Abnormal uterine bleeding    • Allergic conjunctivitis of both eyes 9/29/2014   • Back problem    • Blepharitis 9/29/2014   • COPD (chronic obstructive pulmonary disease) ([facility])    • Diabetes ([facility])    • Disorder of thyroid     hypothyroid   • DM2 Tympanic membrane normal.   Left Ear: Tympanic membrane normal.   Nose: Nose normal.   Mouth/Throat: Posterior oropharyngeal erythema present. Eyes: Pupils are equal, round, and reactive to light.  Conjunctivae and EOM are normal.   Neck: Normal range of

## 2019-06-27 NOTE — PROGRESS NOTES
[address] BARIATRIC AND WEIGHT LOSS CLINIC  [address] [place] [address]  Dept: [phone]     Date:   2016    Patient:  Dustin Bell  :      10/24/1955  MRN:      [id number]    Chief Complaint:  Patient presents with: METOPROLOL SUCCINATE ER 25 MG Oral Tablet 24 Hr TAKE 1 TABLET BY MOUTH DAILY Disp: 30 tablet Rfl: 0   LEVOTHYROXINE SODIUM 150 MCG Oral Tab TAKE 1 TABLET BY MOUTH DAILY BEFORE BREAKFAST.  Disp: 90 tablet Rfl: 0   LOSARTAN POTASSIUM-HCTZ 100-25 MG Oral Tab History   Problem Relation Age of Onset   • Hypertension Father    • Heart Disease Father    • Diabetes Neg    • Glaucoma Neg    • Retinal detachment Neg    • Macular degeneration Neg    • Cancer Neg        Food Journal  · Reviewed and Discussed:       · P rhythm  Abdomen: soft, non-tender; bowel sounds normal; no masses,  no organomegaly  Extremities: extremities normal, atraumatic, no cyanosis or edema  Pulses: 2+ and symmetric  Skin: Skin color, texture, turgor normal. No rashes or lesions    ASSESSMENT above medications. No interval change in antihypertensive medication. Patient was instructed to continue wearing their CPaP as recommended. DYSLIPIDEMIA: Stable on the above prescribed meal plan and medication. Liver function stable.       Lab Resul Expected Date Of Service: 06/27/2019

## 2019-07-01 ENCOUNTER — OFFICE VISIT (OUTPATIENT)
Dept: SLEEP CENTER | Age: 64
End: 2019-07-01
Attending: INTERNAL MEDICINE
Payer: COMMERCIAL

## 2019-07-01 DIAGNOSIS — G47.30 SLEEP APNEA, UNSPECIFIED TYPE: ICD-10-CM

## 2019-07-01 DIAGNOSIS — G47.33 OSA (OBSTRUCTIVE SLEEP APNEA): Primary | ICD-10-CM

## 2019-07-01 PROCEDURE — 95806 SLEEP STUDY UNATT&RESP EFFT: CPT

## 2019-07-02 ENCOUNTER — OFFICE VISIT (OUTPATIENT)
Dept: INTERNAL MEDICINE CLINIC | Facility: CLINIC | Age: 64
End: 2019-07-02

## 2019-07-02 ENCOUNTER — HOSPITAL ENCOUNTER (OUTPATIENT)
Dept: GENERAL RADIOLOGY | Age: 64
Discharge: HOME | End: 2019-07-02
Attending: PHYSICIAN ASSISTANT
Payer: COMMERCIAL

## 2019-07-02 VITALS
WEIGHT: 255 LBS | SYSTOLIC BLOOD PRESSURE: 138 MMHG | BODY MASS INDEX: 51.41 KG/M2 | HEIGHT: 59 IN | HEART RATE: 105 BPM | DIASTOLIC BLOOD PRESSURE: 90 MMHG | TEMPERATURE: 99 F

## 2019-07-02 DIAGNOSIS — R05.8 COUGH PRESENT FOR GREATER THAN 3 WEEKS: Primary | ICD-10-CM

## 2019-07-02 DIAGNOSIS — R05.8 COUGH PRESENT FOR GREATER THAN 3 WEEKS: ICD-10-CM

## 2019-07-02 DIAGNOSIS — R79.89 LOW VITAMIN D LEVEL: ICD-10-CM

## 2019-07-02 PROCEDURE — 99213 OFFICE O/P EST LOW 20 MIN: CPT | Performed by: PHYSICIAN ASSISTANT

## 2019-07-02 PROCEDURE — 71046 X-RAY EXAM CHEST 2 VIEWS: CPT | Performed by: PHYSICIAN ASSISTANT

## 2019-07-02 RX ORDER — BENZONATATE 100 MG/1
100 CAPSULE ORAL 3 TIMES DAILY PRN
Qty: 30 CAPSULE | Refills: 0 | Status: SHIPPED | OUTPATIENT
Start: 2019-07-02 | End: 2019-07-22

## 2019-07-02 RX ORDER — ERGOCALCIFEROL 1.25 MG/1
CAPSULE ORAL
Qty: 10 CAPSULE | Refills: 0 | Status: SHIPPED | OUTPATIENT
Start: 2019-07-02 | End: 2019-08-12

## 2019-07-02 NOTE — PROGRESS NOTES
HPI:    Patient ID: Jeff Gaspar is a 66year old female. HPI   Patient presents for follow-up of upper respiratory infection. Continues to have cough and phlegm production. Denies any fevers, chills, body aches. Does note occasional wheezing.   Was Dulaglutide (TRULICITY) 1.5 CI/2.1FJ Subcutaneous Solution Pen-injector Inject 1.5 mg into the skin every 7 days.  (Patient taking differently: Inject 1.5 mg into the skin every 7 days.  ) Disp: 12 pen Rfl: 0   metFORMIN HCl 500 MG Oral Tab Take 1 tablet sugar Disp: 100 each Rfl: 1     Allergies:No Known Allergies  History:  Past Medical History:   Diagnosis Date   • Abnormal mammogram 2005    NEW MICROCALCIFICATION LEFT BREAST   • Abnormal uterine bleeding    • Allergic conjunctivitis of both eyes 9/29/20 person, place, and time. She appears well-developed and well-nourished. No distress. HENT:   Head: Normocephalic and atraumatic. Right Ear: External ear normal. A middle ear effusion is present.    Left Ear: External ear normal. A middle ear effusion is encounter. No follow-ups on file.     Meds This Visit:  Requested Prescriptions     Signed Prescriptions Disp Refills   • benzonatate (TESSALON PERLES) 100 MG Oral Cap 30 capsule 0     Sig: Take 1 capsule (100 mg total) by mouth 3 (three) times daily as

## 2019-07-03 NOTE — PROCEDURES
[facility]  Accredited by the [employer] (AASM)    PATIENT'S NAME: Haley West   ATTENDING PHYSICIAN: Analy Enciso MD   REFERRING PHYSICIAN: Analy Enciso MD   PATIENT ACCOUNT #: [de-identified] LOCATION: S position. RECOMMENDATIONS:    1. Consider CPAP titration in this symptomatic patient. 2.   Weight loss. 3.   Avoid alcohol. 4.   Avoid sedating drug. 5.   Patient should not drive if at all sleepy.     6.   Patient should avoid the supine positio

## 2019-07-07 ENCOUNTER — TELEPHONE (OUTPATIENT)
Dept: NEPHROLOGY | Facility: CLINIC | Age: 64
End: 2019-07-07

## 2019-07-07 DIAGNOSIS — R53.83 FATIGUE, UNSPECIFIED TYPE: Primary | ICD-10-CM

## 2019-07-07 NOTE — TELEPHONE ENCOUNTER
Sleep study shows mild sleep apnea which gets worse when she is lying on her back.   Given that she has chronic fatigue do CPAP titration trial.

## 2019-07-08 NOTE — TELEPHONE ENCOUNTER
Spoke with pt. She feels there is something else going undiagnosed. She slept on her back during the study due to being hooked up to the machinery. She does not normally sleep like that. Would like to defer titration study at this time.   Requesting ref

## 2019-07-09 NOTE — TELEPHONE ENCOUNTER
Called the patient to get more details why she is requesting a RHE referral. She states that she has general fatigue. She is tired all day long that makes it difficult to get through the day. She feels weak. Denies dizziness, falls.  States has been feeling

## 2019-07-09 NOTE — TELEPHONE ENCOUNTER
Pt returned call. Notified her that [facility] did put in referral for rheumatology but explained that her sleep study did show sleep apnea, which she is not being treated at this time. Advised to do CPAP titration trial if rheumatology cannot help her.  Pt agreed

## 2019-07-09 NOTE — TELEPHONE ENCOUNTER
Okay but remembers she also has sleep apnea which is not being treated.   If rheumatology cannot help her than she really needs to do the CPAP titration trial.

## 2019-07-24 ENCOUNTER — LAB ENCOUNTER (OUTPATIENT)
Dept: LAB | Facility: HOSPITAL | Age: 64
End: 2019-07-24
Attending: INTERNAL MEDICINE
Payer: COMMERCIAL

## 2019-07-24 ENCOUNTER — OFFICE VISIT (OUTPATIENT)
Dept: RHEUMATOLOGY | Facility: CLINIC | Age: 64
End: 2019-07-24

## 2019-07-24 VITALS
SYSTOLIC BLOOD PRESSURE: 149 MMHG | WEIGHT: 252.13 LBS | RESPIRATION RATE: 18 BRPM | BODY MASS INDEX: 50.83 KG/M2 | HEART RATE: 99 BPM | TEMPERATURE: 98 F | DIASTOLIC BLOOD PRESSURE: 85 MMHG | HEIGHT: 59 IN

## 2019-07-24 DIAGNOSIS — R53.83 FATIGUE, UNSPECIFIED TYPE: ICD-10-CM

## 2019-07-24 DIAGNOSIS — E78.00 PURE HYPERCHOLESTEROLEMIA: ICD-10-CM

## 2019-07-24 DIAGNOSIS — R53.83 FATIGUE, UNSPECIFIED TYPE: Primary | ICD-10-CM

## 2019-07-24 LAB
ALBUMIN SERPL-MCNC: 3.5 G/DL (ref 3.4–5)
ALP LIVER SERPL-CCNC: 74 U/L (ref 50–130)
ALT SERPL-CCNC: 24 U/L (ref 13–56)
AST SERPL-CCNC: 16 U/L (ref 15–37)
BILIRUB DIRECT SERPL-MCNC: 0.1 MG/DL (ref 0–0.2)
BILIRUB SERPL-MCNC: 0.8 MG/DL (ref 0.1–2)
CHOLEST SMN-MCNC: 231 MG/DL (ref ?–200)
HDLC SERPL-MCNC: 48 MG/DL (ref 40–59)
LDLC SERPL CALC-MCNC: 146 MG/DL (ref ?–100)
M PROTEIN MFR SERPL ELPH: 7.6 G/DL (ref 6.4–8.2)
NONHDLC SERPL-MCNC: 183 MG/DL (ref ?–130)
PATIENT FASTING: NO
TRIGL SERPL-MCNC: 185 MG/DL (ref 30–149)
VLDLC SERPL CALC-MCNC: 37 MG/DL (ref 0–30)

## 2019-07-24 PROCEDURE — 86039 ANTINUCLEAR ANTIBODIES (ANA): CPT

## 2019-07-24 PROCEDURE — 86235 NUCLEAR ANTIGEN ANTIBODY: CPT

## 2019-07-24 PROCEDURE — 80076 HEPATIC FUNCTION PANEL: CPT

## 2019-07-24 PROCEDURE — 80061 LIPID PANEL: CPT

## 2019-07-24 PROCEDURE — 99244 OFF/OP CNSLTJ NEW/EST MOD 40: CPT | Performed by: INTERNAL MEDICINE

## 2019-07-24 PROCEDURE — 86225 DNA ANTIBODY NATIVE: CPT

## 2019-07-24 PROCEDURE — 86038 ANTINUCLEAR ANTIBODIES: CPT

## 2019-07-24 PROCEDURE — 36415 COLL VENOUS BLD VENIPUNCTURE: CPT

## 2019-07-24 NOTE — PATIENT INSTRUCTIONS
You were seen today for fatigue  You do not have features of lupus but will get the blood work ROGELIO  Fatigue could be a number of causes-- vitamin d deficiency and sleep apnea, would recommend to start the CPAP

## 2019-07-24 NOTE — PROGRESS NOTES
Erlinda Syed is a 66year old female who presents for Patient presents with:  Consult  Fatigue: referred by Dr. Tim Mcbride d/t fatigue x 7 months. Mateusz Souza HPI:     I had the pleasure of seeing Erlinda Syed on 7/24/2019 for evaluation of fatigue.   Patient wa Rfl: 1   ergocalciferol 55832 units Oral Cap TAKE ONE CAPSULE BY MOUTH ONCE A WEEK FOR 8 WEEKS, THEN 1 CAPSULE ONCE A MONTH Disp: 10 capsule Rfl: 0   B Complex Oral Tab Take 1 tablet by mouth daily.  Disp:  Rfl:    Cyanocobalamin (VITAMIN B-12 OR) Take 1 ta times daily.  Disp: 60 g Rfl: 12   Fluocinonide 0.05 % External Solution Use bid to scalp Disp: 60 mL Rfl: 12   Insulin Pen Needle (BD PEN NEEDLE BETSY U/F) 32G X 4 MM Does not apply Misc Use a new needle with each use Disp: 1 Box Rfl: 2   Albuterol Sulfate COLONOSCOPY  11/7/2011    per NG   • HYSTEROSCOPY     • MYOMECTOMY HYSTEROSCOPIC N/A 6/3/2019    Performed by Kate Stewart MD at [address] [place] [address] MAIN OR      Family History   Problem Relation Age of Onset   • Hypertension Father    • Heart Disease Father    • Hear pulses. LUNGS: CTAB, no increased work of breathing  ABDOMEN:  soft NT/ND, +BS, no HSM  SKIN: No rashes or skin lesions.  No nail findings  MSK:  Cervical spine: FROM  Hands: no synovitis in DIP, PIP and MCP, strong full fists  Wrist: FROM, no pain or swe her fatiue    Thank you for allowing me to participate in this patients care.  Will notify pt if f/u is needed    Carlos Byrd MD  7/24/2019  3:09 PM

## 2019-07-25 ENCOUNTER — TELEPHONE (OUTPATIENT)
Dept: NEPHROLOGY | Facility: CLINIC | Age: 64
End: 2019-07-25

## 2019-07-25 DIAGNOSIS — E78.00 PURE HYPERCHOLESTEROLEMIA: Primary | ICD-10-CM

## 2019-07-25 LAB — NUCLEAR IGG TITR SER IF: POSITIVE {TITER}

## 2019-07-26 LAB
ANA NUCLEOLAR TITR SER IF: 80 {TITER}
DSDNA AB TITR SER: <10 {TITER}

## 2019-07-26 NOTE — TELEPHONE ENCOUNTER
Lab orders entered in Epic. Patient restarted Crestor. LMTCB re doing labs in 1 month. Fast 12 hours for lab work.

## 2019-07-26 NOTE — TELEPHONE ENCOUNTER
Patient returned Rn Call, Patient states that she did not fast prior to lab, and that she had misplaced medication for aprrox 20 days prior. Patient states found bottle lasat night and will resume Crestor tonight.

## 2019-07-29 ENCOUNTER — TELEPHONE (OUTPATIENT)
Dept: RHEUMATOLOGY | Facility: CLINIC | Age: 64
End: 2019-07-29

## 2019-07-29 RX ORDER — LEVOTHYROXINE SODIUM 175 UG/1
TABLET ORAL
Qty: 90 TABLET | Refills: 1 | Status: SHIPPED | OUTPATIENT
Start: 2019-07-29 | End: 2020-02-28 | Stop reason: DRUGHIGH

## 2019-07-29 NOTE — TELEPHONE ENCOUNTER
Pt calling to obtain clarification on her test results and based on this info she then will know if she needs to keep her upcoming APPT with provider on 08/07/19. Pt would be leaving country. Please Update Pt ASAP.

## 2019-07-30 LAB
ENA SM IGG SER QL: NEGATIVE
ENA SM+RNP AB SER QL: NEGATIVE
ENA SS-A AB SER QL IA: NEGATIVE
ENA SS-B AB SER QL IA: NEGATIVE

## 2019-07-30 NOTE — TELEPHONE ENCOUNTER
Please see below. Contacted pt and informed 2 lab results are still pending at this time. Pt verbalized understanding, will await call back once all test results are finalized.

## 2019-08-01 NOTE — TELEPHONE ENCOUNTER
LM on vm to resume Crestor and do labs in 1 month. Requested patient call to confirm that she got the message.

## 2019-08-05 ENCOUNTER — OFFICE VISIT (OUTPATIENT)
Dept: SURGERY | Facility: CLINIC | Age: 64
End: 2019-08-05

## 2019-08-05 VITALS
BODY MASS INDEX: 50.2 KG/M2 | SYSTOLIC BLOOD PRESSURE: 150 MMHG | HEIGHT: 59 IN | WEIGHT: 249 LBS | DIASTOLIC BLOOD PRESSURE: 80 MMHG

## 2019-08-05 DIAGNOSIS — F43.9 STRESS: ICD-10-CM

## 2019-08-05 DIAGNOSIS — G47.33 OSA (OBSTRUCTIVE SLEEP APNEA): ICD-10-CM

## 2019-08-05 DIAGNOSIS — E78.00 PURE HYPERCHOLESTEROLEMIA: Primary | ICD-10-CM

## 2019-08-05 DIAGNOSIS — I10 ESSENTIAL HYPERTENSION: ICD-10-CM

## 2019-08-05 DIAGNOSIS — Z51.81 ENCOUNTER FOR THERAPEUTIC DRUG MONITORING: ICD-10-CM

## 2019-08-05 DIAGNOSIS — E66.01 MORBID OBESITY WITH BMI OF 50.0-59.9, ADULT (HCC): ICD-10-CM

## 2019-08-05 PROCEDURE — 99214 OFFICE O/P EST MOD 30 MIN: CPT | Performed by: INTERNAL MEDICINE

## 2019-08-05 NOTE — PROGRESS NOTES
[address] BARIATRIC AND WEIGHT LOSS CLINIC  [address] [place] [address]  Dept: [phone]     Date:   18    Patient:  Emelia Baca  :      10/24/1955  MRN:      [id number]    Chief Complaint:  Patient presents with:   Demetrius Perez 9/4/2019] Lisdexamfetamine Dimesylate (VYVANSE) 70 MG Oral Cap Take 1 capsule (70 mg total) by mouth daily.  Disp: 30 capsule Rfl: 0   [START ON 10/4/2019] Lisdexamfetamine Dimesylate (VYVANSE) 70 MG Oral Cap Take 1 capsule (70 mg total) by mouth every morn mcg total) by mouth daily. Disp:  Rfl: 0   triamcinolone acetonide 0.5 % External Cream Use bid to affected areas of skin Disp: 30 g Rfl: 3   Fluticasone Propionate 50 MCG/ACT Nasal Suspension 2 sprays by Each Nare route daily.  Disp: 1 Bottle Rfl: 3   Keto 7.00        Pack years: 3.5        Types: Cigarettes        Quit date: 2000        Years since quittin.0      Smokeless tobacco: Never Used    Substance and Sexual Activity      Alcohol use: Yes        Comment: social - monthly      Drug use:  No MD BECKY at [facility] MAIN OR     Family History:    Family History   Problem Relation Age of Onset   • Hypertension Father    • Heart Disease Father    • Heart Disorder Mother    • Heart Disease Mother    • Diabetes Neg    • Glaucoma Neg    • Retinal detachment Ne atraumatic  Lungs: clear to auscultation bilaterally  Heart: S1, S2 normal, no murmur, click, rub or gallop, regular rate and rhythm  Abdomen: soft, non-tender; bowel sounds normal; no masses,  no organomegaly  Extremities: extremities normal, atraumatic, stable. Lab Results   Component Value Date/Time    CHOLEST 231 (H) 07/24/2019 03:59 PM     (H) 07/24/2019 03:59 PM    HDL 48 07/24/2019 03:59 PM    TRIG 185 (H) 07/24/2019 03:59 PM    VLDL 37 (H) 07/24/2019 03:59 PM     Goals for next month:  1.

## 2019-08-12 ENCOUNTER — TELEPHONE (OUTPATIENT)
Dept: NEPHROLOGY | Facility: CLINIC | Age: 64
End: 2019-08-12

## 2019-08-12 DIAGNOSIS — G47.33 OSA ON CPAP: Primary | ICD-10-CM

## 2019-08-12 DIAGNOSIS — R79.89 LOW VITAMIN D LEVEL: ICD-10-CM

## 2019-08-12 DIAGNOSIS — Z99.89 OSA ON CPAP: Primary | ICD-10-CM

## 2019-08-12 RX ORDER — ERGOCALCIFEROL 1.25 MG/1
CAPSULE ORAL
Qty: 12 CAPSULE | Refills: 0 | Status: SHIPPED | OUTPATIENT
Start: 2019-08-12 | End: 2020-02-28

## 2019-08-12 NOTE — TELEPHONE ENCOUNTER
Pt requesting refills for Vitamin D - pt does have appt to see [facility] on 9/16/2019. Pls call. Thank you.       Current Outpatient Medications:  ergocalciferol 16509 units Oral Cap TAKE ONE CAPSULE BY MOUTH ONCE A WEEK FOR 8 WEEKS, THEN 1 CAPSULE ONCE A MONTH

## 2019-08-12 NOTE — TELEPHONE ENCOUNTER
Order faxed to [facility]. Sleep study was completed on 7/3/19. Recommendation is for a CPAP titration study but cannot find if it was done. No record in Epic. Please advise.  Cannot order supplies without CPAP settings

## 2019-08-14 NOTE — TELEPHONE ENCOUNTER
Called E to see if they got the orders and if any further documentation is needed. Spoke with Cheryl. She states order received on 8/12/19. However they only received the first 3 pages of the fax.  Requesting order, facesheet, and gen

## 2019-08-15 DIAGNOSIS — E66.01 MORBID OBESITY WITH BMI OF 50.0-59.9, ADULT (HCC): Primary | ICD-10-CM

## 2019-09-16 ENCOUNTER — OFFICE VISIT (OUTPATIENT)
Dept: NEPHROLOGY | Facility: CLINIC | Age: 64
End: 2019-09-16

## 2019-09-16 VITALS
HEIGHT: 59 IN | BODY MASS INDEX: 50.44 KG/M2 | HEART RATE: 91 BPM | WEIGHT: 250.19 LBS | SYSTOLIC BLOOD PRESSURE: 130 MMHG | DIASTOLIC BLOOD PRESSURE: 76 MMHG

## 2019-09-16 DIAGNOSIS — G47.30 SLEEP APNEA, UNSPECIFIED TYPE: Primary | ICD-10-CM

## 2019-09-16 PROCEDURE — 99214 OFFICE O/P EST MOD 30 MIN: CPT | Performed by: INTERNAL MEDICINE

## 2019-09-16 RX ORDER — PANTOPRAZOLE SODIUM 40 MG/1
40 TABLET, DELAYED RELEASE ORAL
Qty: 30 TABLET | Refills: 2 | Status: SHIPPED | OUTPATIENT
Start: 2019-09-16 | End: 2019-12-13

## 2019-09-16 NOTE — PROGRESS NOTES
[facility]3], [facility]  Nephrology Daily Progress Note    Oralia Alfonso  [id number]  66year old  Patient presents with:  Fatigue: follow up      HPI:   Oralia Alfonso is a 66year old female.   68-year-old female with a history of hypertension, hypercholesterole 59.000   BODY MASS INDEX 50.29 - 50.53       VITALS: WEIGHT ONLY 7/24/2019 8/5/2019 9/16/2019   Weight 252 lbs 2 oz 249 lbs 250 lbs 3 oz       Constitutional: appears well hydrated alert and responsive no acute distress noted  Neck/Thyroid: neck is supple Lisdexamfetamine Dimesylate (VYVANSE) 70 MG Oral Cap, Take 1 capsule (70 mg total) by mouth every morning., Disp: 30 capsule, Rfl: 0  •  LEVOTHYROXINE SODIUM 175 MCG Oral Tab, TAKE 1 TABLET(175 MCG) BY MOUTH BEFORE BREAKFAST, Disp: 90 tablet, Rfl: 1  •  Cl use, Disp: 1 Box, Rfl: 3  •  Blood Glucose Monitoring Suppl (MORENO CONTOUR NEXT MONITOR) w/Device Does not apply Kit, 1 each by In Vitro route daily.  Check blood sugar 1 time daily, Disp: 1 kit, Rfl: 0  •  Glucose Blood (MORENO CONTOUR NEXT TEST) In Vitro S seem to get much worse in the supine position. Therefore believe it is worthwhile pursuing a CPAP titration trial.  She does not think she can tolerate the full mask but they will try nasal CPAP. Will call with results and arrange for her equipment.   Oth

## 2019-09-16 NOTE — PATIENT INSTRUCTIONS
Do the CPAP titration trial as ordered. Try using Protonix for your heartburn. Let me know if it is not helping.

## 2019-09-25 ENCOUNTER — TELEPHONE (OUTPATIENT)
Dept: NEPHROLOGY | Facility: CLINIC | Age: 64
End: 2019-09-25

## 2019-09-25 DIAGNOSIS — G47.33 OSA ON CPAP: Primary | ICD-10-CM

## 2019-09-25 DIAGNOSIS — Z99.89 OSA ON CPAP: Primary | ICD-10-CM

## 2019-09-25 NOTE — TELEPHONE ENCOUNTER
Pt. States that she needs to get her cpap supplies and nasal pillow mask. Pt. States that she wants nasal mask, and that she is not able to tolerate the full face mask. Pt. Requesting for RN to send order to E.

## 2019-10-08 NOTE — TELEPHONE ENCOUNTER
The sleep study that was done on 7/1/19 was a general sleep study (Home test)  with recommendation to do CPAP Titration study in order to get settings. Order was placed on 9/16/19 but has not been done yet.  Dr. Usha Wadsworth cannot advise on CPAP settings withou

## 2019-10-08 NOTE — TELEPHONE ENCOUNTER
Spoke to [facility] and was instructed to send a new order requesting a mask refit. Fax was sent to [facility].  They will contact patient to set up this request.

## 2019-10-08 NOTE — TELEPHONE ENCOUNTER
Kenn Quigley states patient doesn't have a cpap machine and is requesting orders from Dr Deonte Casas to set patient up with a new Cpap machine. Please fax orders to [phone] attn: Cpap Intake. For additional questions please call. Thank you.

## 2019-10-09 NOTE — TELEPHONE ENCOUNTER
LMTCB-- [facility] cannot process request for supplies and nasal pillow without settings and patient told them she did not have a CPAP machine. Dr. Charity Nuñez wants patient to do CPAP titration study ordered 7/2019.

## 2019-10-21 RX ORDER — METOPROLOL SUCCINATE 25 MG/1
TABLET, EXTENDED RELEASE ORAL
Qty: 90 TABLET | Refills: 1 | Status: SHIPPED | OUTPATIENT
Start: 2019-10-21 | End: 2020-08-03

## 2019-10-21 RX ORDER — LISINOPRIL AND HYDROCHLOROTHIAZIDE 25; 20 MG/1; MG/1
1 TABLET ORAL DAILY
Qty: 90 TABLET | Refills: 1 | Status: SHIPPED | OUTPATIENT
Start: 2019-10-21 | End: 2020-08-03

## 2019-10-25 ENCOUNTER — TELEPHONE (OUTPATIENT)
Dept: NEPHROLOGY | Facility: CLINIC | Age: 64
End: 2019-10-25

## 2019-10-25 DIAGNOSIS — E66.9 OBESITY, UNSPECIFIED CLASSIFICATION, UNSPECIFIED OBESITY TYPE, UNSPECIFIED WHETHER SERIOUS COMORBIDITY PRESENT: Primary | ICD-10-CM

## 2019-10-25 NOTE — TELEPHONE ENCOUNTER
Patient requesting To see Dr Kandice Conley on Monday, 10/28/2019 for follow up appointment. Please call. Thank you.

## 2019-10-28 ENCOUNTER — OFFICE VISIT (OUTPATIENT)
Dept: SURGERY | Facility: CLINIC | Age: 64
End: 2019-10-28

## 2019-10-28 VITALS
HEIGHT: 59 IN | BODY MASS INDEX: 51.81 KG/M2 | DIASTOLIC BLOOD PRESSURE: 80 MMHG | WEIGHT: 257 LBS | SYSTOLIC BLOOD PRESSURE: 138 MMHG

## 2019-10-28 DIAGNOSIS — E78.00 HYPERCHOLESTEROLEMIA: ICD-10-CM

## 2019-10-28 DIAGNOSIS — R63.2 BINGE EATING: ICD-10-CM

## 2019-10-28 DIAGNOSIS — E11.9 TYPE 2 DIABETES MELLITUS WITHOUT RETINOPATHY (HCC): Primary | ICD-10-CM

## 2019-10-28 DIAGNOSIS — R63.2 INCREASED APPETITE: ICD-10-CM

## 2019-10-28 DIAGNOSIS — E66.01 MORBID OBESITY WITH BMI OF 50.0-59.9, ADULT (HCC): ICD-10-CM

## 2019-10-28 DIAGNOSIS — Z51.81 ENCOUNTER FOR THERAPEUTIC DRUG MONITORING: ICD-10-CM

## 2019-10-28 DIAGNOSIS — I10 ESSENTIAL HYPERTENSION: ICD-10-CM

## 2019-10-28 PROCEDURE — 99214 OFFICE O/P EST MOD 30 MIN: CPT | Performed by: INTERNAL MEDICINE

## 2019-10-28 RX ORDER — LIRAGLUTIDE 6 MG/ML
INJECTION, SOLUTION SUBCUTANEOUS
Refills: 2 | COMMUNITY
Start: 2019-10-20 | End: 2019-10-28 | Stop reason: ALTCHOICE

## 2019-10-28 RX ORDER — PHENTERMINE HYDROCHLORIDE 15 MG/1
15 CAPSULE ORAL EVERY MORNING
Qty: 30 CAPSULE | Refills: 2 | Status: SHIPPED | OUTPATIENT
Start: 2019-10-28 | End: 2020-01-13

## 2019-10-28 NOTE — PROGRESS NOTES
[facility] BARIATRIC AND WEIGHT LOSS CLINIC  [address] [address]  Dept: [phone]     Date:   18    Patient:  Candido Goss  :      10/24/1955  MRN:      [id number]    Chief Complaint:  Patient presents with:   Ignacio Resendiz 2  LISINOPRIL-HYDROCHLOROTHIAZIDE 20-25 MG Oral Tab, TAKE 1 TABLET BY MOUTH DAILY, Disp: 90 tablet, Rfl: 1  METOPROLOL SUCCINATE ER 25 MG Oral Tablet 24 Hr, TAKE 1 TABLET(25 MG) BY MOUTH EVERY DAY, Disp: 90 tablet, Rfl: 1  Pantoprazole Sodium 40 MG Oral Ta needle with each use, Disp: 1 Box, Rfl: 2  Albuterol Sulfate  (90 Base) MCG/ACT Inhalation Aero Soln, Inhale 2 puffs into the lungs 4 (four) times daily as needed for Wheezing., Disp: 1 Inhaler, Rfl: 1  Insulin Pen Needle (NOVOFINE) 32G X 6 MM Does Attends Faith service: Not on file        Active member of club or organization: Not on file        Attends meetings of clubs or organizations: Not on file        Relationship status: Not on file      Intimate partner violence:        Fear of current o Habits  · Patient states the following:  · Eats 3 meal(s) per day  · Length of time it takes to consume a meal:  20  · # of snacks per day: 1 Type of snacks:   sweets  · Amount of soda consumption per day:  diet  · Amount of water (in ounces) per day:  jarocho states that her cholesterol has been well controlled on her current medication.     Lab Results   Component Value Date/Time    CHOLEST 231 (H) 07/24/2019 03:59 PM     (H) 07/24/2019 03:59 PM    HDL 48 07/24/2019 03:59 PM    TRIG 185 (H) 07/24/2019 03 meals and snacks in advance    Binge eating: Tolerating Vyvanse 70mg- denies CP, SOB, palpitations.       Continue trulicity   Follow up with endo team    Will add phentermine at noon    PHENTERMINE: Since the patient would like to try phentermine, and is a

## 2019-10-30 ENCOUNTER — TELEPHONE (OUTPATIENT)
Dept: NEPHROLOGY | Facility: CLINIC | Age: 64
End: 2019-10-30

## 2019-11-16 ENCOUNTER — OFFICE VISIT (OUTPATIENT)
Dept: SLEEP CENTER | Age: 64
End: 2019-11-16
Attending: INTERNAL MEDICINE
Payer: COMMERCIAL

## 2019-11-16 DIAGNOSIS — G47.33 OSA (OBSTRUCTIVE SLEEP APNEA): Primary | ICD-10-CM

## 2019-11-16 PROCEDURE — 95811 POLYSOM 6/>YRS CPAP 4/> PARM: CPT

## 2019-11-18 ENCOUNTER — TELEPHONE (OUTPATIENT)
Dept: NEPHROLOGY | Facility: CLINIC | Age: 64
End: 2019-11-18

## 2019-11-18 DIAGNOSIS — G47.33 OSA (OBSTRUCTIVE SLEEP APNEA): Primary | ICD-10-CM

## 2019-11-18 NOTE — PROCEDURES
[facility]  Accredited by the [employer] (AASM)    PATIENT'S NAME: Jason Angela   ATTENDING PHYSICIAN: Cynthia Bowman MD   REFERRING PHYSICIAN: Cynthia Bowman MD   PATIENT ACCOUNT #: [de-identified] LOCATION: S per hour and the spontaneous arousal index is 15 events per hour for a combined arousal index of 20.8 events per hour.   There were 52 periodic limb movements for a periodic limb movement index of 9 events per hour of which 3.4 per hour were associated with

## 2019-11-18 NOTE — TELEPHONE ENCOUNTER
Order entered in [address] and faxed to [facility].

## 2019-11-22 ENCOUNTER — TELEPHONE (OUTPATIENT)
Dept: NEPHROLOGY | Facility: CLINIC | Age: 64
End: 2019-11-22

## 2019-11-22 NOTE — TELEPHONE ENCOUNTER
Pt's Referral # [id number] has been authorized for a CPAP machine. Is there a reason why a new machine is being requested? Please advise. Thank you.

## 2019-11-26 NOTE — TELEPHONE ENCOUNTER
Reji Herrera. Referral # [id number] is auth'd & expires on 9/21/2020. Referral # [id number] has been faxed to [facility].

## 2019-11-29 ENCOUNTER — TELEPHONE (OUTPATIENT)
Dept: NEPHROLOGY | Facility: CLINIC | Age: 64
End: 2019-11-29

## 2019-11-29 NOTE — TELEPHONE ENCOUNTER
Routed to Dr. Ramonia Kayser to see if he received papers to sign for this (out of the office until Monday 12/2/19. LM on  to inform Chioma/ KIERAN.

## 2019-12-02 NOTE — TELEPHONE ENCOUNTER
LM on vm---The only form that Dr. Dawit Whitehead received was the One step form for CPAP on 11/18/19. He does not have any form for Dream station auto PAP. Requested that KIERAN/Chioam fax this form to the office if this is another form that Dr. Dawit Whitehead has to sign.

## 2019-12-02 NOTE — TELEPHONE ENCOUNTER
Chioma/KIERAN called re: one step order form for CPAP. Please call with status. Fax is [phone].

## 2019-12-06 ENCOUNTER — TELEPHONE (OUTPATIENT)
Dept: NEPHROLOGY | Facility: CLINIC | Age: 64
End: 2019-12-06

## 2019-12-10 NOTE — TELEPHONE ENCOUNTER
Patient contacted. She did get a call from [facility] and they will set up her CPAP. Instructed her per Dr. Deonte Casas to see him 1 month after being on CPAP. She will call back to schedule.

## 2019-12-13 RX ORDER — PANTOPRAZOLE SODIUM 40 MG/1
TABLET, DELAYED RELEASE ORAL
Qty: 30 TABLET | Refills: 5 | Status: SHIPPED | OUTPATIENT
Start: 2019-12-13 | End: 2020-09-28

## 2019-12-13 NOTE — TELEPHONE ENCOUNTER
LOV 9/16/19. RTC in 6 mos (3/2020) Sleep apnea f/u scheduled for 12/17/19.  Refill pended and routed to Dr. Dawit Whitehead for approval.

## 2019-12-17 ENCOUNTER — OFFICE VISIT (OUTPATIENT)
Dept: NEPHROLOGY | Facility: CLINIC | Age: 64
End: 2019-12-17

## 2019-12-17 VITALS
WEIGHT: 261.38 LBS | DIASTOLIC BLOOD PRESSURE: 88 MMHG | HEART RATE: 91 BPM | HEIGHT: 59 IN | SYSTOLIC BLOOD PRESSURE: 144 MMHG | BODY MASS INDEX: 52.69 KG/M2

## 2019-12-17 DIAGNOSIS — J32.9 SINUSITIS, UNSPECIFIED CHRONICITY, UNSPECIFIED LOCATION: ICD-10-CM

## 2019-12-17 DIAGNOSIS — M54.42 ACUTE LEFT-SIDED LOW BACK PAIN WITH LEFT-SIDED SCIATICA: Primary | ICD-10-CM

## 2019-12-17 PROCEDURE — 99214 OFFICE O/P EST MOD 30 MIN: CPT | Performed by: INTERNAL MEDICINE

## 2019-12-17 RX ORDER — FLUTICASONE PROPIONATE 50 MCG
2 SPRAY, SUSPENSION (ML) NASAL DAILY
Qty: 1 INHALER | Refills: 0 | Status: SHIPPED | OUTPATIENT
Start: 2019-12-17 | End: 2020-02-28

## 2019-12-17 RX ORDER — AMOXICILLIN AND CLAVULANATE POTASSIUM 875; 125 MG/1; MG/1
1 TABLET, FILM COATED ORAL 2 TIMES DAILY
Qty: 20 TABLET | Refills: 0 | Status: SHIPPED | OUTPATIENT
Start: 2019-12-17 | End: 2020-02-28 | Stop reason: ALTCHOICE

## 2019-12-18 RX ORDER — FLUTICASONE PROPIONATE 50 MCG
SPRAY, SUSPENSION (ML) NASAL
Qty: 3 BOTTLE | Refills: 1 | OUTPATIENT
Start: 2019-12-18

## 2019-12-18 NOTE — PATIENT INSTRUCTIONS
Let me know if physical therapy does not help your low back pain and pain down the left leg. Let me know if your sinus symptoms do not improve.

## 2019-12-18 NOTE — PROGRESS NOTES
[facility]  Nephrology Daily Progress Note    Alyson Smith  [id number]  60year old  Patient presents with:  Post Nasal Drip  Back Pain      HPI:   Alyson Smith is a 60year old female.   60-year-old female with a history of hypertension, hyperchol - -   Pulse - - 91   Resp - - -   Temp - - -   SpO2 - - -   Weight 257 lbs - 261 lbs 6 oz   Height 59 - 59   BODY MASS INDEX - 52.8 -       VITALS: WEIGHT ONLY 9/16/2019 10/28/2019 12/17/2019   Weight 250 lbs 3 oz 257 lbs 261 lbs 6 oz       Constitutional: ergocalciferol 78891 units Oral Cap, Take 1 CAPSULE ONCE A MONTH, Disp: 12 capsule, Rfl: 0  •  LEVOTHYROXINE SODIUM 175 MCG Oral Tab, TAKE 1 TABLET(175 MCG) BY MOUTH BEFORE BREAKFAST, Disp: 90 tablet, Rfl: 1  •  Cyanocobalamin (VITAMIN B-12 OR), Take 1 ta Complex Oral Tab, Take 1 tablet by mouth daily. , Disp: , Rfl:   •  triamcinolone acetonide 0.5 % External Cream, Use bid to affected areas of skin (Patient not taking: Reported on 12/17/2019 ), Disp: 30 g, Rfl: 3  •  Fluticasone Propionate 50 MCG/ACT Nasal and left lower extremity radicular symptoms. If she does not respond we will need an MRI of her lumbar spine. She will call if not improving. Hopefully will continue CPAP. Weight unfortunately continues to increase. Up 11 pounds since last visit.   Ret

## 2020-01-13 ENCOUNTER — OFFICE VISIT (OUTPATIENT)
Dept: SURGERY | Facility: CLINIC | Age: 65
End: 2020-01-13

## 2020-01-13 VITALS
HEART RATE: 93 BPM | SYSTOLIC BLOOD PRESSURE: 160 MMHG | BODY MASS INDEX: 51.55 KG/M2 | HEIGHT: 59 IN | DIASTOLIC BLOOD PRESSURE: 85 MMHG | WEIGHT: 255.69 LBS

## 2020-01-13 DIAGNOSIS — E11.9 TYPE 2 DIABETES MELLITUS WITHOUT RETINOPATHY (HCC): Primary | ICD-10-CM

## 2020-01-13 DIAGNOSIS — R05.9 COUGH: ICD-10-CM

## 2020-01-13 DIAGNOSIS — E78.00 PURE HYPERCHOLESTEROLEMIA: ICD-10-CM

## 2020-01-13 DIAGNOSIS — I10 ESSENTIAL HYPERTENSION: ICD-10-CM

## 2020-01-13 DIAGNOSIS — F43.9 STRESS: ICD-10-CM

## 2020-01-13 DIAGNOSIS — Z51.81 ENCOUNTER FOR THERAPEUTIC DRUG MONITORING: ICD-10-CM

## 2020-01-13 DIAGNOSIS — E66.01 MORBID OBESITY WITH BMI OF 50.0-59.9, ADULT (HCC): ICD-10-CM

## 2020-01-13 PROCEDURE — 99214 OFFICE O/P EST MOD 30 MIN: CPT | Performed by: INTERNAL MEDICINE

## 2020-01-13 RX ORDER — PHENTERMINE HYDROCHLORIDE 15 MG/1
15 CAPSULE ORAL EVERY MORNING
Qty: 30 CAPSULE | Refills: 2 | Status: SHIPPED | OUTPATIENT
Start: 2020-01-13 | End: 2020-03-05 | Stop reason: ALTCHOICE

## 2020-01-13 NOTE — PROGRESS NOTES
[facility]ATR[facility] AND [facility]IGHT [facility] CLINIC  [address] [place] [address]  Dept: [phone]     Date:   18    Patient:  Emelia Baca  :      10/24/1955  MRN:      [id number]    Chief Complaint:  Patient presents with:   Demetrius Perez Medication Sig Dispense Refill   • Fluticasone Propionate 50 MCG/ACT Nasal Suspension 2 sprays by Each Nare route daily.  1 Inhaler 0   • PANTOPRAZOLE SODIUM 40 MG Oral Tab EC TAKE 1 TABLET(40 MG) BY MOUTH EVERY MORNING BEFORE BREAKFAST 30 tablet 5   • Ph Inhalation Aero Soln Inhale 2 puffs into the lungs 4 (four) times daily as needed for Wheezing.  1 Inhaler 1   • Insulin Pen Needle (NOVOFINE) 32G X 6 MM Does not apply Misc Use one needle with each use 1 Box 3   • Blood Glucose Monitoring Suppl (MORENO CONT Attends Mandaen service: Not on file        Active member of club or organization: Not on file        Attends meetings of clubs or organizations: Not on file        Relationship status: Not on file      Intimate partner violence:        Fear of current Habits  · Patient states the following:  · Eats 3 meal(s) per day  · Length of time it takes to consume a meal:  20  · # of snacks per day: 1 Type of snacks:   sweets  · Amount of soda consumption per day:  diet  · Amount of water (in ounces) per day:  jarocho states that her cholesterol has been well controlled on her current medication.     Lab Results   Component Value Date/Time    CHOLEST 231 (H) 07/24/2019 03:59 PM     (H) 07/24/2019 03:59 PM    HDL 48 07/24/2019 03:59 PM    TRIG 185 (H) 07/24/2019 03 snacks in advance    Binge eating: Tolerating Vyvanse 70mg- denies CP, SOB, palpitations.       Continue trulicity   Follow up with endo team      PHENTERMINE: tolerating well  Taking at noon  ekg done      Blood work done    Attended seminar      Continue

## 2020-02-06 ENCOUNTER — APPOINTMENT (OUTPATIENT)
Dept: PHYSICAL THERAPY | Age: 65
End: 2020-02-06
Attending: INTERNAL MEDICINE
Payer: COMMERCIAL

## 2020-02-11 ENCOUNTER — APPOINTMENT (OUTPATIENT)
Dept: PHYSICAL THERAPY | Age: 65
End: 2020-02-11
Attending: INTERNAL MEDICINE
Payer: COMMERCIAL

## 2020-02-11 ENCOUNTER — TELEPHONE (OUTPATIENT)
Dept: NEPHROLOGY | Facility: CLINIC | Age: 65
End: 2020-02-11

## 2020-02-11 NOTE — TELEPHONE ENCOUNTER
May just be pulled muscle from coughing. Can use Tylenol or Aleve up to 2 bid. [facility] [place]   Heating pad but see if not better

## 2020-02-11 NOTE — TELEPHONE ENCOUNTER
Can not refill without FU apt in the next month  Since it has been a year  Hence okay to send a one month refill once she books apt in the next month, okay to overbook  Otherwise can send to PCP.    Thanks

## 2020-02-11 NOTE — TELEPHONE ENCOUNTER
Pt complains of bilateral rib pain for 4 days. States it feels sore underneath and inside her rib cage. Denies trauma to the area. She's had a cold with a cough for a long time and her cold if starting to get better. Pt denies increased SOB, CP, fever.  At

## 2020-02-11 NOTE — TELEPHONE ENCOUNTER
[id number]  LJGQOPSPM 3.5JP/9.1QE SDP 4X0. 5ML  Inject 1.5 MG under the skin every 7 days  Prescribed Qty:6  Last Refill: 10/21/19

## 2020-02-13 ENCOUNTER — APPOINTMENT (OUTPATIENT)
Dept: PHYSICAL THERAPY | Age: 65
End: 2020-02-13
Attending: INTERNAL MEDICINE
Payer: COMMERCIAL

## 2020-02-17 ENCOUNTER — TELEPHONE (OUTPATIENT)
Dept: NEPHROLOGY | Facility: CLINIC | Age: 65
End: 2020-02-17

## 2020-02-17 DIAGNOSIS — E11.9 DIABETES MELLITUS WITHOUT COMPLICATION (HCC): Primary | ICD-10-CM

## 2020-02-17 NOTE — TELEPHONE ENCOUNTER
Pt asking for Dr to order US - she is still having back pain -     She also needs referral for appt with Dr Erika Holland on 3/30 and Dr Franco Duran

## 2020-02-17 NOTE — TELEPHONE ENCOUNTER
Helped schedule apt 3/30/20. Pt aware of new office location and to call PCP for refferal.     Pended 1 month supply.

## 2020-02-18 NOTE — TELEPHONE ENCOUNTER
Patient contacted. She said she called about this pain 2 weeks ago and was told to wait and see if it improved. It has not. She said pain is severe on the right side under her ribs. See 2/11/2020 TE. First available appointment is Friday 2/21/2020.

## 2020-02-20 ENCOUNTER — APPOINTMENT (OUTPATIENT)
Dept: PHYSICAL THERAPY | Age: 65
End: 2020-02-20
Attending: INTERNAL MEDICINE
Payer: COMMERCIAL

## 2020-02-21 ENCOUNTER — OFFICE VISIT (OUTPATIENT)
Dept: NEPHROLOGY | Facility: CLINIC | Age: 65
End: 2020-02-21

## 2020-02-21 VITALS
HEIGHT: 59 IN | WEIGHT: 260.81 LBS | BODY MASS INDEX: 52.58 KG/M2 | SYSTOLIC BLOOD PRESSURE: 158 MMHG | DIASTOLIC BLOOD PRESSURE: 91 MMHG | HEART RATE: 106 BPM

## 2020-02-21 DIAGNOSIS — R53.83 OTHER FATIGUE: ICD-10-CM

## 2020-02-21 DIAGNOSIS — R10.11 RIGHT UPPER QUADRANT ABDOMINAL PAIN: Primary | ICD-10-CM

## 2020-02-21 DIAGNOSIS — M77.8 TENDINITIS OF ELBOW: ICD-10-CM

## 2020-02-21 PROCEDURE — 99214 OFFICE O/P EST MOD 30 MIN: CPT | Performed by: INTERNAL MEDICINE

## 2020-02-22 NOTE — PROGRESS NOTES
[facility], [facility]  Nephrology Daily Progress Note    Rick Newman  [id number]  60year old  Patient presents with:  Pain: c/o pain under right rib cage      HPI:   Rick Newman is a 60year old female.   57-year-old female with a history of hypertension, - 52.68 -       VITALS: WEIGHT ONLY 12/17/2019 1/13/2020 2/21/2020   Weight 261 lbs 6 oz 255 lbs 11 oz 260 lbs 13 oz       Constitutional: appears well hydrated alert and responsive no acute distress noted  Neck/Thyroid: neck is supple without adenopathy 80861 units Oral Cap, Take 1 CAPSULE ONCE A MONTH, Disp: 12 capsule, Rfl: 0  •  LEVOTHYROXINE SODIUM 175 MCG Oral Tab, TAKE 1 TABLET(175 MCG) BY MOUTH BEFORE BREAKFAST, Disp: 90 tablet, Rfl: 1  •  Estradiol 0.1 MG/GM Vaginal Cream, Apply pea sized amount t (Patient not taking: Reported on 2/21/2020 ), Disp: 2 mL, Rfl: 0  •  Amoxicillin-Pot Clavulanate 875-125 MG Oral Tab, Take 1 tablet by mouth 2 (two) times daily.  (Patient not taking: Reported on 1/13/2020 ), Disp: 20 tablet, Rfl: 0  •  PANTOPRAZOLE SODIUM nonrevealing and fatigue is still an issue we will have her see pulmonary to help with her treatment of sleep apnea. She also has tenderness in the medial aspect of her left arm consistent with tendinitis.   Is been there a month or 2 without improvement i

## 2020-02-25 ENCOUNTER — OFFICE VISIT (OUTPATIENT)
Dept: PHYSICAL THERAPY | Age: 65
End: 2020-02-25
Attending: INTERNAL MEDICINE
Payer: COMMERCIAL

## 2020-02-25 DIAGNOSIS — M54.42 ACUTE LEFT-SIDED LOW BACK PAIN WITH LEFT-SIDED SCIATICA: ICD-10-CM

## 2020-02-25 PROCEDURE — 97163 PT EVAL HIGH COMPLEX 45 MIN: CPT

## 2020-02-25 PROCEDURE — 97110 THERAPEUTIC EXERCISES: CPT

## 2020-02-25 NOTE — PROGRESS NOTES
LUMBAR SPINE EVALUATION:   Referring Physician: Dr. Milvia Persaud  Date of Onset: Feb 2020 Date of Service: 2/25/2020   Acute left-sided low back pain with left-sided sciatica (M54.42)  PATIENT SUMMARY:   Alyson Smith is a 60year old y/o female who present discussed evaluation findings, pathology, POC and HEP. Pt voiced understanding and performs HEP correctly without reported pain. Skilled Physical Therapy is medically necessary to address the above impairments and reach functional goals.      In agreement Total Treatment Time: 45 min      PLAN OF CARE:    Goals:  (to be met in 8 visits)  1. Pt will be I with HEP to improve therapy outcome and self manage symptoms. 2. Pt will be able to sit to stand without LBP.   3. Pt will demonstrate improved lumbar flex

## 2020-02-26 ENCOUNTER — TELEPHONE (OUTPATIENT)
Dept: ENDOCRINOLOGY CLINIC | Facility: CLINIC | Age: 65
End: 2020-02-26

## 2020-02-26 ENCOUNTER — HOSPITAL ENCOUNTER (OUTPATIENT)
Dept: CT IMAGING | Facility: HOSPITAL | Age: 65
Discharge: HOME | End: 2020-02-26
Attending: INTERNAL MEDICINE
Payer: COMMERCIAL

## 2020-02-26 DIAGNOSIS — R10.11 RIGHT UPPER QUADRANT ABDOMINAL PAIN: ICD-10-CM

## 2020-02-26 DIAGNOSIS — R79.89 LOW VITAMIN D LEVEL: Primary | ICD-10-CM

## 2020-02-26 DIAGNOSIS — Z13.29 THYROID DISORDER SCREENING: ICD-10-CM

## 2020-02-26 LAB — CREAT BLD-MCNC: 0.8 MG/DL (ref 0.55–1.02)

## 2020-02-26 PROCEDURE — 74160 CT ABDOMEN W/CONTRAST: CPT | Performed by: INTERNAL MEDICINE

## 2020-02-26 PROCEDURE — 82565 ASSAY OF CREATININE: CPT

## 2020-02-26 NOTE — TELEPHONE ENCOUNTER
Patient requesting orders for labs - requesting the orders be the same labs as last time for comparison. Please call. Thank you.

## 2020-02-26 NOTE — TELEPHONE ENCOUNTER
LOV  2/5/19    Fu 3/30/20    TSH, Vit d and vit b12 pended . Any other labs . Pt has orders in system by dr Lenora Allen also    Patient requesting orders for labs - requesting the orders be the same labs as last time for comparison. Please call. Thank you.

## 2020-02-27 ENCOUNTER — LAB ENCOUNTER (OUTPATIENT)
Dept: LAB | Facility: HOSPITAL | Age: 65
End: 2020-02-27
Attending: INTERNAL MEDICINE
Payer: COMMERCIAL

## 2020-02-27 ENCOUNTER — TELEPHONE (OUTPATIENT)
Dept: NEPHROLOGY | Facility: HOSPITAL | Age: 65
End: 2020-02-27

## 2020-02-27 ENCOUNTER — APPOINTMENT (OUTPATIENT)
Dept: PHYSICAL THERAPY | Age: 65
End: 2020-02-27
Attending: INTERNAL MEDICINE
Payer: COMMERCIAL

## 2020-02-27 DIAGNOSIS — R79.89 LOW VITAMIN D LEVEL: ICD-10-CM

## 2020-02-27 DIAGNOSIS — R53.83 OTHER FATIGUE: ICD-10-CM

## 2020-02-27 DIAGNOSIS — R10.11 RIGHT UPPER QUADRANT ABDOMINAL PAIN: ICD-10-CM

## 2020-02-27 LAB
ALBUMIN SERPL-MCNC: 3.3 G/DL (ref 3.4–5)
ALBUMIN/GLOB SERPL: 0.8 {RATIO} (ref 1–2)
ALP LIVER SERPL-CCNC: 71 U/L (ref 50–130)
ALT SERPL-CCNC: 23 U/L (ref 13–56)
ANION GAP SERPL CALC-SCNC: 9 MMOL/L (ref 0–18)
AST SERPL-CCNC: 18 U/L (ref 15–37)
BASOPHILS # BLD AUTO: 0.05 X10(3) UL (ref 0–0.2)
BASOPHILS NFR BLD AUTO: 0.6 %
BILIRUB SERPL-MCNC: 1.1 MG/DL (ref 0.1–2)
BILIRUB UR QL: NEGATIVE
BUN BLD-MCNC: 22 MG/DL (ref 7–18)
BUN/CREAT SERPL: 25.3 (ref 10–20)
CALCIUM BLD-MCNC: 9.2 MG/DL (ref 8.5–10.1)
CHLORIDE SERPL-SCNC: 103 MMOL/L (ref 98–112)
CHOLEST SMN-MCNC: 250 MG/DL (ref ?–200)
CLARITY UR: CLEAR
CO2 SERPL-SCNC: 28 MMOL/L (ref 21–32)
COLOR UR: YELLOW
CREAT BLD-MCNC: 0.87 MG/DL (ref 0.55–1.02)
DEPRECATED RDW RBC AUTO: 46.8 FL (ref 35.1–46.3)
EOSINOPHIL # BLD AUTO: 0.18 X10(3) UL (ref 0–0.7)
EOSINOPHIL NFR BLD AUTO: 2.1 %
ERYTHROCYTE [DISTWIDTH] IN BLOOD BY AUTOMATED COUNT: 14.1 % (ref 11–15)
GLOBULIN PLAS-MCNC: 4.1 G/DL (ref 2.8–4.4)
GLUCOSE BLD-MCNC: 165 MG/DL (ref 70–99)
GLUCOSE UR-MCNC: NEGATIVE MG/DL
HCT VFR BLD AUTO: 41.9 % (ref 35–48)
HDLC SERPL-MCNC: 42 MG/DL (ref 40–59)
HGB BLD-MCNC: 13.7 G/DL (ref 12–16)
HGB UR QL STRIP.AUTO: NEGATIVE
IMM GRANULOCYTES # BLD AUTO: 0.06 X10(3) UL (ref 0–1)
IMM GRANULOCYTES NFR BLD: 0.7 %
KETONES UR-MCNC: NEGATIVE MG/DL
LDLC SERPL CALC-MCNC: 158 MG/DL (ref ?–100)
LYMPHOCYTES # BLD AUTO: 1.62 X10(3) UL (ref 1–4)
LYMPHOCYTES NFR BLD AUTO: 18.5 %
M PROTEIN MFR SERPL ELPH: 7.4 G/DL (ref 6.4–8.2)
MCH RBC QN AUTO: 29.5 PG (ref 26–34)
MCHC RBC AUTO-ENTMCNC: 32.7 G/DL (ref 31–37)
MCV RBC AUTO: 90.1 FL (ref 80–100)
MONOCYTES # BLD AUTO: 0.53 X10(3) UL (ref 0.1–1)
MONOCYTES NFR BLD AUTO: 6.1 %
NEUTROPHILS # BLD AUTO: 6.3 X10 (3) UL (ref 1.5–7.7)
NEUTROPHILS # BLD AUTO: 6.3 X10(3) UL (ref 1.5–7.7)
NEUTROPHILS NFR BLD AUTO: 72 %
NITRITE UR QL STRIP.AUTO: NEGATIVE
NONHDLC SERPL-MCNC: 208 MG/DL (ref ?–130)
OSMOLALITY SERPL CALC.SUM OF ELEC: 297 MOSM/KG (ref 275–295)
PATIENT FASTING Y/N/NP: YES
PATIENT FASTING Y/N/NP: YES
PH UR: 5 [PH] (ref 5–8)
PLATELET # BLD AUTO: 295 10(3)UL (ref 150–450)
POTASSIUM SERPL-SCNC: 3.3 MMOL/L (ref 3.5–5.1)
PROT UR-MCNC: NEGATIVE MG/DL
RBC # BLD AUTO: 4.65 X10(6)UL (ref 3.8–5.3)
RBC #/AREA URNS AUTO: 1 /HPF
SODIUM SERPL-SCNC: 140 MMOL/L (ref 136–145)
SP GR UR STRIP: 1.02 (ref 1–1.03)
T4 FREE SERPL-MCNC: 1.2 NG/DL (ref 0.8–1.7)
TRIGL SERPL-MCNC: 249 MG/DL (ref 30–149)
TSI SER-ACNC: 6.54 MIU/ML (ref 0.36–3.74)
UROBILINOGEN UR STRIP-ACNC: <2
VIT B12 SERPL-MCNC: 741 PG/ML (ref 193–986)
VLDLC SERPL CALC-MCNC: 50 MG/DL (ref 0–30)
WBC # BLD AUTO: 8.7 X10(3) UL (ref 4–11)
WBC #/AREA URNS AUTO: 4 /HPF

## 2020-02-27 PROCEDURE — 80061 LIPID PANEL: CPT

## 2020-02-27 PROCEDURE — 36415 COLL VENOUS BLD VENIPUNCTURE: CPT

## 2020-02-27 PROCEDURE — 80053 COMPREHEN METABOLIC PANEL: CPT

## 2020-02-27 PROCEDURE — 84443 ASSAY THYROID STIM HORMONE: CPT

## 2020-02-27 PROCEDURE — 81001 URINALYSIS AUTO W/SCOPE: CPT

## 2020-02-27 PROCEDURE — 84439 ASSAY OF FREE THYROXINE: CPT

## 2020-02-27 PROCEDURE — 82306 VITAMIN D 25 HYDROXY: CPT

## 2020-02-27 PROCEDURE — 82607 VITAMIN B-12: CPT

## 2020-02-27 PROCEDURE — 85025 COMPLETE CBC W/AUTO DIFF WBC: CPT

## 2020-02-27 NOTE — TELEPHONE ENCOUNTER
Patient calling for results from labs, informed Kelly Herrera is not in clinic today, please call [phone].

## 2020-02-28 ENCOUNTER — TELEPHONE (OUTPATIENT)
Dept: NEPHROLOGY | Facility: CLINIC | Age: 65
End: 2020-02-28

## 2020-02-28 DIAGNOSIS — R79.89 LOW VITAMIN D LEVEL: ICD-10-CM

## 2020-02-28 DIAGNOSIS — E03.9 ACQUIRED HYPOTHYROIDISM: ICD-10-CM

## 2020-02-28 DIAGNOSIS — R93.89 ABNORMAL FINDING ON RADIOLOGY EXAM: Primary | ICD-10-CM

## 2020-02-28 DIAGNOSIS — E78.00 PURE HYPERCHOLESTEROLEMIA: Primary | ICD-10-CM

## 2020-02-28 LAB — 25(OH)D3 SERPL-MCNC: 21 NG/ML (ref 30–100)

## 2020-02-28 RX ORDER — ERGOCALCIFEROL 1.25 MG/1
CAPSULE ORAL
Qty: 12 CAPSULE | Refills: 0 | Status: SHIPPED | OUTPATIENT
Start: 2020-02-28 | End: 2020-05-01

## 2020-02-28 RX ORDER — LEVOTHYROXINE SODIUM 0.1 MG/1
100 TABLET ORAL
Qty: 30 TABLET | Refills: 5 | Status: SHIPPED | OUTPATIENT
Start: 2020-02-28 | End: 2020-04-27 | Stop reason: DRUGHIGH

## 2020-02-28 RX ORDER — LEVOTHYROXINE SODIUM 88 UG/1
88 TABLET ORAL
Qty: 30 TABLET | Refills: 5 | Status: SHIPPED | OUTPATIENT
Start: 2020-02-28 | End: 2020-04-27 | Stop reason: DRUGHIGH

## 2020-02-28 RX ORDER — ERGOCALCIFEROL 1.25 MG/1
CAPSULE ORAL
Qty: 8 CAPSULE | Refills: 0 | Status: SHIPPED | OUTPATIENT
Start: 2020-02-28 | End: 2020-02-28

## 2020-02-28 NOTE — TELEPHONE ENCOUNTER
Vitamin D level is low. Start vitamin D 50,000 international units, 1 q. weekly x8 weeks. Potassium is borderline low. Increase potassium content in diet. Fasting blood sugar is too high and needs to review with endocrine.   Cholesterol is also too high

## 2020-02-28 NOTE — TELEPHONE ENCOUNTER
Prescription was already sent to the pharmacy and not to be taking for 90 days only 8 weeks as Dr. Li Vance ordered.

## 2020-02-28 NOTE — TELEPHONE ENCOUNTER
Patient contacted. Results message read. Patient admits to forgetting to take Crestor often because at night she is tired and often falls asleep before she takes it. She said she will take it in the morning because it's easier to remember.  Knows to start V

## 2020-02-28 NOTE — TELEPHONE ENCOUNTER
Left detailed message on voice mail with Dr. Joel Caal advice as office phones are now off and will not be back until Monday.

## 2020-02-28 NOTE — TELEPHONE ENCOUNTER
Patient contacted. Results message read. She will do MRI as ordered. Scheduling phone# provided and patient will get this test scheduled. Patient states that yes she is still having abdominal pain.

## 2020-02-28 NOTE — TELEPHONE ENCOUNTER
Patient would like to speak with nurse, for any recommendations to aleve the pain and discomfort, please call at:[phone],thanks.

## 2020-02-28 NOTE — TELEPHONE ENCOUNTER
CT scan of the abdomen really did not show much. There is a lesion in the liver which is probably just a cyst but do an MRI of the liver with contrast to confirm. If she still having abdominal pain? ?

## 2020-03-02 ENCOUNTER — TELEPHONE (OUTPATIENT)
Dept: NEPHROLOGY | Facility: CLINIC | Age: 65
End: 2020-03-02

## 2020-03-02 ENCOUNTER — OFFICE VISIT (OUTPATIENT)
Dept: NEPHROLOGY | Facility: CLINIC | Age: 65
End: 2020-03-02

## 2020-03-02 VITALS
BODY MASS INDEX: 51.85 KG/M2 | SYSTOLIC BLOOD PRESSURE: 144 MMHG | HEIGHT: 59 IN | DIASTOLIC BLOOD PRESSURE: 88 MMHG | WEIGHT: 257.19 LBS | HEART RATE: 92 BPM

## 2020-03-02 DIAGNOSIS — I10 ESSENTIAL HYPERTENSION: Primary | ICD-10-CM

## 2020-03-02 DIAGNOSIS — R10.11 RIGHT UPPER QUADRANT ABDOMINAL PAIN: ICD-10-CM

## 2020-03-02 DIAGNOSIS — E03.9 ACQUIRED HYPOTHYROIDISM: ICD-10-CM

## 2020-03-02 DIAGNOSIS — E11.9 DIABETES MELLITUS WITHOUT COMPLICATION (HCC): ICD-10-CM

## 2020-03-02 PROCEDURE — 99214 OFFICE O/P EST MOD 30 MIN: CPT | Performed by: INTERNAL MEDICINE

## 2020-03-02 RX ORDER — AZITHROMYCIN 250 MG/1
TABLET, FILM COATED ORAL
Qty: 1 PACKAGE | Refills: 0 | Status: SHIPPED | OUTPATIENT
Start: 2020-03-02 | End: 2020-05-08

## 2020-03-02 RX ORDER — CLOTRIMAZOLE AND BETAMETHASONE DIPROPIONATE 10; .64 MG/G; MG/G
1 CREAM TOPICAL 2 TIMES DAILY
Qty: 45 G | Refills: 2 | Status: SHIPPED | OUTPATIENT
Start: 2020-03-02 | End: 2020-06-24 | Stop reason: ALTCHOICE

## 2020-03-02 NOTE — TELEPHONE ENCOUNTER
LOV: 02/05/19  LR: 10/22/19    Future Appointments   Date Time Provider Toney Morin   3/30/2020  1:45 PM Nieves Acuan MD [address] [place] [address] OF [facility]     Contacted pharmacy since there was a script for trulicity sent on 33/35/07.     Per Jovanny Corona, [facility]

## 2020-03-02 NOTE — TELEPHONE ENCOUNTER
Patient wanted a refill on Trulicity. She needs to get this from Dr. Kem Blancas as there is no dose in the chart.

## 2020-03-03 ENCOUNTER — APPOINTMENT (OUTPATIENT)
Dept: PHYSICAL THERAPY | Age: 65
End: 2020-03-03
Attending: INTERNAL MEDICINE
Payer: COMMERCIAL

## 2020-03-04 ENCOUNTER — PATIENT MESSAGE (OUTPATIENT)
Dept: ENDOCRINOLOGY CLINIC | Facility: CLINIC | Age: 65
End: 2020-03-04

## 2020-03-05 ENCOUNTER — OFFICE VISIT (OUTPATIENT)
Dept: OPHTHALMOLOGY | Facility: CLINIC | Age: 65
End: 2020-03-05

## 2020-03-05 ENCOUNTER — APPOINTMENT (OUTPATIENT)
Dept: PHYSICAL THERAPY | Age: 65
End: 2020-03-05
Attending: INTERNAL MEDICINE
Payer: COMMERCIAL

## 2020-03-05 DIAGNOSIS — E11.9 TYPE 2 DIABETES MELLITUS WITHOUT RETINOPATHY (HCC): Primary | ICD-10-CM

## 2020-03-05 DIAGNOSIS — H25.13 AGE-RELATED NUCLEAR CATARACT OF BOTH EYES: ICD-10-CM

## 2020-03-05 PROCEDURE — 92014 COMPRE OPH EXAM EST PT 1/>: CPT | Performed by: OPHTHALMOLOGY

## 2020-03-05 PROCEDURE — 92015 DETERMINE REFRACTIVE STATE: CPT | Performed by: OPHTHALMOLOGY

## 2020-03-05 NOTE — ASSESSMENT & PLAN NOTE
Discussed early cataracts with patient. No treatment recommended at this time. RX for separate distance and reading glasses per patient's choice.    Reassured patient that other than cataracts, eyes look very healthy; there is no evidence of glaucoma or

## 2020-03-05 NOTE — PROGRESS NOTES
Bertha Kauffman is a 60year old female.     HPI:     HPI     Consult      Additional comments: Per Dr. Jona Mills              Diabetic Eye Exam      Additional comments: Pt has been a diabetic for 3 years       Pt's diabetes is currently controlled by diet  P History: Social History    Tobacco Use      Smoking status: Former Smoker        Packs/day: 0.50        Years: 7.00        Pack years: 3.5        Types: Cigarettes        Quit date: 2000        Years since quittin.6      Smokeless tobacco: Never Suspension 2 sprays by Each Nare route daily. 1 Bottle 3   • Ketoconazole 2 % External Shampoo Apply to scalp 3 times per week.  240 mL 12   • Fluocinonide 0.05 % External Solution Use bid to scalp 60 mL 12   • Blood Glucose Monitoring Suppl (MORENO CONTOUR Sphere Cylinder Axis    Right +4.25 +0.50 095    Left +4.00 +0.50 090    Type:  Reading only          Manifest Refraction       Sphere Cylinder [place] [facility] [place]    Right +1.75 +0.25 095 20/20 +2.50 20/20    Left +1.25 +0.25 090 20/20 +2.50 20/

## 2020-03-05 NOTE — PROGRESS NOTES
[facility], [facility]  Nephrology Daily Progress Note    Pia Starr  [id number]  60year old  Patient presents with: Annual      HPI:   Pia Starr is a 60year old female.   60-year-old female with a history of hypertension, hypercholesterolemia, adult o Negative for bone/joint symptoms  Neurological:  Negative for gait disturbance  Psychiatric:  Negative for inappropriate interaction and psychiatric symptoms  Respiratory:  Negative for cough, dyspnea and wheezing      PHYSICAL EXAM:     [facility]   GFRNAA 72   GFRAA 83       Imaging        Medications:    Current Outpatient Medications:   •  azithromycin (ZITHROMAX Z-MYA) 250 MG Oral Tab, As directed, Disp: 1 Package, Rfl: 0  •  clotrimazole-betamethasone 1-0.05 % External Cream, Ap 2/21/2020), Disp: 30 tablet, Rfl: 5  •  Cyanocobalamin (VITAMIN B-12 OR), Take 1 tablet by mouth daily. , Disp: , Rfl:   •  Vitamin B-12 100 MCG Oral Tab, Take 1 tablet (100 mcg total) by mouth daily. , Disp: , Rfl: 0  •  Blood Glucose Monitoring Suppl (Maryln Curling will refer to general surgery for possible small hernia. Follow-up with endocrine. Reviewed labs from February 27, 2020. Fasting blood sugar 165 and cholesterol though was markedly elevated 250 with an LDL of 158.   She states she often forgets to take i

## 2020-03-05 NOTE — PATIENT INSTRUCTIONS
Type 2 diabetes mellitus without retinopathy ([facility] [address]  Diabetes type II: no background of retinopathy, no signs of neovascularization noted. Discussed ocular and systemic benefits of blood sugar control.   Diagnosis and treatment discussed in detail with angy

## 2020-03-09 ENCOUNTER — TELEPHONE (OUTPATIENT)
Dept: NEPHROLOGY | Facility: CLINIC | Age: 65
End: 2020-03-09

## 2020-03-09 RX ORDER — BENZONATATE 100 MG/1
100 CAPSULE ORAL 3 TIMES DAILY PRN
Qty: 20 CAPSULE | Refills: 0 | Status: SHIPPED | OUTPATIENT
Start: 2020-03-09 | End: 2020-06-24 | Stop reason: ALTCHOICE

## 2020-03-09 RX ORDER — LEVOFLOXACIN 500 MG/1
500 TABLET, FILM COATED ORAL DAILY
Qty: 7 TABLET | Refills: 0 | Status: SHIPPED | OUTPATIENT
Start: 2020-03-09 | End: 2020-03-16

## 2020-03-09 NOTE — TELEPHONE ENCOUNTER
pt. states that she continues to have symptoms. Pt. States that she finished taking her med over the weekend.

## 2020-03-09 NOTE — TELEPHONE ENCOUNTER
Patient still complaining of phlegm, nasal congestion, cough. She finished z-marko over the weekend. Denies fever. Asking if she can tessalon pearls for cough and maybe another antibiotic.

## 2020-03-10 ENCOUNTER — APPOINTMENT (OUTPATIENT)
Dept: PHYSICAL THERAPY | Age: 65
End: 2020-03-10
Attending: INTERNAL MEDICINE
Payer: COMMERCIAL

## 2020-03-12 ENCOUNTER — APPOINTMENT (OUTPATIENT)
Dept: PHYSICAL THERAPY | Age: 65
End: 2020-03-12
Attending: INTERNAL MEDICINE
Payer: COMMERCIAL

## 2020-03-12 ENCOUNTER — HOSPITAL ENCOUNTER (OUTPATIENT)
Dept: MRI IMAGING | Age: 65
Discharge: HOME | End: 2020-03-12
Attending: INTERNAL MEDICINE
Payer: COMMERCIAL

## 2020-03-12 DIAGNOSIS — R93.89 ABNORMAL FINDING ON RADIOLOGY EXAM: ICD-10-CM

## 2020-03-12 PROCEDURE — A9581 GADOXETATE DISODIUM INJ: HCPCS | Performed by: INTERNAL MEDICINE

## 2020-03-12 PROCEDURE — 74183 MRI ABD W/O CNTR FLWD CNTR: CPT | Performed by: INTERNAL MEDICINE

## 2020-03-17 ENCOUNTER — TELEPHONE (OUTPATIENT)
Dept: NEPHROLOGY | Facility: CLINIC | Age: 65
End: 2020-03-17

## 2020-03-17 NOTE — TELEPHONE ENCOUNTER
Contacted pt and notified her of Dr. Tiwari Foot message below. She states symptoms are improving so she will continue to monitor and will call office back if symptoms worsen so she can get general surgery's info.

## 2020-03-17 NOTE — TELEPHONE ENCOUNTER
The liver is not the cause of her right upper quadrant pain. I think her symptoms are more muscular in nature. When I saw her last visit she said the symptoms were resolving. If they have no further work-up is needed.   If they have worsened then refer t

## 2020-03-27 RX ORDER — DULAGLUTIDE 1.5 MG/.5ML
INJECTION, SOLUTION SUBCUTANEOUS
Qty: 2 ML | Refills: 0 | Status: SHIPPED | OUTPATIENT
Start: 2020-03-27 | End: 2020-04-23

## 2020-03-30 ENCOUNTER — VIRTUAL PHONE E/M (OUTPATIENT)
Dept: ENDOCRINOLOGY CLINIC | Facility: CLINIC | Age: 65
End: 2020-03-30

## 2020-03-30 ENCOUNTER — TELEPHONE (OUTPATIENT)
Dept: ENDOCRINOLOGY CLINIC | Facility: CLINIC | Age: 65
End: 2020-03-30

## 2020-03-30 DIAGNOSIS — E03.9 HYPOTHYROIDISM, UNSPECIFIED TYPE: Primary | ICD-10-CM

## 2020-03-30 DIAGNOSIS — E78.5 DYSLIPIDEMIA: ICD-10-CM

## 2020-03-30 DIAGNOSIS — E11.69 TYPE 2 DIABETES MELLITUS WITH OTHER SPECIFIED COMPLICATION, WITHOUT LONG-TERM CURRENT USE OF INSULIN (HCC): ICD-10-CM

## 2020-03-30 DIAGNOSIS — E11.9 TYPE 2 DIABETES MELLITUS WITHOUT COMPLICATION, WITHOUT LONG-TERM CURRENT USE OF INSULIN (HCC): ICD-10-CM

## 2020-03-30 DIAGNOSIS — E55.9 VITAMIN D DEFICIENCY: ICD-10-CM

## 2020-03-30 PROCEDURE — 99213 OFFICE O/P EST LOW 20 MIN: CPT | Performed by: INTERNAL MEDICINE

## 2020-03-30 NOTE — TELEPHONE ENCOUNTER
Virtual/Telephone Check-In    Zechariah Faith verbally consents to a Virtual/Telephone Check-In service on 03/30/20. Patient understands and accepts financial responsibility for any deductible, co-insurance and/or co-pays associated with this service.     D Dyslipidemia  LDL elevated  Sh states that she had not been compliant with statin but is compliant now  Low fat diet  Will repeat d LDL in a few weeks    4.  Type 2 DM:  - She is doing well on trulicity  - CPM, no SE  - Encouraged to check BG a few times a

## 2020-04-13 ENCOUNTER — VIRTUAL PHONE E/M (OUTPATIENT)
Dept: SURGERY | Facility: CLINIC | Age: 65
End: 2020-04-13

## 2020-04-13 DIAGNOSIS — R63.2 BINGE EATING: ICD-10-CM

## 2020-04-13 DIAGNOSIS — E78.00 HYPERCHOLESTEROLEMIA: Primary | ICD-10-CM

## 2020-04-13 DIAGNOSIS — Z51.81 ENCOUNTER FOR THERAPEUTIC DRUG MONITORING: ICD-10-CM

## 2020-04-13 DIAGNOSIS — E66.01 MORBID OBESITY WITH BMI OF 50.0-59.9, ADULT (HCC): ICD-10-CM

## 2020-04-13 DIAGNOSIS — I10 ESSENTIAL HYPERTENSION: ICD-10-CM

## 2020-04-13 PROCEDURE — 99214 OFFICE O/P EST MOD 30 MIN: CPT | Performed by: INTERNAL MEDICINE

## 2020-04-13 RX ORDER — PHENTERMINE HYDROCHLORIDE 15 MG/1
15 CAPSULE ORAL EVERY MORNING
Qty: 30 CAPSULE | Refills: 2 | Status: SHIPPED | OUTPATIENT
Start: 2020-04-13 | End: 2020-06-16

## 2020-04-13 NOTE — PROGRESS NOTES
Virtual Telephone Check-In    Lisette Mcdaniels verbally consents to a Virtual/Telephone Check-In visit on 04/13/20. Patient understands and accepts financial responsibility for any deductible, co-insurance and/or co-pays associated with this service.     D

## 2020-04-22 DIAGNOSIS — E11.9 TYPE 2 DIABETES MELLITUS WITHOUT RETINOPATHY (HCC): ICD-10-CM

## 2020-04-23 DIAGNOSIS — E11.9 TYPE 2 DIABETES MELLITUS WITHOUT RETINOPATHY (HCC): ICD-10-CM

## 2020-04-24 DIAGNOSIS — E11.9 TYPE 2 DIABETES MELLITUS WITHOUT RETINOPATHY (HCC): ICD-10-CM

## 2020-04-26 ENCOUNTER — TELEPHONE (OUTPATIENT)
Dept: ENDOCRINOLOGY CLINIC | Facility: CLINIC | Age: 65
End: 2020-04-26

## 2020-04-26 ENCOUNTER — APPOINTMENT (OUTPATIENT)
Dept: LAB | Facility: HOSPITAL | Age: 65
End: 2020-04-26
Attending: INTERNAL MEDICINE
Payer: COMMERCIAL

## 2020-04-26 DIAGNOSIS — E11.9 TYPE 2 DIABETES MELLITUS WITHOUT COMPLICATION, WITHOUT LONG-TERM CURRENT USE OF INSULIN (HCC): ICD-10-CM

## 2020-04-26 DIAGNOSIS — E55.9 VITAMIN D DEFICIENCY: ICD-10-CM

## 2020-04-26 DIAGNOSIS — E78.00 PURE HYPERCHOLESTEROLEMIA: ICD-10-CM

## 2020-04-26 DIAGNOSIS — E78.5 DYSLIPIDEMIA: ICD-10-CM

## 2020-04-26 DIAGNOSIS — E03.9 HYPOTHYROIDISM, UNSPECIFIED TYPE: Primary | ICD-10-CM

## 2020-04-26 DIAGNOSIS — E03.9 HYPOTHYROIDISM, UNSPECIFIED TYPE: ICD-10-CM

## 2020-04-26 PROCEDURE — 83036 HEMOGLOBIN GLYCOSYLATED A1C: CPT

## 2020-04-26 PROCEDURE — 82306 VITAMIN D 25 HYDROXY: CPT

## 2020-04-26 PROCEDURE — 80061 LIPID PANEL: CPT

## 2020-04-26 PROCEDURE — 82570 ASSAY OF URINE CREATININE: CPT

## 2020-04-26 PROCEDURE — 36415 COLL VENOUS BLD VENIPUNCTURE: CPT

## 2020-04-26 PROCEDURE — 84443 ASSAY THYROID STIM HORMONE: CPT

## 2020-04-26 PROCEDURE — 80076 HEPATIC FUNCTION PANEL: CPT

## 2020-04-26 PROCEDURE — 84439 ASSAY OF FREE THYROXINE: CPT

## 2020-04-26 PROCEDURE — 83721 ASSAY OF BLOOD LIPOPROTEIN: CPT

## 2020-04-26 PROCEDURE — 82043 UR ALBUMIN QUANTITATIVE: CPT

## 2020-04-26 NOTE — TELEPHONE ENCOUNTER
100 + 88 mcg daily is current dose  Labs indicate need for dose decrease  Decrease to 175 mcg daily  TSH and Free T4 in 6 -8 weeks  Thanks

## 2020-04-27 RX ORDER — LEVOTHYROXINE SODIUM 175 UG/1
175 TABLET ORAL
Qty: 30 TABLET | Refills: 2 | Status: SHIPPED | OUTPATIENT
Start: 2020-04-27 | End: 2020-08-03

## 2020-04-27 NOTE — TELEPHONE ENCOUNTER
Spoke to patient and relayed Dr. Tae Solomon message as shown below. Patient educated on new dose of rx and informed to have repeat labs in 6-8 weeks. Patient verbalized understanding and had no further questions at this time.  Labs entered and rx sent to

## 2020-04-29 ENCOUNTER — TELEPHONE (OUTPATIENT)
Dept: ENDOCRINOLOGY CLINIC | Facility: CLINIC | Age: 65
End: 2020-04-29

## 2020-05-01 ENCOUNTER — TELEPHONE (OUTPATIENT)
Dept: NEPHROLOGY | Facility: CLINIC | Age: 65
End: 2020-05-01

## 2020-05-01 DIAGNOSIS — R79.89 LOW VITAMIN D LEVEL: ICD-10-CM

## 2020-05-01 RX ORDER — ERGOCALCIFEROL 1.25 MG/1
CAPSULE ORAL
Qty: 4 CAPSULE | Refills: 0 | Status: SHIPPED | OUTPATIENT
Start: 2020-05-01 | End: 2020-05-08

## 2020-05-01 RX ORDER — METFORMIN HYDROCHLORIDE 500 MG/1
500 TABLET, EXTENDED RELEASE ORAL
Qty: 60 TABLET | Refills: 3 | Status: SHIPPED | OUTPATIENT
Start: 2020-05-01 | End: 2021-06-07

## 2020-05-01 NOTE — TELEPHONE ENCOUNTER
Patient contacted. Advised of prescriptions sent to [facility]. Encounter routed to Dr. Chino Aiken to inform her.

## 2020-05-01 NOTE — TELEPHONE ENCOUNTER
Okay to refill metformin 500 mg, 1 twice daily, #60, refills 3. But she really should also let Dr. Doris العلي know. Okay to finish up the vitamin D x4 more weeks.

## 2020-05-01 NOTE — TELEPHONE ENCOUNTER
Pt requesting refill: Pt also states her blood sugars have been high and requesting to be prescribed metformin.  Please call [phone]        Current Outpatient Medications:     •  ergocalciferol 1.25 MG (60892 UT) Oral Cap, TAKE ONE CAPSULE BY MOUTH ONC

## 2020-05-01 NOTE — TELEPHONE ENCOUNTER
Dr. Ayana Hammond please advise--Ergocalciferol was only ordered for 8 week and patient was given #12 pills. Should not continue taking this medication. Is this correct? Also patient had lab work done and states blood sugar was elevated.  She also has been checki

## 2020-05-07 ENCOUNTER — TELEPHONE (OUTPATIENT)
Dept: NEPHROLOGY | Facility: CLINIC | Age: 65
End: 2020-05-07

## 2020-05-07 DIAGNOSIS — E11.9 DIABETES MELLITUS WITHOUT COMPLICATION (HCC): Primary | ICD-10-CM

## 2020-05-08 ENCOUNTER — TELEMEDICINE (OUTPATIENT)
Dept: ENDOCRINOLOGY CLINIC | Facility: CLINIC | Age: 65
End: 2020-05-08

## 2020-05-08 DIAGNOSIS — E03.9 HYPOTHYROIDISM, UNSPECIFIED TYPE: Primary | ICD-10-CM

## 2020-05-08 DIAGNOSIS — E11.9 TYPE 2 DIABETES MELLITUS WITHOUT COMPLICATION, WITHOUT LONG-TERM CURRENT USE OF INSULIN (HCC): ICD-10-CM

## 2020-05-08 DIAGNOSIS — E78.5 DYSLIPIDEMIA: ICD-10-CM

## 2020-05-08 DIAGNOSIS — R79.89 LOW VITAMIN D LEVEL: ICD-10-CM

## 2020-05-08 PROCEDURE — 99214 OFFICE O/P EST MOD 30 MIN: CPT | Performed by: INTERNAL MEDICINE

## 2020-05-08 RX ORDER — ERGOCALCIFEROL 1.25 MG/1
CAPSULE ORAL
Qty: 6 CAPSULE | Refills: 0 | Status: SHIPPED | OUTPATIENT
Start: 2020-05-08 | End: 2020-09-28

## 2020-05-08 NOTE — PROGRESS NOTES
Telehealth outside of [address] Verbal Consent   I conducted a telehealth visit with Alma English today, 05/08/20, which was completed using two-way, real-time interactive audio and video communication.  This has been done in good tawanna to rei no  History of Nephropathy: no     ASSOCIATED COMPLICATIONS:   HTN: yes  Hyperlipidemia: yes  Coronary Artery Disease:  no  Cerebrovascular Disease: no        HOME GLUCOSE READINGS:   Fasting and pre dinner 130-150 prior to starting MTF  Since starting MTF PANTOPRAZOLE SODIUM 40 MG Oral Tab EC TAKE 1 TABLET(40 MG) BY MOUTH EVERY MORNING BEFORE BREAKFAST 30 tablet 5   • LISINOPRIL-HYDROCHLOROTHIAZIDE 20-25 MG Oral Tab TAKE 1 TABLET BY MOUTH DAILY 90 tablet 1   • METOPROLOL SUCCINATE ER 25 MG Oral Tablet 24 Hr weakness  Genito-Urinary: Negative for: dysuria, frequency  Psychiatric: Negative for:  depression, anxiety  Hematology/Lymphatics: Negative for: bruising, lower extremity edema  Endocrine: Negative for: polyuria, polydypsia  Skin: Negative for: rash, blis potential side effects of statins including muscle and liver injury.   3. Hypothyroidism  Dose was decreased recently over the phone due to TSH being low  C/w LT 4 175 mcg daily  TSH and Free t4 in 6-8 weeks, call for results  Discussed administration and c

## 2020-05-21 NOTE — PROGRESS NOTES
Virtual/Telephone Check-In     Khurram Howe verbally consents to a Virtual/Telephone Check-In service on 03/30/20.   Patient understands and accepts financial responsibility for any deductible, co-insurance and/or co-pays associated with this servic weeks     3. Dyslipidemia  LDL elevated  Sh states that she had not been compliant with statin but is compliant now  Low fat diet  Will repeat d LDL in a few weeks     4.  Type 2 DM:  - She is doing well on trulicity  - CPM, no SE  - Encouraged to check BG

## 2020-06-16 ENCOUNTER — OFFICE VISIT (OUTPATIENT)
Dept: SURGERY | Facility: CLINIC | Age: 65
End: 2020-06-16

## 2020-06-16 VITALS
HEIGHT: 59 IN | WEIGHT: 256 LBS | BODY MASS INDEX: 51.61 KG/M2 | DIASTOLIC BLOOD PRESSURE: 80 MMHG | SYSTOLIC BLOOD PRESSURE: 130 MMHG

## 2020-06-16 DIAGNOSIS — E11.9 TYPE 2 DIABETES MELLITUS WITHOUT RETINOPATHY (HCC): Primary | ICD-10-CM

## 2020-06-16 DIAGNOSIS — R63.2 INCREASED APPETITE: ICD-10-CM

## 2020-06-16 DIAGNOSIS — E66.01 MORBID OBESITY WITH BMI OF 50.0-59.9, ADULT (HCC): ICD-10-CM

## 2020-06-16 DIAGNOSIS — G47.33 OSA (OBSTRUCTIVE SLEEP APNEA): ICD-10-CM

## 2020-06-16 DIAGNOSIS — R63.2 BINGE EATING: ICD-10-CM

## 2020-06-16 DIAGNOSIS — F43.9 STRESS: ICD-10-CM

## 2020-06-16 DIAGNOSIS — Z51.81 ENCOUNTER FOR THERAPEUTIC DRUG MONITORING: ICD-10-CM

## 2020-06-16 PROCEDURE — 99214 OFFICE O/P EST MOD 30 MIN: CPT | Performed by: INTERNAL MEDICINE

## 2020-06-16 RX ORDER — PHENTERMINE HYDROCHLORIDE 15 MG/1
15 CAPSULE ORAL EVERY MORNING
Qty: 30 CAPSULE | Refills: 2 | Status: SHIPPED | OUTPATIENT
Start: 2020-06-16 | End: 2020-09-28 | Stop reason: DRUGHIGH

## 2020-06-16 NOTE — PROGRESS NOTES
[facility]  [address] [facility] BARIATRIC AND WEIGHT LOSS CLINIC  [address] [place] [address]  Dept: [phone]         Patient:  Candido Goss  :      10/24/1955  MRN:      [id number]    Chief Complaint:  Patient presents with:   Follow - Up: Non- Oral Cap TAKE ONE CAPSULE BY MOUTH ONCE A MONTH 6 capsule 0   • metFORMIN HCl  MG Oral Tablet 24 Hr Take 1 tablet (500 mg total) by mouth 2 (two) times daily before meals.  60 tablet 3   • Levothyroxine Sodium 175 MCG Oral Tab Take 1 tablet (175 mcg t NEXT MONITOR) w/Device Does not apply Kit 1 each by In Vitro route daily.  Check blood sugar 1 time daily 1 kit 0   • MORENO MICROLET LANCETS Does not apply Misc Use 1 lancet daily to check blood sugar 100 each 1     Allergies:  Patient has no known allergie Outdoor occupation: Not Asked        Pt has a pacemaker: No        Pt has a defibrillator: No        Breast feeding: Not Asked        Reaction to local anesthetic: No         Service: Not Asked        Blood Transfusions: Not Asked        Caffeine C and Discussed  Eat breakfast, Eat 3 meals per day, Plan meals in advance, Read nutrition labels, Drink 64 oz of water per day, Maintain a daily food journal, No drinking 30 minutes before or after meals, Utlize portion control strategies to reduce calorie retinopathy (hcc)  (primary encounter diagnosis)  [place] (obstructive sleep apnea)  Binge eating  Increased appetite  Morbid obesity with bmi of 50.0-59.9, adult ([facility])  Encounter for therapeutic drug monitoring  Stress    PLAN   Given the patient's agewon

## 2020-06-22 ENCOUNTER — TELEPHONE (OUTPATIENT)
Dept: NEPHROLOGY | Facility: CLINIC | Age: 65
End: 2020-06-22

## 2020-06-22 NOTE — TELEPHONE ENCOUNTER
Pt states she has pain on right side of the abdomen that has been going on for the past couple of days.  Please call [phone]

## 2020-06-22 NOTE — TELEPHONE ENCOUNTER
I called Ramy Sow and informed her of Dr. Jenkins Members message below. Advised if pain is severe needs to go to the ER today. Patient states she does not feel the pain is severe. She wants to book an appt with Dr. Carlos Trevino.  Booked appt Wednesday afternoon soonest

## 2020-06-22 NOTE — TELEPHONE ENCOUNTER
Patient's right upper abdominal pain has returned. Would you like to refer her to general surgery? See triage information below. Thanks. Called and spoke with the patient to triage.    Devi Nolan states she has been having intermittent right upper abdominal pa

## 2020-06-24 ENCOUNTER — OFFICE VISIT (OUTPATIENT)
Dept: NEPHROLOGY | Facility: CLINIC | Age: 65
End: 2020-06-24

## 2020-06-24 VITALS
HEIGHT: 59 IN | DIASTOLIC BLOOD PRESSURE: 72 MMHG | WEIGHT: 257.19 LBS | HEART RATE: 98 BPM | BODY MASS INDEX: 51.85 KG/M2 | SYSTOLIC BLOOD PRESSURE: 115 MMHG

## 2020-06-24 DIAGNOSIS — K63.5 POLYP OF COLON, UNSPECIFIED PART OF COLON, UNSPECIFIED TYPE: ICD-10-CM

## 2020-06-24 DIAGNOSIS — E11.9 DIABETES MELLITUS WITHOUT COMPLICATION (HCC): ICD-10-CM

## 2020-06-24 DIAGNOSIS — T14.8XXA MUSCLE STRAIN: Primary | ICD-10-CM

## 2020-06-24 DIAGNOSIS — I10 ESSENTIAL HYPERTENSION: ICD-10-CM

## 2020-06-24 PROCEDURE — 99214 OFFICE O/P EST MOD 30 MIN: CPT | Performed by: INTERNAL MEDICINE

## 2020-06-24 RX ORDER — CYCLOBENZAPRINE HCL 5 MG
TABLET ORAL 3 TIMES DAILY PRN
Qty: 30 TABLET | Refills: 0 | Status: SHIPPED | OUTPATIENT
Start: 2020-06-24 | End: 2020-09-28

## 2020-06-24 NOTE — PROGRESS NOTES
[facility], [facility]  Nephrology Daily Progress Note    Zechariah Faith  [id number]  60year old  Patient presents with:  Abdominal Pain      HPI:   Zechariah Faith is a 60year old female.   57-year-old female with a history of hypertension, hypercholesterolemia, pruritus and rash  Musculoskeletal:  Negative for bone/joint symptoms  Neurological:  Negative for gait disturbance  Psychiatric:  Negative for inappropriate interaction and psychiatric symptoms  Respiratory:  Negative for cough, dyspnea and wheezing CREATSERUM 0.87  --    CA 9.2  --    ALB 3.3* 3.3*   BUNCREA 25.3*  --    ANIONGAP 9  --    OSMOCALC 297*  --    GFRNAA 72  --    GFRAA 83  --        Imaging        Medications:    Current Outpatient Medications:   •  cyclobenzaprine 5 MG Oral Tab, Take (VITAMIN B-12 OR), Take 1 tablet by mouth daily. , Disp: , Rfl:   •  Rosuvastatin Calcium 20 MG Oral Tab, TAKE 1 TABLET BY MOUTH DAILY (Patient taking differently: Take 20 mg by mouth nightly.  TAKE 1 TABLET BY MOUTH DAILY ), Disp: 90 tablet, Rfl: 1  •  Heather for therapeutic drug monitoring     Cough     Type 2 diabetes mellitus without retinopathy ([facility] [address]     Age-related nuclear cataract of both eyes     Acute chest pain     Azotemia     Hyperglycemia     Increased appetite     Stress      Again the symptoms john

## 2020-06-24 NOTE — PATIENT INSTRUCTIONS
Let me know if physical therapy does not help your pain. See Dr. Josefina Mcwilliams for your follow-up colonoscopy.

## 2020-06-29 ENCOUNTER — TELEPHONE (OUTPATIENT)
Dept: OPHTHALMOLOGY | Facility: CLINIC | Age: 65
End: 2020-06-29

## 2020-06-29 NOTE — TELEPHONE ENCOUNTER
Pt called stating pt has 2 rx. Distance and reading. Pt requesting one rx. Together. Pt is at [place] [address] now. Fax to [phone]. Pt is waiting.   Call pt

## 2020-08-03 RX ORDER — LEVOTHYROXINE SODIUM 175 UG/1
TABLET ORAL
Qty: 30 TABLET | Refills: 2 | Status: SHIPPED | OUTPATIENT
Start: 2020-08-03 | End: 2020-09-03

## 2020-08-03 RX ORDER — LISINOPRIL AND HYDROCHLOROTHIAZIDE 25; 20 MG/1; MG/1
1 TABLET ORAL DAILY
Qty: 90 TABLET | Refills: 1 | Status: SHIPPED | OUTPATIENT
Start: 2020-08-03 | End: 2021-02-10

## 2020-08-03 RX ORDER — METOPROLOL SUCCINATE 25 MG/1
TABLET, EXTENDED RELEASE ORAL
Qty: 90 TABLET | Refills: 1 | Status: SHIPPED | OUTPATIENT
Start: 2020-08-03 | End: 2021-03-19

## 2020-08-03 NOTE — TELEPHONE ENCOUNTER
LOV 5/8/20 RTC 5-6 mos  No F/U     Pt is due for labs - [facility] reminder sent

## 2020-08-04 ENCOUNTER — TELEPHONE (OUTPATIENT)
Dept: NEPHROLOGY | Facility: CLINIC | Age: 65
End: 2020-08-04

## 2020-08-04 RX ORDER — AZITHROMYCIN 250 MG/1
TABLET, FILM COATED ORAL
Qty: 6 TABLET | Refills: 0 | Status: SHIPPED | OUTPATIENT
Start: 2020-08-04 | End: 2020-08-09

## 2020-08-04 NOTE — TELEPHONE ENCOUNTER
Spoke to patient. She has had a cough for the last week. Productive but no other symptoms. She states she gets this every year. Denies fever, no chest discomfort no shortness of breath. Please advise.

## 2020-08-04 NOTE — TELEPHONE ENCOUNTER
Patient contacted. Prescription e-scripted to pharmacy on file. Patient aware to call if not better.

## 2020-09-03 ENCOUNTER — TELEPHONE (OUTPATIENT)
Dept: NEPHROLOGY | Facility: CLINIC | Age: 65
End: 2020-09-03

## 2020-09-03 DIAGNOSIS — Z00.00 ANNUAL PHYSICAL EXAM: Primary | ICD-10-CM

## 2020-09-03 DIAGNOSIS — Z12.31 VISIT FOR SCREENING MAMMOGRAM: ICD-10-CM

## 2020-09-03 RX ORDER — LEVOTHYROXINE SODIUM 175 UG/1
TABLET ORAL
Qty: 30 TABLET | Refills: 2 | Status: SHIPPED | OUTPATIENT
Start: 2020-09-03 | End: 2020-12-23

## 2020-09-03 RX ORDER — ROSUVASTATIN CALCIUM 20 MG/1
TABLET, COATED ORAL
Qty: 90 TABLET | Refills: 1 | Status: SHIPPED | OUTPATIENT
Start: 2020-09-03 | End: 2021-10-18

## 2020-09-03 NOTE — TELEPHONE ENCOUNTER
Refill request. Last office visit 6/24/2020. last lipid panel 4/26/2020. Return to clinic. Pended and Routed to  Dr. Pratibha Sanchez.
wrist pain/injury

## 2020-09-03 NOTE — TELEPHONE ENCOUNTER
Encounter routed to Dr. Vic Hubbard. Referral pended and routed to Dr. Vic Hubbard to sign. Also needs mammogram order.

## 2020-09-04 NOTE — TELEPHONE ENCOUNTER
Order for mammogram entered in system. Referral signed and sent to [facility] for insurance authorization. (Authorized) Patient contacted and advised. Scheduling phone# provided.

## 2020-09-22 ENCOUNTER — HOSPITAL ENCOUNTER (OUTPATIENT)
Dept: MAMMOGRAPHY | Facility: HOSPITAL | Age: 65
Discharge: HOME | End: 2020-09-22
Attending: INTERNAL MEDICINE
Payer: COMMERCIAL

## 2020-09-22 DIAGNOSIS — Z12.31 VISIT FOR SCREENING MAMMOGRAM: ICD-10-CM

## 2020-09-22 PROCEDURE — 77067 SCR MAMMO BI INCL CAD: CPT | Performed by: INTERNAL MEDICINE

## 2020-09-22 PROCEDURE — 77063 BREAST TOMOSYNTHESIS BI: CPT | Performed by: INTERNAL MEDICINE

## 2020-09-28 ENCOUNTER — OFFICE VISIT (OUTPATIENT)
Dept: SURGERY | Facility: CLINIC | Age: 65
End: 2020-09-28

## 2020-09-28 VITALS
OXYGEN SATURATION: 98 % | SYSTOLIC BLOOD PRESSURE: 124 MMHG | DIASTOLIC BLOOD PRESSURE: 80 MMHG | HEART RATE: 94 BPM | BODY MASS INDEX: 51.97 KG/M2 | HEIGHT: 59 IN | WEIGHT: 257.81 LBS

## 2020-09-28 DIAGNOSIS — F43.9 STRESS: ICD-10-CM

## 2020-09-28 DIAGNOSIS — E78.00 PURE HYPERCHOLESTEROLEMIA: ICD-10-CM

## 2020-09-28 DIAGNOSIS — I10 ESSENTIAL HYPERTENSION: ICD-10-CM

## 2020-09-28 DIAGNOSIS — G47.33 OSA (OBSTRUCTIVE SLEEP APNEA): ICD-10-CM

## 2020-09-28 DIAGNOSIS — E11.9 TYPE 2 DIABETES MELLITUS WITHOUT RETINOPATHY (HCC): Primary | ICD-10-CM

## 2020-09-28 DIAGNOSIS — E66.01 MORBID OBESITY WITH BMI OF 50.0-59.9, ADULT (HCC): ICD-10-CM

## 2020-09-28 DIAGNOSIS — R63.2 INCREASED APPETITE: ICD-10-CM

## 2020-09-28 DIAGNOSIS — R63.2 BINGE EATING: ICD-10-CM

## 2020-09-28 DIAGNOSIS — Z51.81 ENCOUNTER FOR THERAPEUTIC DRUG MONITORING: ICD-10-CM

## 2020-09-28 PROCEDURE — 3079F DIAST BP 80-89 MM HG: CPT | Performed by: INTERNAL MEDICINE

## 2020-09-28 PROCEDURE — 3074F SYST BP LT 130 MM HG: CPT | Performed by: INTERNAL MEDICINE

## 2020-09-28 PROCEDURE — 3008F BODY MASS INDEX DOCD: CPT | Performed by: INTERNAL MEDICINE

## 2020-09-28 PROCEDURE — 99214 OFFICE O/P EST MOD 30 MIN: CPT | Performed by: INTERNAL MEDICINE

## 2020-09-28 RX ORDER — PHENTERMINE HYDROCHLORIDE 30 MG/1
30 CAPSULE ORAL EVERY MORNING
Qty: 30 CAPSULE | Refills: 1 | Status: SHIPPED | OUTPATIENT
Start: 2020-09-28 | End: 2020-12-07

## 2020-09-28 NOTE — PROGRESS NOTES
[facility] BARIATRIC AND WEIGHT LOSS CLINIC  [address] [address]  Dept: [phone]         Patient:  Amanuel Clancy  :      10/24/1955  MRN:      [id number]    Chief Complaint:  Patient presents with:   Follow - Up  Sachin Galeas ROSUVASTATIN CALCIUM 20 MG Oral Tab TAKE 1 TABLET BY MOUTH DAILY 90 tablet 1   • LEVOTHYROXINE SODIUM 175 MCG Oral Tab TAKE 1 TABLET(175 MCG) BY MOUTH BEFORE BREAKFAST 30 tablet 2   • METOPROLOL SUCCINATE ER 25 MG Oral Tablet 24 Hr TAKE 1 TABLET(25 MG) BY Activity      Alcohol use: Yes        Comment: social - monthly      Drug use: No      Sexual activity: Yes        Partners: Male        Comment: none    Lifestyle      Physical activity        Days per week: Not on file        Minutes per session: Not on Heart Disease Mother    • Diabetes Neg    • Glaucoma Neg    • Retinal detachment Neg    • Macular degeneration Neg    • Cancer Neg        Food Journal  · Reviewed and Discussed:       · Patient has a Food Journal?: no   · Patient is reading nutrition label masses,  no organomegaly  Extremities: extremities normal, atraumatic, no cyanosis or edema  Pulses: 2+ and symmetric  Skin: Skin color, texture, turgor normal. No rashes or lesions    ASSESSMENT     HYPERTENSION:  The patient's blood pressure has been wel 07:48 AM    HDL 41 04/26/2020 07:48 AM    TRIG 180 (H) 04/26/2020 07:48 AM    VLDL 36 (H) 04/26/2020 07:48 AM     Goals for next month:  1. Keep a food log using MFP  2. Drink 48-64 ounces of water per day. No fruit juices or regular soda.   3. Increase aer

## 2020-10-27 ENCOUNTER — TELEPHONE (OUTPATIENT)
Dept: NEPHROLOGY | Facility: CLINIC | Age: 65
End: 2020-10-27

## 2020-10-27 DIAGNOSIS — G47.33 OSA (OBSTRUCTIVE SLEEP APNEA): Primary | ICD-10-CM

## 2020-10-27 NOTE — TELEPHONE ENCOUNTER
Referral order pending for CPAP supplies. LOV 6/24/20. No F/U scheduled. Referral request received from E for the auto cpap and disposable filter packages for machine. Referral pended to provider. Will await authorization and then fax back form.  Pl

## 2020-12-07 ENCOUNTER — OFFICE VISIT (OUTPATIENT)
Dept: SURGERY | Facility: CLINIC | Age: 65
End: 2020-12-07

## 2020-12-07 VITALS
SYSTOLIC BLOOD PRESSURE: 128 MMHG | HEART RATE: 92 BPM | DIASTOLIC BLOOD PRESSURE: 74 MMHG | HEIGHT: 59 IN | WEIGHT: 257 LBS | BODY MASS INDEX: 51.81 KG/M2

## 2020-12-07 DIAGNOSIS — E66.01 MORBID OBESITY WITH BMI OF 50.0-59.9, ADULT (HCC): ICD-10-CM

## 2020-12-07 DIAGNOSIS — I10 ESSENTIAL HYPERTENSION: ICD-10-CM

## 2020-12-07 DIAGNOSIS — E11.9 TYPE 2 DIABETES MELLITUS WITHOUT RETINOPATHY (HCC): Primary | ICD-10-CM

## 2020-12-07 DIAGNOSIS — Z51.81 ENCOUNTER FOR THERAPEUTIC DRUG MONITORING: ICD-10-CM

## 2020-12-07 DIAGNOSIS — R63.2 BINGE EATING: ICD-10-CM

## 2020-12-07 DIAGNOSIS — G47.33 OSA (OBSTRUCTIVE SLEEP APNEA): ICD-10-CM

## 2020-12-07 DIAGNOSIS — E78.00 PURE HYPERCHOLESTEROLEMIA: ICD-10-CM

## 2020-12-07 PROCEDURE — 99214 OFFICE O/P EST MOD 30 MIN: CPT | Performed by: INTERNAL MEDICINE

## 2020-12-07 PROCEDURE — 3074F SYST BP LT 130 MM HG: CPT | Performed by: INTERNAL MEDICINE

## 2020-12-07 PROCEDURE — 3008F BODY MASS INDEX DOCD: CPT | Performed by: INTERNAL MEDICINE

## 2020-12-07 PROCEDURE — 3078F DIAST BP <80 MM HG: CPT | Performed by: INTERNAL MEDICINE

## 2020-12-07 RX ORDER — PHENTERMINE HYDROCHLORIDE 30 MG/1
30 CAPSULE ORAL EVERY MORNING
Qty: 30 CAPSULE | Refills: 1 | Status: SHIPPED | OUTPATIENT
Start: 2020-12-07 | End: 2021-03-08 | Stop reason: DRUGHIGH

## 2020-12-07 NOTE — PROGRESS NOTES
[facility] BARIATRIC AND WEIGHT LOSS CLINIC  [address] [address]  Dept: [phone]         Patient:  Emelia Baca  :      10/24/1955  MRN:      [id number]    Chief Complaint:  Patient presents with:   Follow - Up  De Zhou 1-0.05 % External Cream Apply 1 Application topically 2 (two) times daily. 45 g 2   • Clobetasol Propionate 0.05 % External Ointment Apply 1 Application topically 2 (two) times daily.  As needed for itching 15 g 1   • Lisdexamfetamine Dimesylate (VYVANSE) 7 History      Occupation: office    Social Needs      Financial resource strain: Not on file      Food insecurity        Worry: Not on file        Inability: Not on file      Transportation needs        Medical: Not on file        Non-medical: Not on file Exercise: No        Bike Helmet: Not Asked        Seat Belt: Not Asked        Self-Exams: Not Asked    Social History Narrative      Not on file    Surgical History:    Past Surgical History:   Procedure Laterality Date   • COLONOSCOPY  11/7/2011    per NG negative  Cardiovascular: negative  Gastrointestinal: negative  Musculoskeletal:positive for back pain  Neurological: negative  Behavioral/Psych: negative  Endocrine: negative  All other systems were reviewed and are negative    Physical Exam:   General ap consideration for bariatric surgery will be discussed at upcoming clinic visits. HYPERTENSION: Blood pressure stable on the above medications. No interval change in antihypertensive medication.      ROGELIO: Patient was instructed to continue wearing the

## 2020-12-16 ENCOUNTER — TELEPHONE (OUTPATIENT)
Dept: NEPHROLOGY | Facility: CLINIC | Age: 65
End: 2020-12-16

## 2020-12-16 DIAGNOSIS — R79.89 LOW VITAMIN D LEVEL: ICD-10-CM

## 2020-12-16 DIAGNOSIS — Z00.00 ANNUAL PHYSICAL EXAM: Primary | ICD-10-CM

## 2020-12-16 NOTE — TELEPHONE ENCOUNTER
Pt asking if she can orders for blood work - has appt for 1/11/21- to check vitamin D and general blood work - she is having weakness and hair loss -

## 2020-12-21 ENCOUNTER — TELEPHONE (OUTPATIENT)
Dept: ENDOCRINOLOGY CLINIC | Facility: CLINIC | Age: 65
End: 2020-12-21

## 2020-12-21 ENCOUNTER — LAB ENCOUNTER (OUTPATIENT)
Dept: LAB | Age: 65
End: 2020-12-21
Attending: INTERNAL MEDICINE
Payer: COMMERCIAL

## 2020-12-21 DIAGNOSIS — E03.9 HYPOTHYROIDISM, UNSPECIFIED TYPE: ICD-10-CM

## 2020-12-21 DIAGNOSIS — Z00.00 ANNUAL PHYSICAL EXAM: ICD-10-CM

## 2020-12-21 DIAGNOSIS — R79.89 LOW VITAMIN D LEVEL: ICD-10-CM

## 2020-12-21 PROCEDURE — 36415 COLL VENOUS BLD VENIPUNCTURE: CPT

## 2020-12-21 PROCEDURE — 84443 ASSAY THYROID STIM HORMONE: CPT

## 2020-12-21 PROCEDURE — 83036 HEMOGLOBIN GLYCOSYLATED A1C: CPT

## 2020-12-21 PROCEDURE — 80061 LIPID PANEL: CPT

## 2020-12-21 PROCEDURE — 84439 ASSAY OF FREE THYROXINE: CPT | Performed by: INTERNAL MEDICINE

## 2020-12-21 PROCEDURE — 80053 COMPREHEN METABOLIC PANEL: CPT

## 2020-12-21 PROCEDURE — 82306 VITAMIN D 25 HYDROXY: CPT

## 2020-12-21 PROCEDURE — 85025 COMPLETE CBC W/AUTO DIFF WBC: CPT

## 2020-12-21 NOTE — TELEPHONE ENCOUNTER
Received labs  She is due for an apt   Can do a VV this month or an in person visit in Jan 2021  Okay to overcherri if needed  Thanks

## 2020-12-23 ENCOUNTER — TELEMEDICINE (OUTPATIENT)
Dept: ENDOCRINOLOGY CLINIC | Facility: CLINIC | Age: 65
End: 2020-12-23

## 2020-12-23 DIAGNOSIS — E78.5 DYSLIPIDEMIA: ICD-10-CM

## 2020-12-23 DIAGNOSIS — E11.69 TYPE 2 DIABETES MELLITUS WITH OTHER SPECIFIED COMPLICATION, UNSPECIFIED WHETHER LONG TERM INSULIN USE (HCC): ICD-10-CM

## 2020-12-23 DIAGNOSIS — E03.9 HYPOTHYROIDISM, UNSPECIFIED TYPE: Primary | ICD-10-CM

## 2020-12-23 PROCEDURE — 99214 OFFICE O/P EST MOD 30 MIN: CPT | Performed by: INTERNAL MEDICINE

## 2020-12-23 RX ORDER — DULAGLUTIDE 1.5 MG/.5ML
1.5 INJECTION, SOLUTION SUBCUTANEOUS WEEKLY
Qty: 6 ML | Refills: 0 | Status: SHIPPED | OUTPATIENT
Start: 2020-12-23 | End: 2021-06-07

## 2020-12-23 RX ORDER — LEVOTHYROXINE SODIUM 0.2 MG/1
TABLET ORAL
Qty: 26 TABLET | Refills: 0 | Status: SHIPPED | OUTPATIENT
Start: 2020-12-23 | End: 2021-03-19

## 2020-12-23 RX ORDER — LEVOTHYROXINE SODIUM 0.2 MG/1
TABLET ORAL
Qty: 24 TABLET | Refills: 0 | Status: SHIPPED | OUTPATIENT
Start: 2020-12-23 | End: 2020-12-23

## 2020-12-23 RX ORDER — LEVOTHYROXINE SODIUM 175 UG/1
TABLET ORAL
Qty: 60 TABLET | Refills: 0 | Status: SHIPPED | OUTPATIENT
Start: 2020-12-23 | End: 2021-04-12

## 2020-12-23 NOTE — PROGRESS NOTES
Telehealth outside of [address] Verbal Consent   I conducted a telehealth visit with Raul Menezes today, 12/23/20, which was completed using two-way, real-time interactive audio and video communication.  This has been done in good tawanna to rei no  History of Nephropathy: no     ASSOCIATED COMPLICATIONS:   HTN: yes  Hyperlipidemia: yes  Coronary Artery Disease:  no  Cerebrovascular Disease: no        HOME GLUCOSE READINGS:   Fasting and pre dinner 100-140  Stopped checking a few weeks ago, was ta Shampoo Apply to scalp 3 times per week. 240 mL 12   • Blood Glucose Monitoring Suppl (Clutch CONTOUR NEXT MONITOR) w/Device Does not apply Kit 1 each by In Vitro route daily.  Check blood sugar 1 time daily 1 kit 0   • MORENO MICROLET LANCETS Does not apply difficulty  Psychiatric:  oriented to time, self, and place  Extremities: no obvious extremity swelling, no lesions          DATA:     Pertinent data reviewed      ASSESSMENT AND PLAN:      1.  Type 2 DM: a1c is 6.8 %      Plan:  Discussed the pathogenesis, did not have any further questions.          No orders of the defined types were placed in this encounter.           Mei Casas MD                        Please note that the following visit was completed using two-way, real-time interactive audio and

## 2021-01-11 ENCOUNTER — OFFICE VISIT (OUTPATIENT)
Dept: NEPHROLOGY | Facility: CLINIC | Age: 66
End: 2021-01-11

## 2021-01-11 VITALS
DIASTOLIC BLOOD PRESSURE: 84 MMHG | TEMPERATURE: 99 F | SYSTOLIC BLOOD PRESSURE: 146 MMHG | WEIGHT: 263.19 LBS | BODY MASS INDEX: 53.06 KG/M2 | HEIGHT: 59 IN | HEART RATE: 103 BPM

## 2021-01-11 DIAGNOSIS — E11.9 DIABETES MELLITUS WITHOUT COMPLICATION (HCC): ICD-10-CM

## 2021-01-11 DIAGNOSIS — E66.9 OBESITY, UNSPECIFIED CLASSIFICATION, UNSPECIFIED OBESITY TYPE, UNSPECIFIED WHETHER SERIOUS COMORBIDITY PRESENT: ICD-10-CM

## 2021-01-11 DIAGNOSIS — T14.8XXA MUSCLE STRAIN: Primary | ICD-10-CM

## 2021-01-11 DIAGNOSIS — Z12.11 ENCOUNTER FOR SCREENING COLONOSCOPY: ICD-10-CM

## 2021-01-11 DIAGNOSIS — I10 ESSENTIAL HYPERTENSION: ICD-10-CM

## 2021-01-11 PROCEDURE — 3077F SYST BP >= 140 MM HG: CPT | Performed by: INTERNAL MEDICINE

## 2021-01-11 PROCEDURE — 3079F DIAST BP 80-89 MM HG: CPT | Performed by: INTERNAL MEDICINE

## 2021-01-11 PROCEDURE — 3008F BODY MASS INDEX DOCD: CPT | Performed by: INTERNAL MEDICINE

## 2021-01-11 PROCEDURE — 99214 OFFICE O/P EST MOD 30 MIN: CPT | Performed by: INTERNAL MEDICINE

## 2021-01-11 RX ORDER — FLUTICASONE PROPIONATE 50 MCG
2 SPRAY, SUSPENSION (ML) NASAL DAILY
Qty: 1 INHALER | Refills: 3 | Status: SHIPPED | OUTPATIENT
Start: 2021-01-11 | End: 2021-09-27 | Stop reason: ALTCHOICE

## 2021-01-12 NOTE — PROGRESS NOTES
[address]  Nephrology Daily Progress Note    Francine Manjarrez  [id number]  63year old  Patient presents with: Follow - Up: Would like to discuss lab work. Want to know if due for a screening colonoscopy, not diagnostic?       HPI:   Francine Manjarrez is SpO2 - - -   Weight 257 lbs - 263 lbs 3 oz   Height 59 - 59   BODY MASS INDEX - 53.16 -       VITALS: WEIGHT ONLY 11/9/2020 12/7/2020 1/11/2021   Weight 257 lbs 257 lbs 263 lbs 3 oz       Constitutional: appears well hydrated alert and responsive no acut Pen-injector, Inject 1.5 mg into the skin once a week.  INJECT 1.5 MG( 0.5 ML) UNDER THE SKIN EVERY 7 DAYS, Disp: 6 mL, Rfl: 0  •  Levothyroxine Sodium 200 MCG Oral Tab, TAKE 1 TABLET BY MOUTH 2 DAYS A WEEK, Disp: 26 tablet, Rfl: 0  •  Lisdexamfetamine Dime (hcc)    Patient Active Problem List:     Obesity     Hypothyroid     HTN (hypertension)     Pure hypercholesterolemia     Blepharitis     Dry eyes, bilateral     COPD (chronic obstructive pulmonary disease) (HCC)     Dandruff     Hypercholesterolemia

## 2021-01-12 NOTE — PATIENT INSTRUCTIONS
Resume Crestor on a daily basis. Repeat your liver and lipid panel in 1 month. Try physical therapy. Let me know if it is not helping.

## 2021-01-25 ENCOUNTER — TELEPHONE (OUTPATIENT)
Dept: PHYSICAL THERAPY | Facility: HOSPITAL | Age: 66
End: 2021-01-25

## 2021-01-27 ENCOUNTER — OFFICE VISIT (OUTPATIENT)
Dept: PHYSICAL THERAPY | Age: 66
End: 2021-01-27
Attending: INTERNAL MEDICINE
Payer: COMMERCIAL

## 2021-01-27 DIAGNOSIS — T14.8XXA MUSCLE STRAIN: ICD-10-CM

## 2021-01-27 PROCEDURE — 97161 PT EVAL LOW COMPLEX 20 MIN: CPT

## 2021-01-27 PROCEDURE — 97530 THERAPEUTIC ACTIVITIES: CPT

## 2021-01-27 NOTE — PROGRESS NOTES
LUMBAR SPINE EVALUATION:   Referring Physician: Dr. Miracle Lu  Date of Onset: 2020 Date of Service: 1/27/2021   Muscle strain (T14.8XXA)  PATIENT SUMMARY:   Sung Rodrigez is a 63year old y/o female who presents to therapy today with complaints of R uppe Physical Therapy is medically necessary to address the above impairments and reach functional goals.      In agreement with FOTO score and clinical rationale, this evaluation involved Low Complexity decision making due to 1-2 personal factors/comorbidities, chair and bed without R quadrant pain. 3. Pt will be able to bend over without any R quadrant pain. Frequency / Duration: Patient will be seen for 2x/week or a total of 8 visits over a 90 day period.   Treatment will include: Manual Therapy, Neuromusc

## 2021-02-02 ENCOUNTER — OFFICE VISIT (OUTPATIENT)
Dept: PHYSICAL THERAPY | Age: 66
End: 2021-02-02
Attending: INTERNAL MEDICINE
Payer: COMMERCIAL

## 2021-02-02 NOTE — PROGRESS NOTES
Pt came in today and stated she was in a lot of LBP due to shoveling snow over the weekend. She stated she did not think she could tolerate any therapy today and would rather cancel appt.   She was encouraged to try ice or heat and rest and followup with MD

## 2021-02-04 ENCOUNTER — OFFICE VISIT (OUTPATIENT)
Dept: PHYSICAL THERAPY | Age: 66
End: 2021-02-04
Attending: INTERNAL MEDICINE
Payer: COMMERCIAL

## 2021-02-04 DIAGNOSIS — T14.8XXA MUSCLE STRAIN: ICD-10-CM

## 2021-02-04 PROCEDURE — 97110 THERAPEUTIC EXERCISES: CPT

## 2021-02-04 NOTE — PROGRESS NOTES
Dx: Muscle strain (T14.8XXA)         Insurance (Authorized # of Visits):  [address] exp 4/19/21   POC visits: 6   Authorizing Physician: Dr. Dawit Whitehead  Next MD visit: none scheduled  Fall Risk: standard         Precautions:         Subjective: Pt states her

## 2021-02-09 ENCOUNTER — OFFICE VISIT (OUTPATIENT)
Dept: PHYSICAL THERAPY | Age: 66
End: 2021-02-09
Attending: INTERNAL MEDICINE
Payer: COMMERCIAL

## 2021-02-09 DIAGNOSIS — T14.8XXA MUSCLE STRAIN: ICD-10-CM

## 2021-02-09 PROCEDURE — 97110 THERAPEUTIC EXERCISES: CPT

## 2021-02-09 NOTE — PROGRESS NOTES
Dx: Muscle strain (T14.8XXA)         Insurance (Authorized # of Visits):  [address] exp 4/19/21   POC visits: 6   Authorizing Physician: Dr. Amilcar Montana MD visit: none scheduled  Fall Risk: standard         Precautions:         Subjective: Pt states she

## 2021-02-10 RX ORDER — LISINOPRIL AND HYDROCHLOROTHIAZIDE 25; 20 MG/1; MG/1
1 TABLET ORAL DAILY
Qty: 90 TABLET | Refills: 1 | Status: SHIPPED | OUTPATIENT
Start: 2021-02-10 | End: 2021-06-22

## 2021-02-11 ENCOUNTER — OFFICE VISIT (OUTPATIENT)
Dept: PHYSICAL THERAPY | Age: 66
End: 2021-02-11
Attending: INTERNAL MEDICINE
Payer: COMMERCIAL

## 2021-02-11 DIAGNOSIS — T14.8XXA MUSCLE STRAIN: ICD-10-CM

## 2021-02-11 PROCEDURE — 97110 THERAPEUTIC EXERCISES: CPT

## 2021-02-11 NOTE — PROGRESS NOTES
Dx: Muscle strain (T14.8XXA)         Insurance (Authorized # of Visits):  [email or web address] exp 4/19/21   POC visits: 6   Authorizing Physician: Dr. Joey Cotto  Next MD visit: none scheduled  Fall Risk: standard         Precautions:         Subjective: Pt states the

## 2021-02-16 ENCOUNTER — OFFICE VISIT (OUTPATIENT)
Dept: PHYSICAL THERAPY | Age: 66
End: 2021-02-16
Attending: INTERNAL MEDICINE
Payer: COMMERCIAL

## 2021-02-16 DIAGNOSIS — T14.8XXA MUSCLE STRAIN: ICD-10-CM

## 2021-02-16 PROCEDURE — 97140 MANUAL THERAPY 1/> REGIONS: CPT

## 2021-02-16 NOTE — PROGRESS NOTES
Dx: Muscle strain (T14.8XXA)         Insurance (Authorized # of Visits):  [address] exp 4/19/21   POC visits: 6   Authorizing Physician: Dr. Milvia Montana MD visit: none scheduled  Fall Risk: standard         Precautions:         Subjective: Pt states she Treatment Time: 40 min

## 2021-02-18 ENCOUNTER — OFFICE VISIT (OUTPATIENT)
Dept: PHYSICAL THERAPY | Age: 66
End: 2021-02-18
Attending: INTERNAL MEDICINE
Payer: COMMERCIAL

## 2021-02-18 DIAGNOSIS — T14.8XXA MUSCLE STRAIN: ICD-10-CM

## 2021-02-18 PROCEDURE — 97110 THERAPEUTIC EXERCISES: CPT

## 2021-02-18 PROCEDURE — 97140 MANUAL THERAPY 1/> REGIONS: CPT

## 2021-02-18 NOTE — PROGRESS NOTES
Dx: Muscle strain (T14.8XXA)         Insurance (Authorized # of Visits):  [address] exp 4/19/21   POC visits: 6   Authorizing Physician: Dr. Jay Edwards  Next MD visit: none scheduled  Fall Risk: standard         Precautions:         Subjective: Pt states frank modified plank alt arm lifts     Charges: MTx1, TEx1      Total Timed Treatment: 30 min  Total Treatment Time: 30 min

## 2021-02-23 ENCOUNTER — OFFICE VISIT (OUTPATIENT)
Dept: PHYSICAL THERAPY | Age: 66
End: 2021-02-23
Attending: INTERNAL MEDICINE
Payer: COMMERCIAL

## 2021-02-23 DIAGNOSIS — T14.8XXA MUSCLE STRAIN: ICD-10-CM

## 2021-02-23 PROCEDURE — 97140 MANUAL THERAPY 1/> REGIONS: CPT

## 2021-02-23 PROCEDURE — 97110 THERAPEUTIC EXERCISES: CPT

## 2021-02-23 NOTE — PROGRESS NOTES
Trev  Pt has attended 7 visits in Physical Therapy.    Dx: Muscle strain (T14.8XXA)         Insurance (Authorized # of Visits):  [address] exp 4/19/21   POC visits: 6   Authorizing Physician: Dr. Miller Villanueva  Next MD visit: none scheduled  Fall R quadrant oblique MFR Manual therapy:  Supine R upper quadrant oblique MFR       There ex:  Trunk rot red TB 15x R/L  Down/up chops red TB 15x ea R/L   Modified plank on table alt arm lifts 15x R/L     There ex:  Re-assessment  Trunk rot red TB 15x  Down/up

## 2021-03-08 ENCOUNTER — OFFICE VISIT (OUTPATIENT)
Dept: SURGERY | Facility: CLINIC | Age: 66
End: 2021-03-08

## 2021-03-08 VITALS
WEIGHT: 257.38 LBS | SYSTOLIC BLOOD PRESSURE: 135 MMHG | DIASTOLIC BLOOD PRESSURE: 82 MMHG | BODY MASS INDEX: 51.89 KG/M2 | OXYGEN SATURATION: 100 % | HEART RATE: 99 BPM | HEIGHT: 59 IN

## 2021-03-08 DIAGNOSIS — E11.9 TYPE 2 DIABETES MELLITUS WITHOUT RETINOPATHY (HCC): Primary | ICD-10-CM

## 2021-03-08 DIAGNOSIS — R63.2 BINGE EATING: ICD-10-CM

## 2021-03-08 DIAGNOSIS — Z51.81 ENCOUNTER FOR THERAPEUTIC DRUG MONITORING: ICD-10-CM

## 2021-03-08 DIAGNOSIS — E66.01 MORBID OBESITY WITH BMI OF 50.0-59.9, ADULT (HCC): ICD-10-CM

## 2021-03-08 DIAGNOSIS — I10 ESSENTIAL HYPERTENSION: ICD-10-CM

## 2021-03-08 DIAGNOSIS — G47.33 OSA (OBSTRUCTIVE SLEEP APNEA): ICD-10-CM

## 2021-03-08 PROCEDURE — 3079F DIAST BP 80-89 MM HG: CPT | Performed by: INTERNAL MEDICINE

## 2021-03-08 PROCEDURE — 99214 OFFICE O/P EST MOD 30 MIN: CPT | Performed by: INTERNAL MEDICINE

## 2021-03-08 PROCEDURE — 3075F SYST BP GE 130 - 139MM HG: CPT | Performed by: INTERNAL MEDICINE

## 2021-03-08 PROCEDURE — 3008F BODY MASS INDEX DOCD: CPT | Performed by: INTERNAL MEDICINE

## 2021-03-08 RX ORDER — PHENTERMINE HYDROCHLORIDE 37.5 MG/1
37.5 TABLET ORAL
Qty: 30 TABLET | Refills: 2 | Status: SHIPPED | OUTPATIENT
Start: 2021-03-08 | End: 2021-06-10

## 2021-03-08 NOTE — PROGRESS NOTES
[address] [place] [address] BARIATRIC AND WEIGHT LOSS CLINIC  [address] [place] [address]  Dept: [phone]         Patient:  Raul Menezes  :      10/24/1955  MRN:      [id number]    Chief Complaint:  Patient presents with:   Follow - Up  Clarice Iraheta Lisinopril-hydroCHLOROthiazide 20-25 MG Oral Tab Take 1 tablet by mouth daily. 90 tablet 1   • Fluticasone Propionate (FLONASE) 50 MCG/ACT Nasal Suspension 2 sprays by Each Nare route daily.  1 Inhaler 3   • Levothyroxine Sodium 175 MCG Oral Tab Take one ta quittin.6      Smokeless tobacco: Never Used    Vaping Use      Vaping Use: Never used    Substance and Sexual Activity      Alcohol use: Yes        Comment: social - monthly      Drug use: No      Sexual activity: Yes        Partners: Male        Com Surgical History:   Procedure Laterality Date   • COLONOSCOPY  11/7/2011    per NG   • HYSTEROSCOPY     • MYOMECTOMY HYSTEROSCOPIC N/A 6/3/2019    Performed by Remington Howe MD at [facility] MAIN OR     Family History:    Family History   Problem Relation Age were reviewed and are negative    Physical Exam:   General appearance: alert, appears stated age and cooperative  Head: Normocephalic, without obvious abnormality, atraumatic  Lungs: clear to auscultation bilaterally  Heart: S1, S2 normal, no murmur, click instructed to continue wearing their CPaP as recommended- Patient states that she does not wear her CPAP     DYSLIPIDEMIA: Stable on the above prescribed meal plan and medication. Liver function stable.     Lab Results   Component Value Date/Time    CHOLEST

## 2021-03-15 ENCOUNTER — LAB ENCOUNTER (OUTPATIENT)
Dept: LAB | Facility: HOSPITAL | Age: 66
End: 2021-03-15
Attending: INTERNAL MEDICINE
Payer: COMMERCIAL

## 2021-03-15 DIAGNOSIS — G47.33 OSA (OBSTRUCTIVE SLEEP APNEA): ICD-10-CM

## 2021-03-15 DIAGNOSIS — Z51.81 ENCOUNTER FOR THERAPEUTIC DRUG MONITORING: ICD-10-CM

## 2021-03-15 DIAGNOSIS — I10 ESSENTIAL HYPERTENSION: ICD-10-CM

## 2021-03-15 DIAGNOSIS — E66.01 MORBID OBESITY WITH BMI OF 50.0-59.9, ADULT (HCC): ICD-10-CM

## 2021-03-15 DIAGNOSIS — E03.9 HYPOTHYROIDISM, UNSPECIFIED TYPE: ICD-10-CM

## 2021-03-15 DIAGNOSIS — E11.9 TYPE 2 DIABETES MELLITUS WITHOUT RETINOPATHY (HCC): ICD-10-CM

## 2021-03-15 DIAGNOSIS — E11.9 DIABETES MELLITUS WITHOUT COMPLICATION (HCC): ICD-10-CM

## 2021-03-15 DIAGNOSIS — R63.2 BINGE EATING: ICD-10-CM

## 2021-03-15 LAB
ALBUMIN SERPL-MCNC: 3.3 G/DL (ref 3.4–5)
ALP LIVER SERPL-CCNC: 98 U/L
ALT SERPL-CCNC: 21 U/L
AST SERPL-CCNC: 14 U/L (ref 15–37)
BILIRUB DIRECT SERPL-MCNC: 0.1 MG/DL (ref 0–0.2)
BILIRUB SERPL-MCNC: 0.6 MG/DL (ref 0.1–2)
CHOLEST SMN-MCNC: 136 MG/DL (ref ?–200)
HDLC SERPL-MCNC: 45 MG/DL (ref 40–59)
LDLC SERPL CALC-MCNC: 69 MG/DL (ref ?–100)
M PROTEIN MFR SERPL ELPH: 7.5 G/DL (ref 6.4–8.2)
NONHDLC SERPL-MCNC: 91 MG/DL (ref ?–130)
PATIENT FASTING Y/N/NP: YES
T4 FREE SERPL-MCNC: 1.4 NG/DL (ref 0.8–1.7)
TRIGL SERPL-MCNC: 111 MG/DL (ref 30–149)
TSI SER-ACNC: 0.33 MIU/ML (ref 0.36–3.74)
VIT B12 SERPL-MCNC: 391 PG/ML (ref 193–986)
VLDLC SERPL CALC-MCNC: 22 MG/DL (ref 0–30)

## 2021-03-15 PROCEDURE — 82607 VITAMIN B-12: CPT

## 2021-03-15 PROCEDURE — 84443 ASSAY THYROID STIM HORMONE: CPT

## 2021-03-15 PROCEDURE — 82397 CHEMILUMINESCENT ASSAY: CPT

## 2021-03-15 PROCEDURE — 36415 COLL VENOUS BLD VENIPUNCTURE: CPT

## 2021-03-15 PROCEDURE — 84439 ASSAY OF FREE THYROXINE: CPT

## 2021-03-15 PROCEDURE — 80076 HEPATIC FUNCTION PANEL: CPT

## 2021-03-15 PROCEDURE — 80061 LIPID PANEL: CPT

## 2021-03-17 ENCOUNTER — PATIENT MESSAGE (OUTPATIENT)
Dept: ENDOCRINOLOGY CLINIC | Facility: CLINIC | Age: 66
End: 2021-03-17

## 2021-03-17 DIAGNOSIS — Z23 NEED FOR VACCINATION: ICD-10-CM

## 2021-03-17 DIAGNOSIS — E03.9 HYPOTHYROIDISM, UNSPECIFIED TYPE: Primary | ICD-10-CM

## 2021-03-18 ENCOUNTER — TELEPHONE (OUTPATIENT)
Dept: ENDOCRINOLOGY CLINIC | Facility: CLINIC | Age: 66
End: 2021-03-18

## 2021-03-18 NOTE — TELEPHONE ENCOUNTER
Pharmacy refill request for;      •  Levothyroxine Sodium 200 MCG Oral Tab, TAKE 1 TABLET BY MOUTH 2 DAYS A WEEK, Disp: 26 tablet, Rfl: 0    •  METOPROLOL SUCCINATE ER 25 MG Oral Tablet 24 Hr, TAKE 1 TABLET(25 MG) BY MOUTH EVERY DAY, Disp: 90 tablet, Rfl:

## 2021-03-19 LAB — LEPTIN: 43.8 NG/ML

## 2021-03-19 RX ORDER — METOPROLOL SUCCINATE 25 MG/1
TABLET, EXTENDED RELEASE ORAL
Qty: 90 TABLET | Refills: 1 | Status: SHIPPED | OUTPATIENT
Start: 2021-03-19 | End: 2021-10-18

## 2021-03-19 RX ORDER — LEVOTHYROXINE SODIUM 0.2 MG/1
TABLET ORAL
Qty: 26 TABLET | Refills: 0 | Status: SHIPPED | OUTPATIENT
Start: 2021-03-19 | End: 2021-09-27 | Stop reason: ALTCHOICE

## 2021-03-19 RX ORDER — METOPROLOL SUCCINATE 25 MG/1
25 TABLET, EXTENDED RELEASE ORAL DAILY
Qty: 90 TABLET | Refills: 0 | Status: SHIPPED | OUTPATIENT
Start: 2021-03-19 | End: 2021-03-19

## 2021-04-05 DIAGNOSIS — E66.01 MORBID OBESITY WITH BMI OF 50.0-59.9, ADULT (HCC): Primary | ICD-10-CM

## 2021-04-05 RX ORDER — TOPIRAMATE 25 MG/1
25 TABLET ORAL EVERY EVENING
Qty: 30 TABLET | Refills: 2 | Status: SHIPPED | OUTPATIENT
Start: 2021-04-05 | End: 2021-06-10

## 2021-04-12 RX ORDER — LEVOTHYROXINE SODIUM 175 UG/1
TABLET ORAL
Qty: 77 TABLET | Refills: 0 | Status: SHIPPED | OUTPATIENT
Start: 2021-04-12 | End: 2021-06-10

## 2021-04-12 RX ORDER — LEVOTHYROXINE SODIUM 175 UG/1
TABLET ORAL
Qty: 72 TABLET | Refills: 0 | Status: SHIPPED | OUTPATIENT
Start: 2021-04-12 | End: 2021-04-12

## 2021-04-12 NOTE — TELEPHONE ENCOUNTER
LOV: 12/23/20 RTC 5-6 months    Future Appointments   Date Time Provider Toney Pritchetti   6/7/2021  9:00 AM Anitra Cheadle, MD [address] [place] [address]     Current regimen per 3/17/21 encounter:   175 mcg daily 6 days a week, 200 mcg 2 days a week    Routed t

## 2021-06-02 ENCOUNTER — TELEPHONE (OUTPATIENT)
Dept: NEPHROLOGY | Facility: CLINIC | Age: 66
End: 2021-06-02

## 2021-06-02 RX ORDER — CYCLOBENZAPRINE HCL 5 MG
5 TABLET ORAL 3 TIMES DAILY PRN
Qty: 20 TABLET | Refills: 0 | Status: SHIPPED | OUTPATIENT
Start: 2021-06-02 | End: 2021-09-27 | Stop reason: ALTCHOICE

## 2021-06-02 NOTE — TELEPHONE ENCOUNTER
Can try Flexeril, 5 mg, 1-2 3 times daily as needed #20. Heating pads, hot showers. See if not better.

## 2021-06-02 NOTE — TELEPHONE ENCOUNTER
Patient contacted. Back pain is ongoing gets better then flairs up again. Started about 1 week ago. Taking OTC pain reliever but not helping. Pain is in the lower back, no sciatica, no injury. Patient states she has discussed this problem with Dr. Rosie Sheppard in the past. Encounter routed to Dr. Rosie Sheppard.

## 2021-06-04 NOTE — TELEPHONE ENCOUNTER
Spoke with patient, discussed below message. Informed pt prescription is ok to . Pt states she is at work but will  prescription later.

## 2021-06-07 ENCOUNTER — TELEMEDICINE (OUTPATIENT)
Dept: ENDOCRINOLOGY CLINIC | Facility: CLINIC | Age: 66
End: 2021-06-07

## 2021-06-07 ENCOUNTER — LAB ENCOUNTER (OUTPATIENT)
Dept: LAB | Facility: HOSPITAL | Age: 66
End: 2021-06-07
Attending: INTERNAL MEDICINE
Payer: COMMERCIAL

## 2021-06-07 DIAGNOSIS — E03.9 HYPOTHYROIDISM, UNSPECIFIED TYPE: ICD-10-CM

## 2021-06-07 DIAGNOSIS — E11.69 TYPE 2 DIABETES MELLITUS WITH OTHER SPECIFIED COMPLICATION, UNSPECIFIED WHETHER LONG TERM INSULIN USE (HCC): ICD-10-CM

## 2021-06-07 DIAGNOSIS — E78.5 DYSLIPIDEMIA: ICD-10-CM

## 2021-06-07 DIAGNOSIS — E55.9 VITAMIN D DEFICIENCY: ICD-10-CM

## 2021-06-07 DIAGNOSIS — E03.9 HYPOTHYROIDISM, UNSPECIFIED TYPE: Primary | ICD-10-CM

## 2021-06-07 PROCEDURE — 3051F HG A1C>EQUAL 7.0%<8.0%: CPT | Performed by: INTERNAL MEDICINE

## 2021-06-07 PROCEDURE — 99214 OFFICE O/P EST MOD 30 MIN: CPT | Performed by: INTERNAL MEDICINE

## 2021-06-07 PROCEDURE — 82306 VITAMIN D 25 HYDROXY: CPT

## 2021-06-07 PROCEDURE — 83036 HEMOGLOBIN GLYCOSYLATED A1C: CPT

## 2021-06-07 PROCEDURE — 84481 FREE ASSAY (FT-3): CPT

## 2021-06-07 PROCEDURE — 36415 COLL VENOUS BLD VENIPUNCTURE: CPT

## 2021-06-07 PROCEDURE — 84443 ASSAY THYROID STIM HORMONE: CPT

## 2021-06-07 PROCEDURE — 84439 ASSAY OF FREE THYROXINE: CPT

## 2021-06-07 RX ORDER — METFORMIN HYDROCHLORIDE 500 MG/1
500 TABLET, EXTENDED RELEASE ORAL
Qty: 60 TABLET | Refills: 3 | Status: SHIPPED | OUTPATIENT
Start: 2021-06-07

## 2021-06-07 RX ORDER — DULAGLUTIDE 1.5 MG/.5ML
1.5 INJECTION, SOLUTION SUBCUTANEOUS WEEKLY
Qty: 6 ML | Refills: 0 | Status: SHIPPED | OUTPATIENT
Start: 2021-06-07

## 2021-06-07 NOTE — PROGRESS NOTES
Telehealth outside of [address] Verbal Consent   I conducted a telehealth visit with Oralia Alfonso today, 6/7/2021, which was completed using two-way, real-time interactive audio and video communication.  This has been done in good tawanna to rei no  History of Nephropathy: no     ASSOCIATED COMPLICATIONS:   HTN: yes  Hyperlipidemia: yes  Coronary Artery Disease:  no  Cerebrovascular Disease: no        HOME GLUCOSE READINGS:   Fasting : late 100s  No low BG under 70        CURRENT DIABETIC MEDICATI route daily. 1 Inhaler 3   • clotrimazole-betamethasone 1-0.05 % External Cream Apply 1 Application topically 2 (two) times daily.  45 g 2   • ROSUVASTATIN CALCIUM 20 MG Oral Tab TAKE 1 TABLET BY MOUTH DAILY 90 tablet 1   • Ketoconazole 2 % External Shampoo breathing, no audible wheezing    Skin:  normal moisture and skin texture, no visible lesions  Hair and nails: normal scalp hair  Hematologic:  no excessive bruising  Neuro: motor grossly intact, moving all extremities without difficulty  Psychiatric:  yessenia gain  LT 4 175 mcg daily x 6 days + LT4 200 mcg daily x once days of the week  TSH and Free T4   Depending on results we can adjust dose/ consider T3 replacement  Reviewed T3 with the patient along with associated SE  Discussed administration and complianc

## 2021-06-09 ENCOUNTER — TELEPHONE (OUTPATIENT)
Dept: ENDOCRINOLOGY CLINIC | Facility: CLINIC | Age: 66
End: 2021-06-09

## 2021-06-09 DIAGNOSIS — E03.9 HYPOTHYROIDISM, UNSPECIFIED TYPE: Primary | ICD-10-CM

## 2021-06-09 NOTE — TELEPHONE ENCOUNTER
a1c is high today at 7.8 %   Patient had reported that she had not been taking her medications  Please make sure she has resumed medications    Vitamin D is normal    Patient had reported weight gain  However, her thyroid labs indicate that she is getting

## 2021-06-10 ENCOUNTER — LAB ENCOUNTER (OUTPATIENT)
Dept: LAB | Facility: HOSPITAL | Age: 66
End: 2021-06-10
Attending: INTERNAL MEDICINE
Payer: COMMERCIAL

## 2021-06-10 ENCOUNTER — OFFICE VISIT (OUTPATIENT)
Dept: SURGERY | Facility: CLINIC | Age: 66
End: 2021-06-10

## 2021-06-10 VITALS
WEIGHT: 254 LBS | DIASTOLIC BLOOD PRESSURE: 81 MMHG | HEIGHT: 59 IN | OXYGEN SATURATION: 95 % | SYSTOLIC BLOOD PRESSURE: 142 MMHG | HEART RATE: 93 BPM | BODY MASS INDEX: 51.2 KG/M2

## 2021-06-10 DIAGNOSIS — R63.2 BINGE EATING: ICD-10-CM

## 2021-06-10 DIAGNOSIS — Z51.81 ENCOUNTER FOR THERAPEUTIC DRUG MONITORING: ICD-10-CM

## 2021-06-10 DIAGNOSIS — E11.69 TYPE 2 DIABETES MELLITUS WITH OTHER SPECIFIED COMPLICATION, UNSPECIFIED WHETHER LONG TERM INSULIN USE (HCC): ICD-10-CM

## 2021-06-10 DIAGNOSIS — E11.9 TYPE 2 DIABETES MELLITUS WITHOUT RETINOPATHY (HCC): Primary | ICD-10-CM

## 2021-06-10 DIAGNOSIS — I10 ESSENTIAL HYPERTENSION: ICD-10-CM

## 2021-06-10 DIAGNOSIS — E66.01 MORBID OBESITY WITH BMI OF 50.0-59.9, ADULT (HCC): ICD-10-CM

## 2021-06-10 PROCEDURE — 99214 OFFICE O/P EST MOD 30 MIN: CPT | Performed by: INTERNAL MEDICINE

## 2021-06-10 PROCEDURE — 82570 ASSAY OF URINE CREATININE: CPT

## 2021-06-10 PROCEDURE — 3008F BODY MASS INDEX DOCD: CPT | Performed by: INTERNAL MEDICINE

## 2021-06-10 PROCEDURE — 3077F SYST BP >= 140 MM HG: CPT | Performed by: INTERNAL MEDICINE

## 2021-06-10 PROCEDURE — 3079F DIAST BP 80-89 MM HG: CPT | Performed by: INTERNAL MEDICINE

## 2021-06-10 PROCEDURE — 3061F NEG MICROALBUMINURIA REV: CPT | Performed by: INTERNAL MEDICINE

## 2021-06-10 PROCEDURE — 82043 UR ALBUMIN QUANTITATIVE: CPT

## 2021-06-10 RX ORDER — PHENTERMINE HYDROCHLORIDE 37.5 MG/1
37.5 TABLET ORAL
Qty: 60 TABLET | Refills: 1 | Status: SHIPPED | OUTPATIENT
Start: 2021-06-10 | End: 2021-10-20

## 2021-06-10 RX ORDER — TOPIRAMATE 25 MG/1
25 TABLET ORAL EVERY EVENING
Qty: 90 TABLET | Refills: 1 | Status: SHIPPED | OUTPATIENT
Start: 2021-06-10 | End: 2021-10-20

## 2021-06-10 RX ORDER — LEVOTHYROXINE SODIUM 175 UG/1
175 TABLET ORAL
Qty: 90 TABLET | Refills: 0 | Status: SHIPPED | OUTPATIENT
Start: 2021-06-10 | End: 2021-10-18

## 2021-06-10 NOTE — TELEPHONE ENCOUNTER
Spoke to patient to relay message below - patient stated understanding and will consistently take DM meds.   Patient stated understanding and will take 175mcg levothyroxine daily and repeat labs in 8 weeks  Labs ordered  RX for 175mcg sent to pharmacy, per

## 2021-06-10 NOTE — PROGRESS NOTES
[facility] BARIATRIC AND WEIGHT LOSS CLINIC  [address] [address]  Dept: [phone]         Patient:  Rick Newman  :      10/24/1955  MRN:      [id number]    Chief Complaint:  Patient presents with:  Weight Management 175 MCG Oral Tab Take 1 tablet (175 mcg total) by mouth before breakfast. Daily 90 tablet 0   • metFORMIN HCl  MG Oral Tablet 24 Hr Take 1 tablet (500 mg total) by mouth 2 (two) times daily before meals.  60 tablet 3   • Dulaglutide (TRULICITY) 1.5 MG education: Not on file      Highest education level: Not on file    Occupational History      Occupation: office    Tobacco Use      Smoking status: Former Smoker        Packs/day: 0.50        Years: 7.00        Pack years: 3.5        Types: Cigarettes [employer]:       Active Member of Clubs or Organizations:       Attends Club or Organization Meetings:       Marital Status:   Intimate Partner Violence:       Fear of Current or Ex-Partner:       Emotionally Abused:       Physically Abused:       fatigue  Respiratory: negative  Cardiovascular: negative  Gastrointestinal: negative  Musculoskeletal:positive for back pain  Neurological: negative  Behavioral/Psych: negative  Endocrine: negative  All other systems were reviewed and are negative    Physi at upcoming clinic visits. HYPERTENSION: Blood pressure stable on the above medications. No interval change in antihypertensive medication.      ROGELIO: Patient was instructed to continue wearing their CPaP as recommended- Patient states that she does n

## 2021-06-22 RX ORDER — LISINOPRIL AND HYDROCHLOROTHIAZIDE 25; 20 MG/1; MG/1
1 TABLET ORAL DAILY
Qty: 90 TABLET | Refills: 0 | Status: SHIPPED | OUTPATIENT
Start: 2021-06-22 | End: 2021-10-18

## 2021-07-12 ENCOUNTER — HOSPITAL ENCOUNTER (OUTPATIENT)
Dept: GENERAL RADIOLOGY | Facility: HOSPITAL | Age: 66
Discharge: HOME | End: 2021-07-12
Attending: INTERNAL MEDICINE
Payer: COMMERCIAL

## 2021-07-12 ENCOUNTER — OFFICE VISIT (OUTPATIENT)
Dept: NEPHROLOGY | Facility: CLINIC | Age: 66
End: 2021-07-12

## 2021-07-12 VITALS
BODY MASS INDEX: 51.2 KG/M2 | SYSTOLIC BLOOD PRESSURE: 106 MMHG | HEIGHT: 59 IN | HEART RATE: 91 BPM | DIASTOLIC BLOOD PRESSURE: 71 MMHG | WEIGHT: 254 LBS

## 2021-07-12 DIAGNOSIS — G89.29 CHRONIC BILATERAL LOW BACK PAIN WITHOUT SCIATICA: Primary | ICD-10-CM

## 2021-07-12 DIAGNOSIS — I10 ESSENTIAL HYPERTENSION: ICD-10-CM

## 2021-07-12 DIAGNOSIS — G47.33 OSA (OBSTRUCTIVE SLEEP APNEA): ICD-10-CM

## 2021-07-12 DIAGNOSIS — M54.50 CHRONIC BILATERAL LOW BACK PAIN WITHOUT SCIATICA: ICD-10-CM

## 2021-07-12 DIAGNOSIS — E66.9 OBESITY, UNSPECIFIED CLASSIFICATION, UNSPECIFIED OBESITY TYPE, UNSPECIFIED WHETHER SERIOUS COMORBIDITY PRESENT: ICD-10-CM

## 2021-07-12 DIAGNOSIS — E11.9 DIABETES MELLITUS WITHOUT COMPLICATION (HCC): ICD-10-CM

## 2021-07-12 DIAGNOSIS — G89.29 CHRONIC BILATERAL LOW BACK PAIN WITHOUT SCIATICA: ICD-10-CM

## 2021-07-12 DIAGNOSIS — M54.50 CHRONIC BILATERAL LOW BACK PAIN WITHOUT SCIATICA: Primary | ICD-10-CM

## 2021-07-12 PROCEDURE — 3074F SYST BP LT 130 MM HG: CPT | Performed by: INTERNAL MEDICINE

## 2021-07-12 PROCEDURE — 3008F BODY MASS INDEX DOCD: CPT | Performed by: INTERNAL MEDICINE

## 2021-07-12 PROCEDURE — 99214 OFFICE O/P EST MOD 30 MIN: CPT | Performed by: INTERNAL MEDICINE

## 2021-07-12 PROCEDURE — 3078F DIAST BP <80 MM HG: CPT | Performed by: INTERNAL MEDICINE

## 2021-07-12 PROCEDURE — 72110 X-RAY EXAM L-2 SPINE 4/>VWS: CPT | Performed by: INTERNAL MEDICINE

## 2021-07-12 NOTE — PROGRESS NOTES
[facility], [facility]  Nephrology Daily Progress Note    Jayy Du  [id number]  63year old  Patient presents with: Follow - Up: follow up      HPI:   Jayy Du is a 63year old female.   82-year-old female with a history of hypertension, hypercholeste 51.3 -       VITALS: WEIGHT ONLY 3/8/2021 6/10/2021 7/12/2021   Weight 257 lbs 6 oz 254 lbs 254 lbs       Constitutional: appears well hydrated alert and responsive no acute distress noted  Neck/Thyroid: neck is supple without adenopathy  Lymphatic: no abn week. INJECT 1.5 MG( 0.5 ML) UNDER THE SKIN EVERY 7 DAYS, Disp: 6 mL, Rfl: 0  •  METOPROLOL SUCCINATE ER 25 MG Oral Tablet 24 Hr, TAKE 1 TABLET BY MOUTH EVERY DAY, Disp: 90 tablet, Rfl: 1  •  Fluticasone Propionate (FLONASE) 50 MCG/ACT Nasal Suspension, 2 hand     Degenerative arthritis of finger     Ganglion of right hand     Pain of right thumb     Right wrist pain     Ulnar impaction syndrome     ROGELIO (obstructive sleep apnea)     Morbid obesity with BMI of 50.0-59.9, adult ([facility] [address]     Pre-diabetes     Carlotta

## 2021-07-12 NOTE — PATIENT INSTRUCTIONS
Please do x-rays of your lower back. If no contraindications will refer to physical therapy. See Dr. Oc Andrade to discuss other options for your sleep apnea.

## 2021-07-13 ENCOUNTER — TELEPHONE (OUTPATIENT)
Dept: NEPHROLOGY | Facility: CLINIC | Age: 66
End: 2021-07-13

## 2021-07-13 DIAGNOSIS — M54.50 LOW BACK PAIN, UNSPECIFIED BACK PAIN LATERALITY, UNSPECIFIED CHRONICITY, UNSPECIFIED WHETHER SCIATICA PRESENT: Primary | ICD-10-CM

## 2021-07-13 NOTE — TELEPHONE ENCOUNTER
Spoke to patient. Results message relayed. Patient states she saw results already in My chart. Referral signed and sent to [facility] for insurance authorization. Patient will let Dr. Wing Brunner know if symptoms do not improve.

## 2021-07-13 NOTE — TELEPHONE ENCOUNTER
X-rays of the lumbar spine shows mild curvature of the spine but moderately severe arthritis. Refer to physical therapy for further work-up and evaluation. Let me know if symptoms are not improving.

## 2021-08-02 ENCOUNTER — TELEPHONE (OUTPATIENT)
Dept: GASTROENTEROLOGY | Facility: CLINIC | Age: 66
End: 2021-08-02

## 2021-08-02 RX ORDER — BUDESONIDE AND FORMOTEROL FUMARATE DIHYDRATE 160; 4.5 UG/1; UG/1
2 AEROSOL RESPIRATORY (INHALATION) 2 TIMES DAILY
Qty: 1 EACH | Refills: 5 | Status: SHIPPED | OUTPATIENT
Start: 2021-08-02

## 2021-08-02 NOTE — TELEPHONE ENCOUNTER
Pt wants Dr to prescribe the inhaler Symbicort.  Please advise Dr wanted to know which one she liked

## 2021-08-14 ENCOUNTER — LAB ENCOUNTER (OUTPATIENT)
Dept: LAB | Facility: HOSPITAL | Age: 66
End: 2021-08-14
Attending: INTERNAL MEDICINE
Payer: COMMERCIAL

## 2021-08-14 DIAGNOSIS — E03.9 HYPOTHYROIDISM, UNSPECIFIED TYPE: ICD-10-CM

## 2021-08-14 LAB
T4 FREE SERPL-MCNC: 1.3 NG/DL (ref 0.8–1.7)
TSI SER-ACNC: 1.48 MIU/ML (ref 0.36–3.74)

## 2021-08-14 PROCEDURE — 84443 ASSAY THYROID STIM HORMONE: CPT

## 2021-08-14 PROCEDURE — 36415 COLL VENOUS BLD VENIPUNCTURE: CPT

## 2021-08-14 PROCEDURE — 84439 ASSAY OF FREE THYROXINE: CPT

## 2021-08-23 ENCOUNTER — PATIENT MESSAGE (OUTPATIENT)
Dept: NEPHROLOGY | Facility: CLINIC | Age: 66
End: 2021-08-23

## 2021-08-24 ENCOUNTER — TELEPHONE (OUTPATIENT)
Dept: NEPHROLOGY | Facility: CLINIC | Age: 66
End: 2021-08-24

## 2021-08-24 DIAGNOSIS — R10.11 RIGHT UPPER QUADRANT ABDOMINAL PAIN: Primary | ICD-10-CM

## 2021-08-24 NOTE — TELEPHONE ENCOUNTER
Pt called in stating  She recently started having cramping/contractions of the intestine the one that runs across  the body horizontally, with pain (not severe) of the abdomen that comes and goes. Also there is a significant swelling of the abdomen.  Pt is

## 2021-08-24 NOTE — TELEPHONE ENCOUNTER
From: Jayy Du  To: Wilbur Pettit MD  Sent: 8/23/2021 6:54 PM CDT  Subject: Referral Request    Hello Dr Parker Emerson  Need a referral for a gastroenterologist, ie Dr Fred Buitrago or Dr Aiyana Beltrán if possible, or any other from that specialty that can get me

## 2021-08-24 NOTE — TELEPHONE ENCOUNTER
Spoke with patient, states she has had mild abdominal pain and cramping for a few months. States pain is mostly located underneath her ribcage and center of abdomen- the pain comes and goes. States it is mild and that her abdomen is swollen.  Denies wisam

## 2021-08-31 ENCOUNTER — TELEPHONE (OUTPATIENT)
Dept: SURGERY | Facility: CLINIC | Age: 66
End: 2021-08-31

## 2021-08-31 NOTE — TELEPHONE ENCOUNTER
Fax received with approval response of Phentermine 37.5 medication. Approval granted from 8/27/2021 - 11/27/2021. Faxed to [facility] in [place].  At [phone]

## 2021-09-09 ENCOUNTER — TELEPHONE (OUTPATIENT)
Dept: NEPHROLOGY | Facility: CLINIC | Age: 66
End: 2021-09-09

## 2021-09-09 NOTE — TELEPHONE ENCOUNTER
Pt called in to see if she can have a sooner appt than end of October. Pt has been feeling very fatigued. Pt also has back pain. The schedule is currently booked. Pt states it is urgent.  Please follow up

## 2021-09-27 ENCOUNTER — OFFICE VISIT (OUTPATIENT)
Dept: NEPHROLOGY | Facility: CLINIC | Age: 66
End: 2021-09-27

## 2021-09-27 VITALS
SYSTOLIC BLOOD PRESSURE: 113 MMHG | TEMPERATURE: 98 F | HEART RATE: 98 BPM | DIASTOLIC BLOOD PRESSURE: 81 MMHG | HEIGHT: 59 IN | WEIGHT: 260 LBS | BODY MASS INDEX: 52.41 KG/M2

## 2021-09-27 DIAGNOSIS — E11.9 DIABETES MELLITUS WITHOUT COMPLICATION (HCC): ICD-10-CM

## 2021-09-27 DIAGNOSIS — G47.33 OSA (OBSTRUCTIVE SLEEP APNEA): ICD-10-CM

## 2021-09-27 DIAGNOSIS — R10.11 RIGHT UPPER QUADRANT ABDOMINAL PAIN: Primary | ICD-10-CM

## 2021-09-27 DIAGNOSIS — I10 ESSENTIAL HYPERTENSION: ICD-10-CM

## 2021-09-27 PROCEDURE — 3079F DIAST BP 80-89 MM HG: CPT | Performed by: INTERNAL MEDICINE

## 2021-09-27 PROCEDURE — 99214 OFFICE O/P EST MOD 30 MIN: CPT | Performed by: INTERNAL MEDICINE

## 2021-09-27 PROCEDURE — 3008F BODY MASS INDEX DOCD: CPT | Performed by: INTERNAL MEDICINE

## 2021-09-27 PROCEDURE — 3074F SYST BP LT 130 MM HG: CPT | Performed by: INTERNAL MEDICINE

## 2021-09-27 NOTE — PATIENT INSTRUCTIONS
Do laboratory studies and the CT scan is ordered. See GI as you are doing. Also make an appt with Dr. Heather Devi as we discussed regarding your sleep apnea.

## 2021-09-27 NOTE — PROGRESS NOTES
[facility], [facility]  Nephrology Daily Progress Note    Susanne Schulte  [id number]  63year old  Patient presents with: Follow - Up: FOLLOW UP REGULAR CHECK UP      HPI:   Susanne Schulte is a 63year old female.   29-year-old female with a history of hypertensi gait disturbance  Psychiatric:  Negative for inappropriate interaction and psychiatric symptoms  Respiratory:  Negative for cough, dyspnea and wheezing      PHYSICAL EXAM:     Vitals History 7/12/2021 9/27/2021 9/27/2021   /71 - 113/81   BP Location 1 tablet (500 mg total) by mouth 2 (two) times daily before meals.  (Patient taking differently: Take 500 mg by mouth daily.), Disp: 60 tablet, Rfl: 3  •  Dulaglutide (TRULICITY) 1.5 BV/9.0FK Subcutaneous Solution Pen-injector, Inject 1.5 mg into the skin o diabetes mellitus without retinopathy ([facility] [address]     Age-related nuclear cataract of both eyes     Acute chest pain     Azotemia     Hyperglycemia     Increased appetite     Stress      She still is having ongoing problems with chronic fatigue.   Again discussed

## 2021-10-04 ENCOUNTER — TELEPHONE (OUTPATIENT)
Dept: NEPHROLOGY | Facility: CLINIC | Age: 66
End: 2021-10-04

## 2021-10-04 NOTE — TELEPHONE ENCOUNTER
Insurance prefers Advair Diskus inhaler over Symbicort. Spoke with patient and explained that her insurance plan actually prefers Advair Diskus inhaler over Symbicort. Patient not interested in switching inhalers at this time.

## 2021-10-06 ENCOUNTER — TELEPHONE (OUTPATIENT)
Dept: NEPHROLOGY | Facility: CLINIC | Age: 66
End: 2021-10-06

## 2021-10-06 DIAGNOSIS — Z12.31 VISIT FOR SCREENING MAMMOGRAM: Primary | ICD-10-CM

## 2021-10-11 ENCOUNTER — TELEPHONE (OUTPATIENT)
Dept: NEPHROLOGY | Facility: CLINIC | Age: 66
End: 2021-10-11

## 2021-10-11 ENCOUNTER — LAB ENCOUNTER (OUTPATIENT)
Dept: LAB | Facility: HOSPITAL | Age: 66
End: 2021-10-11
Attending: INTERNAL MEDICINE
Payer: COMMERCIAL

## 2021-10-11 ENCOUNTER — HOSPITAL ENCOUNTER (OUTPATIENT)
Dept: CT IMAGING | Facility: HOSPITAL | Age: 66
Discharge: HOME | End: 2021-10-11
Attending: INTERNAL MEDICINE
Payer: COMMERCIAL

## 2021-10-11 ENCOUNTER — HOSPITAL ENCOUNTER (OUTPATIENT)
Dept: MAMMOGRAPHY | Age: 66
Discharge: HOME | End: 2021-10-11
Attending: INTERNAL MEDICINE
Payer: COMMERCIAL

## 2021-10-11 DIAGNOSIS — R10.11 RIGHT UPPER QUADRANT ABDOMINAL PAIN: ICD-10-CM

## 2021-10-11 DIAGNOSIS — E11.9 DIABETES MELLITUS WITHOUT COMPLICATION (HCC): Primary | ICD-10-CM

## 2021-10-11 DIAGNOSIS — I10 ESSENTIAL HYPERTENSION: ICD-10-CM

## 2021-10-11 DIAGNOSIS — E11.9 DIABETES MELLITUS WITHOUT COMPLICATION (HCC): ICD-10-CM

## 2021-10-11 DIAGNOSIS — Z12.31 VISIT FOR SCREENING MAMMOGRAM: ICD-10-CM

## 2021-10-11 PROCEDURE — 36415 COLL VENOUS BLD VENIPUNCTURE: CPT

## 2021-10-11 PROCEDURE — 85025 COMPLETE CBC W/AUTO DIFF WBC: CPT

## 2021-10-11 PROCEDURE — 77067 SCR MAMMO BI INCL CAD: CPT | Performed by: INTERNAL MEDICINE

## 2021-10-11 PROCEDURE — 83036 HEMOGLOBIN GLYCOSYLATED A1C: CPT

## 2021-10-11 PROCEDURE — 80053 COMPREHEN METABOLIC PANEL: CPT

## 2021-10-11 PROCEDURE — 77063 BREAST TOMOSYNTHESIS BI: CPT | Performed by: INTERNAL MEDICINE

## 2021-10-11 PROCEDURE — 74160 CT ABDOMEN W/CONTRAST: CPT | Performed by: INTERNAL MEDICINE

## 2021-10-11 PROCEDURE — 80061 LIPID PANEL: CPT

## 2021-10-12 NOTE — TELEPHONE ENCOUNTER
Spoke with patient she had CT scan done yesterday. She is requesting results. States she feels same from when she saw Dr. Vic Hubbard on 09/27/21. Still having RUQ pain, fatigue, bloating/gas. Wants to know what Dr. Vic Hubbard recommends ?

## 2021-10-13 ENCOUNTER — LAB ENCOUNTER (OUTPATIENT)
Dept: LAB | Facility: HOSPITAL | Age: 66
End: 2021-10-13
Attending: INTERNAL MEDICINE
Payer: COMMERCIAL

## 2021-10-13 PROCEDURE — 81003 URINALYSIS AUTO W/O SCOPE: CPT

## 2021-10-13 NOTE — TELEPHONE ENCOUNTER
CT scan shows a fatty liver but otherwise was normal.  Refer to GI for further work-up and evaluation.

## 2021-10-13 NOTE — TELEPHONE ENCOUNTER
Spoke with patient, discussed below message. Pt verbalizes understanding. Pt requesting pain medication- states pain is 7-8 out of 10. Pt states the pain is constant and hasn't taken any medication yet- wanted to ask before taking tylenol.

## 2021-10-13 NOTE — TELEPHONE ENCOUNTER
Spoke with patient, discussed below message. Pt verbalizes understanding. Pt requesting referral for dietician? Thanks!

## 2021-10-16 ENCOUNTER — TELEPHONE (OUTPATIENT)
Dept: NEPHROLOGY | Facility: CLINIC | Age: 66
End: 2021-10-16

## 2021-10-16 DIAGNOSIS — E78.00 PURE HYPERCHOLESTEROLEMIA: Primary | ICD-10-CM

## 2021-10-16 DIAGNOSIS — I10 ESSENTIAL HYPERTENSION: ICD-10-CM

## 2021-10-16 DIAGNOSIS — E03.9 HYPOTHYROIDISM, UNSPECIFIED TYPE: ICD-10-CM

## 2021-10-16 NOTE — TELEPHONE ENCOUNTER
Labs are okay except A1c is still running a little high. Follow-up with endocrine. Her cholesterol readings are also high. Is she taking Crestor as prescribed?

## 2021-10-18 RX ORDER — LEVOTHYROXINE SODIUM 175 UG/1
175 TABLET ORAL
Qty: 90 TABLET | Refills: 0 | Status: SHIPPED | OUTPATIENT
Start: 2021-10-18 | End: 2022-01-13

## 2021-10-18 RX ORDER — METOPROLOL SUCCINATE 25 MG/1
25 TABLET, EXTENDED RELEASE ORAL DAILY
Qty: 90 TABLET | Refills: 1 | Status: SHIPPED | OUTPATIENT
Start: 2021-10-18

## 2021-10-18 RX ORDER — LISINOPRIL AND HYDROCHLOROTHIAZIDE 25; 20 MG/1; MG/1
1 TABLET ORAL DAILY
Qty: 90 TABLET | Refills: 0 | Status: SHIPPED | OUTPATIENT
Start: 2021-10-18

## 2021-10-18 RX ORDER — ROSUVASTATIN CALCIUM 20 MG/1
20 TABLET, COATED ORAL DAILY
Qty: 90 TABLET | Refills: 1 | Status: SHIPPED | OUTPATIENT
Start: 2021-10-18

## 2021-10-18 NOTE — TELEPHONE ENCOUNTER
Spoke with patient regarding message below. Patient states that she does not take her Crestor daily because she forgets to. She states she will try to remember to take it.       LOV 09/27/21  RTC  6 months  No F/U appt made   TSH 08/14/21 no

## 2021-10-18 NOTE — TELEPHONE ENCOUNTER
She really needs to take it daily. If forgetting because she takes it at bedtime take q am and repeat lipid panel in 1 mo.

## 2021-10-18 NOTE — TELEPHONE ENCOUNTER
Spoke with patient regarding message below. Patient states she will take her Crestor in the morning and will come back in 1 month for labs to be drawn.

## 2021-10-20 ENCOUNTER — TELEPHONE (OUTPATIENT)
Dept: SURGERY | Facility: CLINIC | Age: 66
End: 2021-10-20

## 2021-10-20 ENCOUNTER — OFFICE VISIT (OUTPATIENT)
Dept: SURGERY | Facility: CLINIC | Age: 66
End: 2021-10-20

## 2021-10-20 VITALS
BODY MASS INDEX: 52.11 KG/M2 | HEART RATE: 88 BPM | WEIGHT: 258.5 LBS | SYSTOLIC BLOOD PRESSURE: 159 MMHG | OXYGEN SATURATION: 96 % | DIASTOLIC BLOOD PRESSURE: 95 MMHG | HEIGHT: 59 IN

## 2021-10-20 DIAGNOSIS — F43.9 STRESS: ICD-10-CM

## 2021-10-20 DIAGNOSIS — E66.01 MORBID OBESITY WITH BMI OF 50.0-59.9, ADULT (HCC): Primary | ICD-10-CM

## 2021-10-20 DIAGNOSIS — I10 ESSENTIAL HYPERTENSION: ICD-10-CM

## 2021-10-20 DIAGNOSIS — E11.9 TYPE 2 DIABETES MELLITUS WITHOUT RETINOPATHY (HCC): Primary | ICD-10-CM

## 2021-10-20 DIAGNOSIS — Z51.81 ENCOUNTER FOR THERAPEUTIC DRUG MONITORING: ICD-10-CM

## 2021-10-20 DIAGNOSIS — E66.01 MORBID OBESITY WITH BMI OF 50.0-59.9, ADULT (HCC): ICD-10-CM

## 2021-10-20 PROCEDURE — 99214 OFFICE O/P EST MOD 30 MIN: CPT | Performed by: INTERNAL MEDICINE

## 2021-10-20 PROCEDURE — 3008F BODY MASS INDEX DOCD: CPT | Performed by: INTERNAL MEDICINE

## 2021-10-20 PROCEDURE — 3080F DIAST BP >= 90 MM HG: CPT | Performed by: INTERNAL MEDICINE

## 2021-10-20 PROCEDURE — 3077F SYST BP >= 140 MM HG: CPT | Performed by: INTERNAL MEDICINE

## 2021-10-20 RX ORDER — PHENTERMINE HYDROCHLORIDE 37.5 MG/1
37.5 TABLET ORAL
Qty: 60 TABLET | Refills: 1 | Status: SHIPPED | OUTPATIENT
Start: 2021-10-20 | End: 2022-01-12

## 2021-10-20 RX ORDER — TOPIRAMATE 25 MG/1
25 TABLET ORAL 2 TIMES DAILY
Qty: 180 TABLET | Refills: 1 | Status: SHIPPED | OUTPATIENT
Start: 2021-10-20 | End: 2022-01-12 | Stop reason: SINTOL

## 2021-10-20 NOTE — PROGRESS NOTES
[address] BARIATRIC AND WEIGHT LOSS CLINIC  [address] [place] [address]  Dept: [phone]         Patient:  Candido Goss  :      10/24/1955  MRN:      [id number]    Chief Complaint:  Patient presents with:  Weight Management Refill   • Phentermine HCl 37.5 MG Oral Tab Take 1 tablet (37.5 mg total) by mouth every morning before breakfast. 60 tablet 1   • topiramate 25 MG Oral Tab Take 1 tablet (25 mg total) by mouth 2 (two) times daily.  180 tablet 1   • Lisinopril-hydroCHLOROth status: Former Smoker        Packs/day: 0.50        Years: 7.00        Pack years: 3.5        Types: Cigarettes        Quit date: 2000        Years since quittin.2      Smokeless tobacco: Never Used    Vaping Use      Vaping Use: Never used    Poe file      Active Member of Clubs or Organizations: Not on file      Attends Club or Organization Meetings: Not on file      Marital Status: Not on file  Intimate Partner Violence:       Fear of Current or Ex-Partner: Not on file      Emotionally Abused: No control strategies to reduce calorie intake, Identify triggers for eating and manage cues and Eat slowly and take 20 to 30 minutes to complete each meal    Exercise Goals Reviewed and Discussed    walking    ROS:    Constitutional: positive for fatigue  Re obesity and multiple medical problems outlined above, I do believe that bariatric surgery may prove beneficial if the patient is unable to lose at least 20% of her weight after 6 months time.  Further consideration for bariatric surgery will be discussed at

## 2021-10-20 NOTE — TELEPHONE ENCOUNTER
Dr Pratibha Sanchez,   Please add a referral for weight management, the patient will continue to see Dr Nba Chaudhary every 3 months. Thank you.

## 2021-10-28 ENCOUNTER — OFFICE VISIT (OUTPATIENT)
Dept: GASTROENTEROLOGY | Facility: CLINIC | Age: 66
End: 2021-10-28

## 2021-10-28 ENCOUNTER — TELEPHONE (OUTPATIENT)
Dept: GASTROENTEROLOGY | Facility: CLINIC | Age: 66
End: 2021-10-28

## 2021-10-28 VITALS
HEIGHT: 59 IN | HEART RATE: 90 BPM | BODY MASS INDEX: 52.21 KG/M2 | DIASTOLIC BLOOD PRESSURE: 84 MMHG | SYSTOLIC BLOOD PRESSURE: 145 MMHG | WEIGHT: 259 LBS

## 2021-10-28 DIAGNOSIS — Z12.11 SCREENING FOR COLORECTAL CANCER: Primary | ICD-10-CM

## 2021-10-28 DIAGNOSIS — K21.9 GASTROESOPHAGEAL REFLUX DISEASE, UNSPECIFIED WHETHER ESOPHAGITIS PRESENT: ICD-10-CM

## 2021-10-28 DIAGNOSIS — K59.00 CONSTIPATION, UNSPECIFIED CONSTIPATION TYPE: ICD-10-CM

## 2021-10-28 DIAGNOSIS — R10.9 RIGHT SIDED ABDOMINAL PAIN: ICD-10-CM

## 2021-10-28 DIAGNOSIS — Z12.12 SCREENING FOR COLORECTAL CANCER: Primary | ICD-10-CM

## 2021-10-28 DIAGNOSIS — R19.8 IRREGULAR BOWEL HABITS: ICD-10-CM

## 2021-10-28 PROCEDURE — 99204 OFFICE O/P NEW MOD 45 MIN: CPT | Performed by: INTERNAL MEDICINE

## 2021-10-28 PROCEDURE — 3079F DIAST BP 80-89 MM HG: CPT | Performed by: INTERNAL MEDICINE

## 2021-10-28 PROCEDURE — 3008F BODY MASS INDEX DOCD: CPT | Performed by: INTERNAL MEDICINE

## 2021-10-28 PROCEDURE — 3077F SYST BP >= 140 MM HG: CPT | Performed by: INTERNAL MEDICINE

## 2021-10-28 NOTE — TELEPHONE ENCOUNTER
Scheduled for:  Colonoscopy [address] ,[address]   Provider Name:  Dr. Linda Good  Date:  1/3/2022  Location:  [facility]   Sedation:  Mac   Time:  4676 Am (pt is aware to arrive at 0715 Am )   Prep: Golytely or Trilyte  Prep instructions were given to pt in the offic

## 2021-10-28 NOTE — H&P
[facility], [facility] - Gastroenterology                                                                                                  Clinic History and Physical High blood pressure    • High cholesterol    • Lichen sclerosus et atrophicus of the vulva    • Lipid screening 2/10/2014    per NG   • Mammogram abnormal 08/2006    MICROCALCIFICATION UNCHANGED   • Migraines    • Morbid obesity with BMI of 50.0-59.9, adul tablet 0   • Budesonide-Formoterol Fumarate (SYMBICORT) 160-4.5 MCG/ACT Inhalation Aerosol Inhale 2 puffs into the lungs 2 (two) times daily.  1 each 5   • metFORMIN HCl  MG Oral Tablet 24 Hr Take 1 tablet (500 mg total) by mouth 2 (two) times daily b colonoscopy noted in November 2011 with Dr. Hue Alfonso for colorectal cancer screening found to have two colon polyps, diverticulosis and hemorrhoids. Labs from 10/11/21 note an unremarkable hgb and LFTs.  Noted CT of the abdomen 2/26/20 and 10/11/21 for abdomin be life threatening. I also specifically mentioned the risk of missed lesions/polyps. All questions were answered to the patient’s satisfaction.  The patient signed informed consent and elected to proceed with colonoscopy with intervention [i.e. polypectomy

## 2021-10-28 NOTE — PATIENT INSTRUCTIONS
Upper endoscopy and Colonoscopy  1. Schedule colonoscopy [dx: colorectal cancer screening] and upper endoscopy [GERD and abdominal pain] with monitored anesthesia care (MAC) at the hospital    2.   bowel prep from pharmacy ([facility] or [facility])

## 2021-11-01 ENCOUNTER — TELEMEDICINE (OUTPATIENT)
Dept: INTEGRATIVE MEDICINE | Facility: CLINIC | Age: 66
End: 2021-11-01

## 2021-11-01 DIAGNOSIS — E11.9 TYPE 2 DIABETES MELLITUS WITHOUT RETINOPATHY (HCC): ICD-10-CM

## 2021-11-01 PROCEDURE — 97803 MED NUTRITION INDIV SUBSEQ: CPT | Performed by: NUTRITIONIST

## 2021-11-01 NOTE — PROGRESS NOTES
\"This visit is conducted using Telemedicine with live, interactive video and audio. \"  Patient verbally consents to a virtual audio-video consultation on 11.1. 21.   Patient understands and accepts financial responsibility for any deductible, co-insurance a benefits and consented to medical nutrition therapy.       See patient instructions    Future considerations: consider magnesium and vitamin D supplementation    Follow up in 3-4 weeks    Time spent with patient: 45 minutes    Dietary Supplements:   None womack

## 2021-12-21 ENCOUNTER — TELEPHONE (OUTPATIENT)
Dept: NEPHROLOGY | Facility: CLINIC | Age: 66
End: 2021-12-21

## 2021-12-21 RX ORDER — BENZONATATE 100 MG/1
100 CAPSULE ORAL 3 TIMES DAILY PRN
Qty: 20 CAPSULE | Refills: 0 | Status: SHIPPED | OUTPATIENT
Start: 2021-12-21

## 2021-12-21 NOTE — TELEPHONE ENCOUNTER
Spoke with patient regarding message below. Patient states you wrote her a prescription over a year ago and she tossed the rest because she no longer needed it.

## 2021-12-21 NOTE — TELEPHONE ENCOUNTER
Spoke with patient, discussed below message. Rx sent.      Recommended PCPs sent to pt via my chart per request.

## 2021-12-21 NOTE — TELEPHONE ENCOUNTER
Pt called in to get refill on medication benzonatate 100 mg please follow up.  I do not see this medication on the chart

## 2021-12-24 ENCOUNTER — LAB ENCOUNTER (OUTPATIENT)
Dept: LAB | Facility: HOSPITAL | Age: 66
End: 2021-12-24
Attending: INTERNAL MEDICINE
Payer: COMMERCIAL

## 2021-12-24 DIAGNOSIS — E78.00 PURE HYPERCHOLESTEROLEMIA: ICD-10-CM

## 2021-12-24 PROCEDURE — 80061 LIPID PANEL: CPT

## 2021-12-24 PROCEDURE — 36415 COLL VENOUS BLD VENIPUNCTURE: CPT

## 2021-12-31 ENCOUNTER — LAB ENCOUNTER (OUTPATIENT)
Dept: LAB | Age: 66
End: 2021-12-31
Attending: INTERNAL MEDICINE
Payer: COMMERCIAL

## 2021-12-31 DIAGNOSIS — Z01.818 PRE-OP TESTING: ICD-10-CM

## 2021-12-31 LAB — SARS-COV-2 RNA RESP QL NAA+PROBE: DETECTED

## 2022-01-01 ENCOUNTER — TELEPHONE (OUTPATIENT)
Dept: GASTROENTEROLOGY | Facility: CLINIC | Age: 67
End: 2022-01-01

## 2022-01-01 DIAGNOSIS — K21.9 GASTROESOPHAGEAL REFLUX DISEASE, UNSPECIFIED WHETHER ESOPHAGITIS PRESENT: ICD-10-CM

## 2022-01-01 DIAGNOSIS — Z12.11 SCREEN FOR COLON CANCER: Primary | ICD-10-CM

## 2022-01-01 DIAGNOSIS — R10.9 RIGHT SIDED ABDOMINAL PAIN: ICD-10-CM

## 2022-01-01 NOTE — TELEPHONE ENCOUNTER
GI Schedulers -    Please call Ms Mary Rodriguez to reschedule her EGD/CLN for 4 weeks from this week. She has positive Covid swab 12/31/2021, completely asymptomatic.

## 2022-01-01 NOTE — TELEPHONE ENCOUNTER
Divine Caldwell is a patient of Dr. Jacky Quinones, tentatively scheduled for EGD/colonoscopy procedure next week 1/3/2022. Routine preoperative COVID-19 swab of 12/31/2021 has come back positive.   She saw the result and actually paged me before I could brandan

## 2022-01-03 ENCOUNTER — TELEPHONE (OUTPATIENT)
Dept: GASTROENTEROLOGY | Facility: CLINIC | Age: 67
End: 2022-01-03

## 2022-01-03 NOTE — TELEPHONE ENCOUNTER
Scheduled for:  Colonoscopy 58566 / EGD 28883   Provider Name:  Dr. Christy Reeves  Date:  2/28/2022  Location:  [facility]  Sedation:  MAC   Time: 8:00 am, arrival 7:00 am  Prep: Golytely   Meds/Allergies Reconciled?:  Physician reviewed   Diagnosis with codes:  Clyde

## 2022-01-03 NOTE — TELEPHONE ENCOUNTER
I'm not sure what she is calling about different than all the information already noted in Dr. Meena Downing note of her procedure being cancelled due to COVID, needing to reschedule and establish care with a primary physician.     Caty Sanchez MD  [facility]

## 2022-01-12 ENCOUNTER — OFFICE VISIT (OUTPATIENT)
Dept: SURGERY | Facility: CLINIC | Age: 67
End: 2022-01-12
Payer: COMMERCIAL

## 2022-01-12 VITALS
SYSTOLIC BLOOD PRESSURE: 130 MMHG | BODY MASS INDEX: 51.61 KG/M2 | WEIGHT: 256 LBS | HEIGHT: 59 IN | DIASTOLIC BLOOD PRESSURE: 80 MMHG

## 2022-01-12 DIAGNOSIS — E66.01 MORBID OBESITY WITH BMI OF 50.0-59.9, ADULT (HCC): ICD-10-CM

## 2022-01-12 DIAGNOSIS — Z51.81 ENCOUNTER FOR THERAPEUTIC DRUG MONITORING: ICD-10-CM

## 2022-01-12 DIAGNOSIS — F43.9 STRESS: ICD-10-CM

## 2022-01-12 DIAGNOSIS — I10 ESSENTIAL HYPERTENSION: ICD-10-CM

## 2022-01-12 DIAGNOSIS — E11.9 TYPE 2 DIABETES MELLITUS WITHOUT RETINOPATHY (HCC): Primary | ICD-10-CM

## 2022-01-12 PROCEDURE — 3079F DIAST BP 80-89 MM HG: CPT | Performed by: INTERNAL MEDICINE

## 2022-01-12 PROCEDURE — 3075F SYST BP GE 130 - 139MM HG: CPT | Performed by: INTERNAL MEDICINE

## 2022-01-12 PROCEDURE — 3008F BODY MASS INDEX DOCD: CPT | Performed by: INTERNAL MEDICINE

## 2022-01-12 PROCEDURE — 99214 OFFICE O/P EST MOD 30 MIN: CPT | Performed by: INTERNAL MEDICINE

## 2022-01-12 RX ORDER — PHENTERMINE HYDROCHLORIDE 37.5 MG/1
37.5 TABLET ORAL
Qty: 60 TABLET | Refills: 2 | Status: SHIPPED | OUTPATIENT
Start: 2022-01-12 | End: 2022-03-13

## 2022-01-12 NOTE — PROGRESS NOTES
[facility]  [facility]  [address] [place] [id number]  Dept: [phone]         Patient:  Eda Scales  :      10/24/1955  MRN:      [id number]    Chief Complaint:  Patient presents with:   Follow - Up: Clarice Britt 1 tablet (37.5 mg total) by mouth every morning before breakfast. 60 tablet 2   • benzonatate 100 MG Oral Cap Take 1 capsule (100 mg total) by mouth 3 (three) times daily as needed for cough.  20 capsule 0   • Lisinopril-hydroCHLOROthiazide 20-25 MG Oral Ta Packs/day: 0.50        Years: 7.00        Pack years: 3.5        Types: Cigarettes        Quit date: 2000        Years since quittin.4      Smokeless tobacco: Never Used    Vaping Use      Vaping Use: Never used    Substance and Sexual Activi Cancer Neg        Food Journal  · Reviewed and Discussed:       · Patient has a Food Journal?: no   · Patient is reading nutrition labels?   yes  · Average Caloric Intake:   unknown  · Average CHO Intake: > 120  · Is patient exercising? no  · Type of exerci symmetric  Skin: Skin color, texture, turgor normal. No rashes or lesions    ASSESSMENT     HYPERTENSION:  The patient's blood pressure has been well controlled. she has been checking it as instructed and has remained in relatively good control.     HYPERC month: 1. Keep a food log using [facility]  2. Drink 48-64 ounces of water per day. No fruit juices or regular soda. 3. Increase aerobic exercises (goal is 150 minutes per week)  4. Increase fruit and vegetable servings/day  5.  Keep carbs at or below 100g/day

## 2022-01-13 ENCOUNTER — TELEPHONE (OUTPATIENT)
Dept: SURGERY | Facility: CLINIC | Age: 67
End: 2022-01-13

## 2022-01-13 DIAGNOSIS — E03.9 HYPOTHYROIDISM, UNSPECIFIED TYPE: ICD-10-CM

## 2022-01-14 RX ORDER — LEVOTHYROXINE SODIUM 175 UG/1
TABLET ORAL
Qty: 90 TABLET | Refills: 0 | Status: SHIPPED | OUTPATIENT
Start: 2022-01-14

## 2022-02-22 NOTE — PAT NURSING NOTE
This RN spoke with patient about Phentermine instructions. Patient stated she took it on 2/21/22, but had been off of it for 4 days prior. She did not take it today 2/22/22. This RN contacted Dr. Alley Patel. Per Dr. Susanne Gilmore okay to proceed with procedure on 2/28/22.

## 2022-02-25 ENCOUNTER — TELEPHONE (OUTPATIENT)
Dept: GASTROENTEROLOGY | Facility: CLINIC | Age: 67
End: 2022-02-25

## 2022-02-25 NOTE — TELEPHONE ENCOUNTER
Contacted patient who had questions regarding prep. I thoroughly reviewed prep instructions and answered all questions. Patient verbalized understanding.

## 2022-02-28 ENCOUNTER — ANESTHESIA (OUTPATIENT)
Dept: ENDOSCOPY | Facility: HOSPITAL | Age: 67
End: 2022-02-28
Payer: COMMERCIAL

## 2022-02-28 ENCOUNTER — HOSPITAL ENCOUNTER (OUTPATIENT)
Facility: HOSPITAL | Age: 67
Setting detail: HOSPITAL OUTPATIENT SURGERY
Discharge: HOME | End: 2022-02-28
Attending: INTERNAL MEDICINE | Admitting: INTERNAL MEDICINE
Payer: COMMERCIAL

## 2022-02-28 ENCOUNTER — ANESTHESIA EVENT (OUTPATIENT)
Dept: ENDOSCOPY | Facility: HOSPITAL | Age: 67
End: 2022-02-28
Payer: COMMERCIAL

## 2022-02-28 VITALS
HEART RATE: 78 BPM | OXYGEN SATURATION: 97 % | DIASTOLIC BLOOD PRESSURE: 77 MMHG | BODY MASS INDEX: 48.38 KG/M2 | WEIGHT: 240 LBS | RESPIRATION RATE: 18 BRPM | HEIGHT: 59 IN | SYSTOLIC BLOOD PRESSURE: 158 MMHG | TEMPERATURE: 97 F

## 2022-02-28 DIAGNOSIS — R10.9 RIGHT SIDED ABDOMINAL PAIN: ICD-10-CM

## 2022-02-28 DIAGNOSIS — K21.9 GASTROESOPHAGEAL REFLUX DISEASE, UNSPECIFIED WHETHER ESOPHAGITIS PRESENT: ICD-10-CM

## 2022-02-28 DIAGNOSIS — Z12.11 SCREEN FOR COLON CANCER: ICD-10-CM

## 2022-02-28 LAB — GLUCOSE BLDC GLUCOMTR-MCNC: 160 MG/DL (ref 70–99)

## 2022-02-28 PROCEDURE — 0DB68ZX EXCISION OF STOMACH, VIA NATURAL OR ARTIFICIAL OPENING ENDOSCOPIC, DIAGNOSTIC: ICD-10-PCS | Performed by: INTERNAL MEDICINE

## 2022-02-28 PROCEDURE — 0DB98ZX EXCISION OF DUODENUM, VIA NATURAL OR ARTIFICIAL OPENING ENDOSCOPIC, DIAGNOSTIC: ICD-10-PCS | Performed by: INTERNAL MEDICINE

## 2022-02-28 PROCEDURE — 43239 EGD BIOPSY SINGLE/MULTIPLE: CPT | Performed by: INTERNAL MEDICINE

## 2022-02-28 PROCEDURE — 0DBK8ZX EXCISION OF ASCENDING COLON, VIA NATURAL OR ARTIFICIAL OPENING ENDOSCOPIC, DIAGNOSTIC: ICD-10-PCS | Performed by: INTERNAL MEDICINE

## 2022-02-28 PROCEDURE — 45385 COLONOSCOPY W/LESION REMOVAL: CPT | Performed by: INTERNAL MEDICINE

## 2022-02-28 PROCEDURE — 0DB38ZX EXCISION OF LOWER ESOPHAGUS, VIA NATURAL OR ARTIFICIAL OPENING ENDOSCOPIC, DIAGNOSTIC: ICD-10-PCS | Performed by: INTERNAL MEDICINE

## 2022-02-28 PROCEDURE — 0DBH8ZX EXCISION OF CECUM, VIA NATURAL OR ARTIFICIAL OPENING ENDOSCOPIC, DIAGNOSTIC: ICD-10-PCS | Performed by: INTERNAL MEDICINE

## 2022-02-28 PROCEDURE — 45380 COLONOSCOPY AND BIOPSY: CPT | Performed by: INTERNAL MEDICINE

## 2022-02-28 RX ORDER — LABETALOL HYDROCHLORIDE 5 MG/ML
INJECTION, SOLUTION INTRAVENOUS AS NEEDED
Status: DISCONTINUED | OUTPATIENT
Start: 2022-02-28 | End: 2022-02-28 | Stop reason: SURG

## 2022-02-28 RX ORDER — SODIUM CHLORIDE, SODIUM LACTATE, POTASSIUM CHLORIDE, CALCIUM CHLORIDE 600; 310; 30; 20 MG/100ML; MG/100ML; MG/100ML; MG/100ML
INJECTION, SOLUTION INTRAVENOUS CONTINUOUS
Status: DISCONTINUED | OUTPATIENT
Start: 2022-02-28 | End: 2022-02-28

## 2022-02-28 RX ADMIN — SODIUM CHLORIDE, SODIUM LACTATE, POTASSIUM CHLORIDE, CALCIUM CHLORIDE: 600; 310; 30; 20 INJECTION, SOLUTION INTRAVENOUS at 08:04:00

## 2022-02-28 RX ADMIN — LABETALOL HYDROCHLORIDE 5 MG: 5 INJECTION, SOLUTION INTRAVENOUS at 08:47:00

## 2022-02-28 NOTE — ANESTHESIA POSTPROCEDURE EVALUATION
Patient: Marika Ramirez    Procedure Summary     Date: 02/28/22 Room / Location: [address] [place] [address] ENDOSCOPY 01 / [address] [place] [address] ENDOSCOPY    Anesthesia Start: [address] Anesthesia Stop: 9316    Procedures:       COLONOSCOPY (N/A )      ESOPHAGOGASTRODUODENOSCOPY (EGD) (N/A ) Diagnosis:       Screen for colon cancer      Gastroesophageal reflux disease, unspecified whether esophagitis present      Right sided abdominal pain      (Esophagitis, diverticulosis, hemorrhoids, colon polyps)    Surgeons: Elian Chino MD Anesthesiologist: Chung Gong CRNA    Anesthesia Type: general ASA Status: 3          Anesthesia Type: general    PACU Vitals    /70 (02/28/22 0854)    Temp      Pulse 82 (02/28/22 0854)   Resp 23 (02/28/22 0854)    SpO2 97 % (02/28/22 0854)        [facility] AN Post Evaluation:   Patient Evaluated in PACU  Patient Participation: complete - patient participated  Level of Consciousness: sleepy but conscious  Pain Score: 0  Pain Management: adequate  Airway Patency:patent  Dental exam unchanged from preop  Yes    Cardiovascular Status: acceptable and hemodynamically stable  Respiratory Status: acceptable  Postoperative Hydration acceptable      Sarah Hammond CRNA  2/28/2022 8:55 AM

## 2022-03-01 ENCOUNTER — TELEPHONE (OUTPATIENT)
Dept: GASTROENTEROLOGY | Facility: CLINIC | Age: 67
End: 2022-03-01

## 2022-03-01 NOTE — TELEPHONE ENCOUNTER
GI staff: please place recall for colonoscopy in 5 years     Then close encounter    Thanks    Trisha Blanchard MD  [facility], [facility] - Gastroenterology  3/1/2022  10:03 AM

## 2022-03-01 NOTE — TELEPHONE ENCOUNTER
Health maintenance updated. Last colonoscopy done 2/28/22. 5 year recall placed into Pt Outreach, next due on 2/28/27 per Dr. Carolina Mcgrath.

## 2022-03-14 ENCOUNTER — OFFICE VISIT (OUTPATIENT)
Dept: INTERNAL MEDICINE CLINIC | Facility: CLINIC | Age: 67
End: 2022-03-14
Payer: COMMERCIAL

## 2022-03-14 ENCOUNTER — TELEPHONE (OUTPATIENT)
Dept: INTERNAL MEDICINE CLINIC | Facility: CLINIC | Age: 67
End: 2022-03-14

## 2022-03-14 VITALS
BODY MASS INDEX: 51.61 KG/M2 | HEART RATE: 90 BPM | RESPIRATION RATE: 14 BRPM | SYSTOLIC BLOOD PRESSURE: 130 MMHG | HEIGHT: 59 IN | WEIGHT: 256 LBS | DIASTOLIC BLOOD PRESSURE: 84 MMHG | OXYGEN SATURATION: 97 %

## 2022-03-14 DIAGNOSIS — E11.9 TYPE 2 DIABETES MELLITUS WITHOUT RETINOPATHY (HCC): Primary | ICD-10-CM

## 2022-03-14 DIAGNOSIS — I10 ESSENTIAL HYPERTENSION: ICD-10-CM

## 2022-03-14 DIAGNOSIS — E78.00 HYPERCHOLESTEROLEMIA: ICD-10-CM

## 2022-03-14 DIAGNOSIS — E03.9 HYPOTHYROIDISM, UNSPECIFIED TYPE: ICD-10-CM

## 2022-03-14 DIAGNOSIS — S76.111S: ICD-10-CM

## 2022-03-14 DIAGNOSIS — E78.00 PURE HYPERCHOLESTEROLEMIA: ICD-10-CM

## 2022-03-14 PROBLEM — R07.9 ACUTE CHEST PAIN: Status: RESOLVED | Noted: 2018-07-12 | Resolved: 2022-03-14

## 2022-03-14 PROCEDURE — 3008F BODY MASS INDEX DOCD: CPT | Performed by: INTERNAL MEDICINE

## 2022-03-14 PROCEDURE — 3075F SYST BP GE 130 - 139MM HG: CPT | Performed by: INTERNAL MEDICINE

## 2022-03-14 PROCEDURE — 3079F DIAST BP 80-89 MM HG: CPT | Performed by: INTERNAL MEDICINE

## 2022-03-14 PROCEDURE — 99214 OFFICE O/P EST MOD 30 MIN: CPT | Performed by: INTERNAL MEDICINE

## 2022-03-14 RX ORDER — LEVOTHYROXINE SODIUM 175 UG/1
175 TABLET ORAL
Qty: 90 TABLET | Refills: 0 | Status: SHIPPED | OUTPATIENT
Start: 2022-03-14 | End: 2022-03-20

## 2022-03-14 RX ORDER — CYCLOBENZAPRINE HCL 10 MG
10 TABLET ORAL 2 TIMES DAILY PRN
Qty: 14 TABLET | Refills: 0 | Status: SHIPPED | OUTPATIENT
Start: 2022-03-14 | End: 2022-03-21

## 2022-03-14 RX ORDER — METFORMIN HYDROCHLORIDE 500 MG/1
500 TABLET, EXTENDED RELEASE ORAL
Qty: 60 TABLET | Refills: 3 | Status: SHIPPED | OUTPATIENT
Start: 2022-03-14

## 2022-03-14 RX ORDER — MELOXICAM 15 MG/1
15 TABLET ORAL DAILY
Qty: 15 TABLET | Refills: 0 | Status: SHIPPED | OUTPATIENT
Start: 2022-03-14 | End: 2022-03-29

## 2022-03-14 RX ORDER — LISINOPRIL AND HYDROCHLOROTHIAZIDE 25; 20 MG/1; MG/1
1 TABLET ORAL DAILY
Qty: 90 TABLET | Refills: 0 | Status: SHIPPED | OUTPATIENT
Start: 2022-03-14

## 2022-03-14 RX ORDER — DULAGLUTIDE 1.5 MG/.5ML
1.5 INJECTION, SOLUTION SUBCUTANEOUS WEEKLY
Qty: 6 ML | Refills: 0 | Status: SHIPPED | OUTPATIENT
Start: 2022-03-14

## 2022-03-14 RX ORDER — ROSUVASTATIN CALCIUM 20 MG/1
20 TABLET, COATED ORAL DAILY
Qty: 90 TABLET | Refills: 1 | Status: SHIPPED | OUTPATIENT
Start: 2022-03-14

## 2022-03-14 NOTE — TELEPHONE ENCOUNTER
previous pt of Dr Gonsalez Spikes  Currently following up with me     Please update the PCP fileds     Thanks    Rodney

## 2022-03-15 ENCOUNTER — TELEPHONE (OUTPATIENT)
Dept: INTERNAL MEDICINE CLINIC | Facility: CLINIC | Age: 67
End: 2022-03-15

## 2022-03-15 NOTE — TELEPHONE ENCOUNTER
Patient is requesting her note for work be changed. She got the dates confused. She needs to be excused from work from 3/15/22 to 3/17/22. She will return to work on 3/18/22.      New note pending for your review and approval.

## 2022-03-16 ENCOUNTER — TELEPHONE (OUTPATIENT)
Dept: INTERNAL MEDICINE CLINIC | Facility: CLINIC | Age: 67
End: 2022-03-16

## 2022-03-16 NOTE — TELEPHONE ENCOUNTER
Patient returns call. Patient is apologetic about request to change date. Per patient, was in a lot of pain at time of office visit, wasn't thinking clearly and was optimistic that symptoms would resolve. Patient is asking to be excused from work through to Sat 3/19. Will return to work on 3/19 is her half day and should not be too difficult. Patient also requesting provider recommendations regarding bruise on back of left knee, up to back of thigh and knee, swollen and tender to walk, but can ambulate. Pt icing affected area, taking meloxicam and elevating per previous RN recommendation. Please advise.    Revised letter pended for approval

## 2022-03-16 NOTE — TELEPHONE ENCOUNTER
See messages below. No answer at home or mobile telephone number. Please reply to pool: EM RN TRIAGE - call again later to confirm pts' work note date extension request..

## 2022-03-16 NOTE — TELEPHONE ENCOUNTER
Pt request extending  her work note to be off 3/16,17,18 and return back to work Sat 19th, 2022 half day at work. Also, requesting left leg x-ray. Currently developed large bruise from back of left knee, up to back of thigh and knee, swollen and tender to walk, but can ambulate. Pt icing affected area and took meloxicam, but makes her groggy. Advised pt to continue home remedies and elevate left leg on pillow. No heavy lifting, sudden twist or turns that may aggravate the injured area. Pt verbalized understanding and agreed with plan.     Please reply to pool: CHAITANYA Hobbs

## 2022-03-16 NOTE — TELEPHONE ENCOUNTER
What is happening here? It is not acceptable to change the date like that. I had given her an option during the office visit And she gave me the date and and I put the date for her. She cannot keep on changing the dates. I have changed it once.   Please inform her    After she makes her final decision, please let me know

## 2022-03-20 ENCOUNTER — LAB ENCOUNTER (OUTPATIENT)
Dept: LAB | Facility: HOSPITAL | Age: 67
End: 2022-03-20
Attending: INTERNAL MEDICINE
Payer: COMMERCIAL

## 2022-03-20 DIAGNOSIS — E03.9 HYPOTHYROIDISM, UNSPECIFIED TYPE: ICD-10-CM

## 2022-03-20 DIAGNOSIS — F43.9 STRESS: ICD-10-CM

## 2022-03-20 DIAGNOSIS — Z51.81 ENCOUNTER FOR THERAPEUTIC DRUG MONITORING: ICD-10-CM

## 2022-03-20 DIAGNOSIS — E11.9 TYPE 2 DIABETES MELLITUS WITHOUT RETINOPATHY (HCC): ICD-10-CM

## 2022-03-20 DIAGNOSIS — E78.00 HYPERCHOLESTEROLEMIA: ICD-10-CM

## 2022-03-20 DIAGNOSIS — E66.01 MORBID OBESITY WITH BMI OF 50.0-59.9, ADULT (HCC): ICD-10-CM

## 2022-03-20 DIAGNOSIS — I10 ESSENTIAL HYPERTENSION: ICD-10-CM

## 2022-03-20 LAB
ALBUMIN SERPL-MCNC: 3.5 G/DL (ref 3.4–5)
ALBUMIN/GLOB SERPL: 0.9 {RATIO} (ref 1–2)
ALP LIVER SERPL-CCNC: 89 U/L
ALT SERPL-CCNC: 29 U/L
ANION GAP SERPL CALC-SCNC: 10 MMOL/L (ref 0–18)
AST SERPL-CCNC: 21 U/L (ref 15–37)
BILIRUB SERPL-MCNC: 1.2 MG/DL (ref 0.1–2)
BUN BLD-MCNC: 22 MG/DL (ref 7–18)
BUN/CREAT SERPL: 27.8 (ref 10–20)
CALCIUM BLD-MCNC: 8.8 MG/DL (ref 8.5–10.1)
CHLORIDE SERPL-SCNC: 102 MMOL/L (ref 98–112)
CHOLEST SERPL-MCNC: 178 MG/DL (ref ?–200)
CO2 SERPL-SCNC: 30 MMOL/L (ref 21–32)
CREAT UR-SCNC: 101 MG/DL
DEPRECATED RDW RBC AUTO: 49 FL (ref 35.1–46.3)
ERYTHROCYTE [DISTWIDTH] IN BLOOD BY AUTOMATED COUNT: 14.3 % (ref 11–15)
EST. AVERAGE GLUCOSE BLD GHB EST-MCNC: 209 MG/DL (ref 68–126)
FASTING PATIENT LIPID ANSWER: YES
FASTING STATUS PATIENT QL REPORTED: YES
GLOBULIN PLAS-MCNC: 3.7 G/DL (ref 2.8–4.4)
GLUCOSE BLD-MCNC: 195 MG/DL (ref 70–99)
HBA1C MFR BLD: 8.9 % (ref ?–5.7)
HCT VFR BLD AUTO: 43.9 %
HDLC SERPL-MCNC: 43 MG/DL (ref 40–59)
HGB BLD-MCNC: 13.9 G/DL
LDLC SERPL CALC-MCNC: 102 MG/DL (ref ?–100)
MCH RBC QN AUTO: 29.5 PG (ref 26–34)
MCHC RBC AUTO-ENTMCNC: 31.7 G/DL (ref 31–37)
MCV RBC AUTO: 93.2 FL
MICROALBUMIN UR-MCNC: 2.72 MG/DL
MICROALBUMIN/CREAT 24H UR-RTO: 26.9 UG/MG (ref ?–30)
NONHDLC SERPL-MCNC: 135 MG/DL (ref ?–130)
OSMOLALITY SERPL CALC.SUM OF ELEC: 303 MOSM/KG (ref 275–295)
PLATELET # BLD AUTO: 289 10(3)UL (ref 150–450)
POTASSIUM SERPL-SCNC: 4 MMOL/L (ref 3.5–5.1)
PROT SERPL-MCNC: 7.2 G/DL (ref 6.4–8.2)
RBC # BLD AUTO: 4.71 X10(6)UL
SODIUM SERPL-SCNC: 142 MMOL/L (ref 136–145)
T4 FREE SERPL-MCNC: 1 NG/DL (ref 0.8–1.7)
TRIGL SERPL-MCNC: 189 MG/DL (ref 30–149)
TSI SER-ACNC: 11 MIU/ML (ref 0.36–3.74)
VLDLC SERPL CALC-MCNC: 32 MG/DL (ref 0–30)
WBC # BLD AUTO: 10 X10(3) UL (ref 4–11)

## 2022-03-20 PROCEDURE — 82570 ASSAY OF URINE CREATININE: CPT

## 2022-03-20 PROCEDURE — 36415 COLL VENOUS BLD VENIPUNCTURE: CPT

## 2022-03-20 PROCEDURE — 85027 COMPLETE CBC AUTOMATED: CPT

## 2022-03-20 PROCEDURE — 82043 UR ALBUMIN QUANTITATIVE: CPT

## 2022-03-20 PROCEDURE — 3052F HG A1C>EQUAL 8.0%<EQUAL 9.0%: CPT | Performed by: INTERNAL MEDICINE

## 2022-03-20 PROCEDURE — 80053 COMPREHEN METABOLIC PANEL: CPT

## 2022-03-20 PROCEDURE — 93010 ELECTROCARDIOGRAM REPORT: CPT | Performed by: INTERNAL MEDICINE

## 2022-03-20 PROCEDURE — 83036 HEMOGLOBIN GLYCOSYLATED A1C: CPT

## 2022-03-20 PROCEDURE — 80061 LIPID PANEL: CPT

## 2022-03-20 PROCEDURE — 3061F NEG MICROALBUMINURIA REV: CPT | Performed by: INTERNAL MEDICINE

## 2022-03-20 PROCEDURE — 84443 ASSAY THYROID STIM HORMONE: CPT

## 2022-03-20 PROCEDURE — 84439 ASSAY OF FREE THYROXINE: CPT

## 2022-03-20 PROCEDURE — 93005 ELECTROCARDIOGRAM TRACING: CPT

## 2022-03-28 ENCOUNTER — TELEPHONE (OUTPATIENT)
Dept: SURGERY | Facility: CLINIC | Age: 67
End: 2022-03-28

## 2022-03-29 NOTE — TELEPHONE ENCOUNTER
Spoke to patient. Informed her that her (cardiac) test shows a slight change from previous, but no significant abnormalities. Dr. Shlomo Jason will review and let her know if she requires any further follow up. Patient denies chest pain currently. Reports some fatigue, but also had vaccine booster.

## 2022-03-31 ENCOUNTER — TELEPHONE (OUTPATIENT)
Dept: ENDOCRINOLOGY CLINIC | Facility: CLINIC | Age: 67
End: 2022-03-31

## 2022-03-31 NOTE — TELEPHONE ENCOUNTER
Lets have her see Pia Saunders first and she can discuss with Dr. Diya Swain. If AM prefers to give Matilde direction to change dose at that visit or if she needs sooner appt with AM. Thanks.

## 2022-03-31 NOTE — TELEPHONE ENCOUNTER
Dr. Nasrin Ivy     Patient LOV was with Dr. Maryellen Barnett on 6/7/21. It looks like Dr. Emilee Seth is treating her for hypothyroidism and diabetes? Please advise. Current medication:  Glimiperide 2 mg daily  Levothyroxine 2000 mcg daily  Metformin ER 1,000 mg  Trulciity 1.5 mg    Component      Latest Ref Rng & Units 3/20/2022   Glucose      70 - 99 mg/dL 195 (H)   Sodium      136 - 145 mmol/L 142   Potassium      3.5 - 5.1 mmol/L 4.0   Chloride      98 - 112 mmol/L 102   Carbon Dioxide, Total      21.0 - 32.0 mmol/L 30.0   ANION GAP      0 - 18 mmol/L 10   BUN      7 - 18 mg/dL 22 (H)   CREATININE      0.55 - 1.02 mg/dL 0.79   BUN/CREATININE RATIO      10.0 - 20.0 27.8 (H)   CALCIUM      8.5 - 10.1 mg/dL 8.8   CALCULATED OSMOLALITY      275 - 295 mOsm/kg 303 (H)   eGFR NON-AFR. AMERICAN      >=60 79   eGFR AFRICAN AMERICAN      >=60 91   ALT (SGPT)      13 - 56 U/L 29   AST (SGOT)      15 - 37 U/L 21   ALKALINE PHOSPHATASE      50 - 130 U/L 89   Total Bilirubin      0.1 - 2.0 mg/dL 1.2   PROTEIN, TOTAL      6.4 - 8.2 g/dL 7.2   Albumin      3.4 - 5.0 g/dL 3.5   Globulin      2.8 - 4.4 g/dL 3.7   A/G Ratio      1.0 - 2.0 0.9 (L)   Patient Fasting for CMP? Yes   WBC      4.0 - 11.0 x10(3) uL 10.0   RBC      3.80 - 5.30 x10(6)uL 4.71   Hemoglobin      12.0 - 16.0 g/dL 13.9   Hematocrit      35.0 - 48.0 % 43.9   MCV      80.0 - 100.0 fL 93.2   MCH      26.0 - 34.0 pg 29.5   MCHC      31.0 - 37.0 g/dL 31.7   RDW      11.0 - 15.0 % 14.3   RDW-SD      35.1 - 46.3 fL 49.0 (H)   Platelet Count      992.1 - 450.0 10(3)uL 289.0   Cholesterol, Total      <200 mg/dL 178   HDL Cholesterol      40 - 59 mg/dL 43   Triglycerides      30 - 149 mg/dL 189 (H)   LDL Cholesterol Calc      <100 mg/dL 102 (H)   VLDL      0 - 30 mg/dL 32 (H)   NON-HDL CHOLESTEROL      <130 mg/dL 135 (H)   Patient Fasting for Lipid?        Yes   MALB URINE      mg/dL 2.72   CREATININE UR RANDOM      mg/dL 101.00   MALB/CRE CALC      <=30.0 ug/mg 26.9 HEMOGLOBIN A1c      <5.7 % 8.9 (H)   ESTIMATED AVERAGE GLUCOSE      68 - 126 mg/dL 209 (H)   TSH      0.358 - 3.740 mIU/mL 11.000 (H)   T4,Free (Direct)      0.8 - 1.7 ng/dL 1.0

## 2022-03-31 NOTE — TELEPHONE ENCOUNTER
Reviewed labs - her thyroid dose does need adjustment and diabetes is not well controlled. Lets get her scheduled for sooner appt with APN to evaluate BG control. Also is she consistent with taking levothyroxine? Thank you.

## 2022-03-31 NOTE — TELEPHONE ENCOUNTER
Dr. Chisholm Books    Patient booked to see Debbie Cabrera on 4/6. She stated she takes Levothyroxine as prescribed, and is aware of high A1c. Does she need to see Dr. Gerri Sanchez soon for thyroid? Please advise. Endo staff: Also advised patient to get referral from Dr. Tanvir Mccord to be seen in clinic, patient stated she is going to contact his office.

## 2022-04-01 NOTE — TELEPHONE ENCOUNTER
Salma Campos, please see below  Patient will be seeing you in a few days  I am happy to see her two months after she sees you if you think that is necessary  Thanks

## 2022-04-04 ENCOUNTER — OFFICE VISIT (OUTPATIENT)
Dept: INTERNAL MEDICINE CLINIC | Facility: CLINIC | Age: 67
End: 2022-04-04
Payer: COMMERCIAL

## 2022-04-04 VITALS
BODY MASS INDEX: 50 KG/M2 | HEART RATE: 89 BPM | DIASTOLIC BLOOD PRESSURE: 84 MMHG | RESPIRATION RATE: 12 BRPM | WEIGHT: 248 LBS | HEIGHT: 59 IN | SYSTOLIC BLOOD PRESSURE: 136 MMHG

## 2022-04-04 DIAGNOSIS — E03.9 ACQUIRED HYPOTHYROIDISM: Primary | ICD-10-CM

## 2022-04-04 DIAGNOSIS — R94.31 ABNORMAL EKG: ICD-10-CM

## 2022-04-04 DIAGNOSIS — I25.10 CORONARY ARTERY DISEASE INVOLVING NATIVE CORONARY ARTERY OF NATIVE HEART WITHOUT ANGINA PECTORIS: ICD-10-CM

## 2022-04-04 DIAGNOSIS — R21 RASH AND NONSPECIFIC SKIN ERUPTION: ICD-10-CM

## 2022-04-04 DIAGNOSIS — G47.33 OSA (OBSTRUCTIVE SLEEP APNEA): ICD-10-CM

## 2022-04-04 DIAGNOSIS — E11.65 UNCONTROLLED TYPE 2 DIABETES MELLITUS WITH HYPERGLYCEMIA (HCC): ICD-10-CM

## 2022-04-04 PROCEDURE — 3008F BODY MASS INDEX DOCD: CPT | Performed by: INTERNAL MEDICINE

## 2022-04-04 PROCEDURE — 3079F DIAST BP 80-89 MM HG: CPT | Performed by: INTERNAL MEDICINE

## 2022-04-04 PROCEDURE — 3075F SYST BP GE 130 - 139MM HG: CPT | Performed by: INTERNAL MEDICINE

## 2022-04-04 PROCEDURE — 99214 OFFICE O/P EST MOD 30 MIN: CPT | Performed by: INTERNAL MEDICINE

## 2022-04-04 RX ORDER — TRIAMCINOLONE ACETONIDE 5 MG/G
1 CREAM TOPICAL 2 TIMES DAILY
Qty: 30 G | Refills: 0 | Status: SHIPPED | OUTPATIENT
Start: 2022-04-04

## 2022-04-06 ENCOUNTER — OFFICE VISIT (OUTPATIENT)
Dept: ENDOCRINOLOGY CLINIC | Facility: CLINIC | Age: 67
End: 2022-04-06
Payer: COMMERCIAL

## 2022-04-06 VITALS
SYSTOLIC BLOOD PRESSURE: 152 MMHG | BODY MASS INDEX: 50 KG/M2 | HEART RATE: 93 BPM | WEIGHT: 249 LBS | DIASTOLIC BLOOD PRESSURE: 94 MMHG

## 2022-04-06 DIAGNOSIS — E03.9 HYPOTHYROIDISM, UNSPECIFIED TYPE: Primary | ICD-10-CM

## 2022-04-06 DIAGNOSIS — E55.9 VITAMIN D DEFICIENCY: ICD-10-CM

## 2022-04-06 PROCEDURE — 99214 OFFICE O/P EST MOD 30 MIN: CPT

## 2022-04-06 PROCEDURE — 3077F SYST BP >= 140 MM HG: CPT

## 2022-04-06 PROCEDURE — 3080F DIAST BP >= 90 MM HG: CPT

## 2022-04-11 ENCOUNTER — OFFICE VISIT (OUTPATIENT)
Dept: SURGERY | Facility: CLINIC | Age: 67
End: 2022-04-11
Payer: COMMERCIAL

## 2022-04-11 VITALS
HEART RATE: 101 BPM | OXYGEN SATURATION: 95 % | DIASTOLIC BLOOD PRESSURE: 76 MMHG | HEIGHT: 59 IN | WEIGHT: 247 LBS | SYSTOLIC BLOOD PRESSURE: 138 MMHG | BODY MASS INDEX: 49.8 KG/M2

## 2022-04-11 DIAGNOSIS — F43.9 STRESS: ICD-10-CM

## 2022-04-11 DIAGNOSIS — E11.9 TYPE 2 DIABETES MELLITUS WITHOUT RETINOPATHY (HCC): Primary | ICD-10-CM

## 2022-04-11 DIAGNOSIS — E66.01 MORBID OBESITY WITH BMI OF 45.0-49.9, ADULT (HCC): ICD-10-CM

## 2022-04-11 DIAGNOSIS — I10 ESSENTIAL HYPERTENSION: ICD-10-CM

## 2022-04-11 DIAGNOSIS — Z51.81 ENCOUNTER FOR THERAPEUTIC DRUG MONITORING: ICD-10-CM

## 2022-04-11 DIAGNOSIS — E66.01 MORBID OBESITY WITH BMI OF 50.0-59.9, ADULT (HCC): ICD-10-CM

## 2022-04-11 PROCEDURE — 3008F BODY MASS INDEX DOCD: CPT | Performed by: INTERNAL MEDICINE

## 2022-04-11 PROCEDURE — 3075F SYST BP GE 130 - 139MM HG: CPT | Performed by: INTERNAL MEDICINE

## 2022-04-11 PROCEDURE — 3078F DIAST BP <80 MM HG: CPT | Performed by: INTERNAL MEDICINE

## 2022-04-11 PROCEDURE — 99212 OFFICE O/P EST SF 10 MIN: CPT | Performed by: INTERNAL MEDICINE

## 2022-04-11 RX ORDER — PHENTERMINE HYDROCHLORIDE 37.5 MG/1
37.5 TABLET ORAL
Qty: 60 TABLET | Refills: 2 | Status: SHIPPED | OUTPATIENT
Start: 2022-04-11 | End: 2022-06-10

## 2022-04-18 ENCOUNTER — HOSPITAL ENCOUNTER (OUTPATIENT)
Dept: CV DIAGNOSTICS | Facility: HOSPITAL | Age: 67
Discharge: HOME | End: 2022-04-18
Attending: INTERNAL MEDICINE
Payer: COMMERCIAL

## 2022-04-18 ENCOUNTER — HOSPITAL ENCOUNTER (OUTPATIENT)
Dept: NUCLEAR MEDICINE | Facility: HOSPITAL | Age: 67
Discharge: HOME | End: 2022-04-18
Attending: INTERNAL MEDICINE
Payer: COMMERCIAL

## 2022-04-18 DIAGNOSIS — R94.31 ABNORMAL EKG: ICD-10-CM

## 2022-04-18 DIAGNOSIS — I25.10 CORONARY ARTERY DISEASE INVOLVING NATIVE CORONARY ARTERY OF NATIVE HEART WITHOUT ANGINA PECTORIS: ICD-10-CM

## 2022-04-18 PROCEDURE — 93017 CV STRESS TEST TRACING ONLY: CPT | Performed by: INTERNAL MEDICINE

## 2022-04-18 PROCEDURE — 93018 CV STRESS TEST I&R ONLY: CPT | Performed by: INTERNAL MEDICINE

## 2022-04-18 PROCEDURE — 78452 HT MUSCLE IMAGE SPECT MULT: CPT | Performed by: INTERNAL MEDICINE

## 2022-04-18 PROCEDURE — 93016 CV STRESS TEST SUPVJ ONLY: CPT | Performed by: INTERNAL MEDICINE

## 2022-06-02 ENCOUNTER — LAB ENCOUNTER (OUTPATIENT)
Dept: LAB | Facility: HOSPITAL | Age: 67
End: 2022-06-02
Payer: COMMERCIAL

## 2022-06-02 ENCOUNTER — PATIENT MESSAGE (OUTPATIENT)
Dept: ENDOCRINOLOGY CLINIC | Facility: CLINIC | Age: 67
End: 2022-06-02

## 2022-06-02 DIAGNOSIS — E55.9 VITAMIN D DEFICIENCY: ICD-10-CM

## 2022-06-02 DIAGNOSIS — E03.9 HYPOTHYROIDISM, UNSPECIFIED TYPE: ICD-10-CM

## 2022-06-02 LAB
T4 FREE SERPL-MCNC: 1.6 NG/DL (ref 0.8–1.7)
TSI SER-ACNC: 0.01 MIU/ML (ref 0.36–3.74)
VIT D+METAB SERPL-MCNC: 50.6 NG/ML (ref 30–100)

## 2022-06-02 PROCEDURE — 84439 ASSAY OF FREE THYROXINE: CPT

## 2022-06-02 PROCEDURE — 84443 ASSAY THYROID STIM HORMONE: CPT

## 2022-06-02 PROCEDURE — 82306 VITAMIN D 25 HYDROXY: CPT

## 2022-06-02 PROCEDURE — 36415 COLL VENOUS BLD VENIPUNCTURE: CPT

## 2022-06-02 NOTE — TELEPHONE ENCOUNTER
Dr. Raf Vázquez    Please advise on lab results    Component      Latest Ref Rng & Units 6/2/2022   TSH      0.358 - 3.740 mIU/mL 0.012 (L)   T4,Free (Direct)      0.8 - 1.7 ng/dL 1.6   VITAMIN D, 25-OH, TOTAL      30.0 - 100.0 ng/mL 50.6

## 2022-06-02 NOTE — TELEPHONE ENCOUNTER
From: Gertrudis Priest  To: Shane Villalta MD  Sent: 6/2/2022 3:26 PM CDT  Subject: Thyroid     Hello   The thyroid issue is still going on my test results I received today are the worse that have even been and I still feel extremely tired all the time. Is there maybe any other method ot medication that could help me?   Thank you   Jing villagran

## 2022-06-03 ENCOUNTER — TELEMEDICINE (OUTPATIENT)
Dept: ENDOCRINOLOGY CLINIC | Facility: CLINIC | Age: 67
End: 2022-06-03

## 2022-06-03 DIAGNOSIS — E03.9 HYPOTHYROIDISM, UNSPECIFIED TYPE: Primary | ICD-10-CM

## 2022-06-03 DIAGNOSIS — E78.5 DYSLIPIDEMIA: ICD-10-CM

## 2022-06-03 DIAGNOSIS — E11.65 TYPE 2 DIABETES MELLITUS WITH HYPERGLYCEMIA, WITHOUT LONG-TERM CURRENT USE OF INSULIN (HCC): ICD-10-CM

## 2022-06-03 PROCEDURE — 99213 OFFICE O/P EST LOW 20 MIN: CPT | Performed by: INTERNAL MEDICINE

## 2022-06-03 RX ORDER — LEVOTHYROXINE SODIUM 88 UG/1
88 TABLET ORAL
Qty: 90 TABLET | Refills: 0 | Status: SHIPPED | OUTPATIENT
Start: 2022-06-03

## 2022-06-03 RX ORDER — LEVOTHYROXINE SODIUM 0.1 MG/1
100 TABLET ORAL
Qty: 90 TABLET | Refills: 0 | Status: SHIPPED | OUTPATIENT
Start: 2022-06-03

## 2022-06-03 NOTE — TELEPHONE ENCOUNTER
Dr. Tyshawn Dowling, [facility])  Patient agreeable to VV today at 2:30pm  FYI - patient saw Shad Dumont on 4/6/22 and has f/u on Monday, 6/6/22  Thanks

## 2022-06-03 NOTE — TELEPHONE ENCOUNTER
From: Venkatesh Pugh  Sent: 6/2/2022 8:35 PM CDT  To: Ale Sood Clinical Staff  Subject: Thyroid     I tried to get in to see Dr Ishmael Escobar but she was away on a trip . ..  2.30 pm appointment for which day?

## 2022-06-12 ENCOUNTER — PATIENT MESSAGE (OUTPATIENT)
Dept: INTERNAL MEDICINE CLINIC | Facility: CLINIC | Age: 67
End: 2022-06-12

## 2022-06-12 DIAGNOSIS — I10 ESSENTIAL HYPERTENSION: ICD-10-CM

## 2022-06-13 RX ORDER — LISINOPRIL AND HYDROCHLOROTHIAZIDE 25; 20 MG/1; MG/1
1 TABLET ORAL DAILY
Qty: 90 TABLET | Refills: 1 | Status: SHIPPED | OUTPATIENT
Start: 2022-06-13

## 2022-06-13 RX ORDER — METOPROLOL SUCCINATE 25 MG/1
25 TABLET, EXTENDED RELEASE ORAL DAILY
Qty: 90 TABLET | Refills: 1 | Status: SHIPPED | OUTPATIENT
Start: 2022-06-13

## 2022-06-13 NOTE — TELEPHONE ENCOUNTER
From: Efra Alamo  To: Melchor Sommer MD  Sent: 6/12/2022 12:18 PM CDT  Subject: Medications     Dr Derian Flynn I run out of the following maintenance meds  Metoprolol er 25mg succinate   And   Lisinopril Hctz 20/25   My pharmacy is [facility] in [address] [place] [address]  And dr Corby London used to prescribe 90 tablets quantity at a time which comes more economical   I will also try to schedule and appointment to discuss my thyroid issue and something new that showed up. But the medication I need at your earliest convenience . thank you   Efra Alamo

## 2022-06-15 ENCOUNTER — TELEPHONE (OUTPATIENT)
Dept: GASTROENTEROLOGY | Facility: CLINIC | Age: 67
End: 2022-06-15

## 2022-06-15 NOTE — TELEPHONE ENCOUNTER
Contacted Shadi with [facility] and informed him that patient is not scheduled for another colonoscopy. Unsure why referral was placed.

## 2022-06-15 NOTE — TELEPHONE ENCOUNTER
Insur Rep from Prior auth dept calling to find why is pt getting another CLN proc done, as pt. had proc done 2/28/2022.

## 2022-06-21 ENCOUNTER — HOSPITAL ENCOUNTER (OUTPATIENT)
Dept: GENERAL RADIOLOGY | Age: 67
Discharge: HOME | End: 2022-06-21
Attending: INTERNAL MEDICINE
Payer: COMMERCIAL

## 2022-06-21 ENCOUNTER — OFFICE VISIT (OUTPATIENT)
Dept: INTERNAL MEDICINE CLINIC | Facility: CLINIC | Age: 67
End: 2022-06-21
Payer: COMMERCIAL

## 2022-06-21 VITALS
WEIGHT: 242 LBS | SYSTOLIC BLOOD PRESSURE: 134 MMHG | DIASTOLIC BLOOD PRESSURE: 76 MMHG | HEART RATE: 94 BPM | BODY MASS INDEX: 48.79 KG/M2 | HEIGHT: 59 IN

## 2022-06-21 DIAGNOSIS — R05.9 COUGH: ICD-10-CM

## 2022-06-21 DIAGNOSIS — G47.33 OSA (OBSTRUCTIVE SLEEP APNEA): ICD-10-CM

## 2022-06-21 DIAGNOSIS — R29.898 PROMINENT XIPHOID: ICD-10-CM

## 2022-06-21 DIAGNOSIS — E11.65 UNCONTROLLED TYPE 2 DIABETES MELLITUS WITH HYPERGLYCEMIA (HCC): ICD-10-CM

## 2022-06-21 DIAGNOSIS — I78.1 ASYMPTOMATIC SPIDER VEINS OF BOTH LOWER EXTREMITIES: ICD-10-CM

## 2022-06-21 DIAGNOSIS — E03.9 ACQUIRED HYPOTHYROIDISM: Primary | ICD-10-CM

## 2022-06-21 LAB
CARTRIDGE LOT#: 970 NUMERIC
HEMOGLOBIN A1C: 7 % (ref 4.3–5.6)

## 2022-06-21 PROCEDURE — 3078F DIAST BP <80 MM HG: CPT | Performed by: INTERNAL MEDICINE

## 2022-06-21 PROCEDURE — 3051F HG A1C>EQUAL 7.0%<8.0%: CPT | Performed by: INTERNAL MEDICINE

## 2022-06-21 PROCEDURE — 3008F BODY MASS INDEX DOCD: CPT | Performed by: INTERNAL MEDICINE

## 2022-06-21 PROCEDURE — 3075F SYST BP GE 130 - 139MM HG: CPT | Performed by: INTERNAL MEDICINE

## 2022-06-21 PROCEDURE — 99214 OFFICE O/P EST MOD 30 MIN: CPT | Performed by: INTERNAL MEDICINE

## 2022-06-21 PROCEDURE — 83036 HEMOGLOBIN GLYCOSYLATED A1C: CPT | Performed by: INTERNAL MEDICINE

## 2022-06-21 PROCEDURE — 71046 X-RAY EXAM CHEST 2 VIEWS: CPT | Performed by: INTERNAL MEDICINE

## 2022-06-21 PROCEDURE — 71120 X-RAY EXAM BREASTBONE 2/>VWS: CPT | Performed by: INTERNAL MEDICINE

## 2022-06-21 RX ORDER — BENZONATATE 100 MG/1
100 CAPSULE ORAL 3 TIMES DAILY PRN
Qty: 30 CAPSULE | Refills: 0 | Status: SHIPPED | OUTPATIENT
Start: 2022-06-21

## 2022-06-22 ENCOUNTER — TELEPHONE (OUTPATIENT)
Dept: SURGERY | Facility: CLINIC | Age: 67
End: 2022-06-22

## 2022-06-27 ENCOUNTER — TELEPHONE (OUTPATIENT)
Dept: SURGERY | Facility: CLINIC | Age: 67
End: 2022-06-27

## 2022-06-27 DIAGNOSIS — E66.01 MORBID OBESITY WITH BMI OF 50.0-59.9, ADULT (HCC): ICD-10-CM

## 2022-06-27 RX ORDER — PHENTERMINE HYDROCHLORIDE 37.5 MG/1
37.5 TABLET ORAL
Qty: 60 TABLET | Refills: 2 | Status: SHIPPED | OUTPATIENT
Start: 2022-06-27 | End: 2022-08-26

## 2022-06-27 NOTE — TELEPHONE ENCOUNTER
Called patient per her [facility] request for earlier appt with Dr. Pantera Sawyer. Offered patient an appointment on 6/28/22. Patient declined appt due to not being able to get off work at the end of every month unless it was for an emergency. Patient has September appointment scheduled.

## 2022-07-20 ENCOUNTER — OFFICE VISIT (OUTPATIENT)
Dept: SURGERY | Facility: CLINIC | Age: 67
End: 2022-07-20
Payer: COMMERCIAL

## 2022-07-20 ENCOUNTER — PATIENT MESSAGE (OUTPATIENT)
Dept: ENDOCRINOLOGY CLINIC | Facility: CLINIC | Age: 67
End: 2022-07-20

## 2022-07-20 VITALS
WEIGHT: 243 LBS | HEIGHT: 59 IN | HEART RATE: 52 BPM | SYSTOLIC BLOOD PRESSURE: 132 MMHG | DIASTOLIC BLOOD PRESSURE: 80 MMHG | BODY MASS INDEX: 48.99 KG/M2 | OXYGEN SATURATION: 94 %

## 2022-07-20 DIAGNOSIS — Z51.81 ENCOUNTER FOR THERAPEUTIC DRUG MONITORING: ICD-10-CM

## 2022-07-20 DIAGNOSIS — E11.9 TYPE 2 DIABETES MELLITUS WITHOUT RETINOPATHY (HCC): Primary | ICD-10-CM

## 2022-07-20 DIAGNOSIS — F43.9 STRESS: ICD-10-CM

## 2022-07-20 DIAGNOSIS — E66.01 MORBID OBESITY WITH BMI OF 45.0-49.9, ADULT (HCC): ICD-10-CM

## 2022-07-20 DIAGNOSIS — I10 ESSENTIAL HYPERTENSION: ICD-10-CM

## 2022-07-20 PROCEDURE — 3079F DIAST BP 80-89 MM HG: CPT | Performed by: INTERNAL MEDICINE

## 2022-07-20 PROCEDURE — 3075F SYST BP GE 130 - 139MM HG: CPT | Performed by: INTERNAL MEDICINE

## 2022-07-20 PROCEDURE — 99214 OFFICE O/P EST MOD 30 MIN: CPT | Performed by: INTERNAL MEDICINE

## 2022-07-20 PROCEDURE — 3008F BODY MASS INDEX DOCD: CPT | Performed by: INTERNAL MEDICINE

## 2022-07-22 ENCOUNTER — PATIENT MESSAGE (OUTPATIENT)
Dept: ENDOCRINOLOGY CLINIC | Facility: CLINIC | Age: 67
End: 2022-07-22

## 2022-07-22 NOTE — TELEPHONE ENCOUNTER
Please ask patient for her BG low .  Also please confirm current DM medications    Plan per LOV; send log in a few days    Also, mounjaro is a new medication, it is an injection  I will like to review this at her next apt which should be in Sep 2022      Thanks

## 2022-07-27 RX ORDER — LEVOTHYROXINE SODIUM 88 UG/1
TABLET ORAL
Qty: 90 TABLET | Refills: 0 | Status: SHIPPED | OUTPATIENT
Start: 2022-07-27

## 2022-07-27 RX ORDER — LEVOTHYROXINE SODIUM 0.1 MG/1
TABLET ORAL
Qty: 90 TABLET | Refills: 0 | Status: SHIPPED | OUTPATIENT
Start: 2022-07-27

## 2022-08-04 ENCOUNTER — OFFICE VISIT (OUTPATIENT)
Dept: OPHTHALMOLOGY | Facility: CLINIC | Age: 67
End: 2022-08-04
Payer: COMMERCIAL

## 2022-08-04 ENCOUNTER — TELEPHONE (OUTPATIENT)
Dept: INTERNAL MEDICINE CLINIC | Facility: CLINIC | Age: 67
End: 2022-08-04

## 2022-08-04 DIAGNOSIS — E11.9 TYPE 2 DIABETES MELLITUS WITHOUT RETINOPATHY (HCC): Primary | ICD-10-CM

## 2022-08-04 DIAGNOSIS — H25.13 AGE-RELATED NUCLEAR CATARACT OF BOTH EYES: ICD-10-CM

## 2022-08-04 PROCEDURE — 92014 COMPRE OPH EXAM EST PT 1/>: CPT | Performed by: OPHTHALMOLOGY

## 2022-08-04 PROCEDURE — 2023F DILAT RTA XM W/O RTNOPTHY: CPT | Performed by: OPHTHALMOLOGY

## 2022-08-04 NOTE — ASSESSMENT & PLAN NOTE
Discussed early cataracts with patient. Told patient that cataracts are age appropriate and they are not surgical at this time. No treatment recommended at this time. Copy of glasses Rx given. Update as needed.

## 2022-08-04 NOTE — TELEPHONE ENCOUNTER
Pt has an appt with Dr Virginia Puentes on 8/04/22 that requires a referral.  Current referral is closed. Thank you.

## 2022-08-04 NOTE — TELEPHONE ENCOUNTER
Dr Lisa Rand pt is requesting a referral for Dr Sameer Gleason, pt had an appt on 8/4/2022, referral pended.

## 2022-08-04 NOTE — PATIENT INSTRUCTIONS
Age-related nuclear cataract of both eyes  Discussed early cataracts with patient. Told patient that cataracts are age appropriate and they are not surgical at this time. No treatment recommended at this time. Copy of glasses Rx given. Update as needed. Type 2 diabetes mellitus without retinopathy ([address]  Diabetes type II: no background of retinopathy, no signs of neovascularization noted. Discussed ocular and systemic benefits of blood sugar control. Diagnosis and treatment discussed in detail with patient.

## 2022-08-22 ENCOUNTER — TELEPHONE (OUTPATIENT)
Dept: ENDOCRINOLOGY CLINIC | Facility: CLINIC | Age: 67
End: 2022-08-22

## 2022-08-22 ENCOUNTER — E-VISIT (OUTPATIENT)
Dept: TELEHEALTH | Age: 67
End: 2022-08-22

## 2022-08-22 DIAGNOSIS — Z02.9 ADMINISTRATIVE ENCOUNTER: Primary | ICD-10-CM

## 2022-08-22 NOTE — TELEPHONE ENCOUNTER
pt would like to provide her sugar readings and also find out if she is able to sched a virtual video f/up appt?

## 2022-08-22 NOTE — PROGRESS NOTES
Pt submitted evisit for chronic fatigue. Pt has multiple medical conditions including T2DM, thyroid disease, HTN. Advised pt her complaint is out of scope for evisit. Referred to PCP for further eval.   No visit charge.

## 2022-08-23 NOTE — TELEPHONE ENCOUNTER
Dr. Candace Babin to patient who stated she did not have any low sugars under 70. Patient booked VV with you on 9/30. Ok to keep as video? Please advise.

## 2022-08-26 ENCOUNTER — PATIENT MESSAGE (OUTPATIENT)
Dept: INTERNAL MEDICINE CLINIC | Facility: CLINIC | Age: 67
End: 2022-08-26

## 2022-08-26 DIAGNOSIS — I10 ESSENTIAL HYPERTENSION: ICD-10-CM

## 2022-08-26 DIAGNOSIS — E66.01 MORBID OBESITY WITH BMI OF 50.0-59.9, ADULT (HCC): ICD-10-CM

## 2022-08-26 RX ORDER — LEVOTHYROXINE SODIUM 88 UG/1
88 TABLET ORAL
Qty: 90 TABLET | Refills: 0 | Status: SHIPPED | OUTPATIENT
Start: 2022-08-26

## 2022-08-26 RX ORDER — PHENTERMINE HYDROCHLORIDE 37.5 MG/1
37.5 TABLET ORAL
Qty: 60 TABLET | Refills: 2 | Status: SHIPPED | OUTPATIENT
Start: 2022-08-26 | End: 2022-10-25

## 2022-08-26 RX ORDER — LISINOPRIL AND HYDROCHLOROTHIAZIDE 25; 20 MG/1; MG/1
1 TABLET ORAL DAILY
Qty: 90 TABLET | Refills: 1 | OUTPATIENT
Start: 2022-08-26

## 2022-08-26 RX ORDER — METOPROLOL SUCCINATE 25 MG/1
25 TABLET, EXTENDED RELEASE ORAL DAILY
Qty: 90 TABLET | Refills: 1 | OUTPATIENT
Start: 2022-08-26

## 2022-08-26 RX ORDER — LEVOTHYROXINE SODIUM 0.1 MG/1
100 TABLET ORAL
Qty: 90 TABLET | Refills: 0 | Status: SHIPPED | OUTPATIENT
Start: 2022-08-26

## 2022-08-26 NOTE — TELEPHONE ENCOUNTER
Pt called back and was informed of Enma Rn message below and she verbalized understanding.  She will call her pharmacy

## 2022-08-26 NOTE — TELEPHONE ENCOUNTER
Left message to pt to call back on H# and Cell #. Also [employer] message with recommendation sent to pt, too soon for refill.

## 2022-09-26 ENCOUNTER — OFFICE VISIT (OUTPATIENT)
Dept: SURGERY | Facility: CLINIC | Age: 67
End: 2022-09-26

## 2022-09-26 VITALS
DIASTOLIC BLOOD PRESSURE: 86 MMHG | HEIGHT: 59 IN | OXYGEN SATURATION: 97 % | SYSTOLIC BLOOD PRESSURE: 138 MMHG | BODY MASS INDEX: 47.59 KG/M2 | HEART RATE: 92 BPM | WEIGHT: 236.06 LBS

## 2022-09-26 DIAGNOSIS — I10 ESSENTIAL HYPERTENSION: Primary | ICD-10-CM

## 2022-09-26 DIAGNOSIS — R63.2 BINGE EATING: ICD-10-CM

## 2022-09-26 DIAGNOSIS — E11.9 TYPE 2 DIABETES MELLITUS WITHOUT RETINOPATHY (HCC): ICD-10-CM

## 2022-09-26 DIAGNOSIS — E66.01 MORBID OBESITY WITH BMI OF 45.0-49.9, ADULT (HCC): ICD-10-CM

## 2022-09-26 DIAGNOSIS — Z51.81 ENCOUNTER FOR THERAPEUTIC DRUG MONITORING: ICD-10-CM

## 2022-09-26 DIAGNOSIS — F43.9 STRESS: ICD-10-CM

## 2022-09-26 PROCEDURE — 99214 OFFICE O/P EST MOD 30 MIN: CPT | Performed by: INTERNAL MEDICINE

## 2022-09-26 PROCEDURE — 3008F BODY MASS INDEX DOCD: CPT | Performed by: INTERNAL MEDICINE

## 2022-09-26 PROCEDURE — 3075F SYST BP GE 130 - 139MM HG: CPT | Performed by: INTERNAL MEDICINE

## 2022-09-26 PROCEDURE — 3079F DIAST BP 80-89 MM HG: CPT | Performed by: INTERNAL MEDICINE

## 2022-09-28 ENCOUNTER — LAB ENCOUNTER (OUTPATIENT)
Dept: LAB | Facility: HOSPITAL | Age: 67
End: 2022-09-28
Attending: INTERNAL MEDICINE
Payer: COMMERCIAL

## 2022-09-28 DIAGNOSIS — E11.65 TYPE 2 DIABETES MELLITUS WITH HYPERGLYCEMIA, WITHOUT LONG-TERM CURRENT USE OF INSULIN (HCC): ICD-10-CM

## 2022-09-28 DIAGNOSIS — E03.9 HYPOTHYROIDISM, UNSPECIFIED TYPE: ICD-10-CM

## 2022-09-28 LAB
EST. AVERAGE GLUCOSE BLD GHB EST-MCNC: 151 MG/DL (ref 68–126)
HBA1C MFR BLD: 6.9 % (ref ?–5.7)
T3FREE SERPL-MCNC: 2.2 PG/ML (ref 2.4–4.2)
T4 FREE SERPL-MCNC: 1.5 NG/DL (ref 0.8–1.7)
TSI SER-ACNC: 0.06 MIU/ML (ref 0.36–3.74)

## 2022-09-28 PROCEDURE — 3044F HG A1C LEVEL LT 7.0%: CPT | Performed by: NURSE PRACTITIONER

## 2022-09-28 PROCEDURE — 83036 HEMOGLOBIN GLYCOSYLATED A1C: CPT

## 2022-09-28 PROCEDURE — 84481 FREE ASSAY (FT-3): CPT

## 2022-09-28 PROCEDURE — 84443 ASSAY THYROID STIM HORMONE: CPT

## 2022-09-28 PROCEDURE — 36415 COLL VENOUS BLD VENIPUNCTURE: CPT

## 2022-09-28 PROCEDURE — 84439 ASSAY OF FREE THYROXINE: CPT

## 2022-09-30 ENCOUNTER — TELEPHONE (OUTPATIENT)
Dept: INTERNAL MEDICINE CLINIC | Facility: CLINIC | Age: 67
End: 2022-09-30

## 2022-09-30 ENCOUNTER — HOSPITAL ENCOUNTER (OUTPATIENT)
Dept: GENERAL RADIOLOGY | Age: 67
Discharge: HOME | End: 2022-09-30
Attending: NURSE PRACTITIONER
Payer: COMMERCIAL

## 2022-09-30 ENCOUNTER — TELEMEDICINE (OUTPATIENT)
Dept: ENDOCRINOLOGY CLINIC | Facility: CLINIC | Age: 67
End: 2022-09-30

## 2022-09-30 ENCOUNTER — OFFICE VISIT (OUTPATIENT)
Dept: INTERNAL MEDICINE CLINIC | Facility: CLINIC | Age: 67
End: 2022-09-30

## 2022-09-30 VITALS
SYSTOLIC BLOOD PRESSURE: 158 MMHG | BODY MASS INDEX: 48.38 KG/M2 | WEIGHT: 240 LBS | HEART RATE: 74 BPM | DIASTOLIC BLOOD PRESSURE: 87 MMHG | HEIGHT: 59 IN

## 2022-09-30 DIAGNOSIS — M25.511 ACUTE PAIN OF RIGHT SHOULDER: ICD-10-CM

## 2022-09-30 DIAGNOSIS — E11.69 TYPE 2 DIABETES MELLITUS WITH OTHER SPECIFIED COMPLICATION, WITHOUT LONG-TERM CURRENT USE OF INSULIN (HCC): ICD-10-CM

## 2022-09-30 DIAGNOSIS — M79.641 RIGHT HAND PAIN: ICD-10-CM

## 2022-09-30 DIAGNOSIS — E78.5 DYSLIPIDEMIA: ICD-10-CM

## 2022-09-30 DIAGNOSIS — E03.9 HYPOTHYROIDISM, UNSPECIFIED TYPE: Primary | ICD-10-CM

## 2022-09-30 DIAGNOSIS — M79.641 RIGHT HAND PAIN: Primary | ICD-10-CM

## 2022-09-30 PROCEDURE — 73130 X-RAY EXAM OF HAND: CPT | Performed by: NURSE PRACTITIONER

## 2022-09-30 PROCEDURE — 3079F DIAST BP 80-89 MM HG: CPT | Performed by: NURSE PRACTITIONER

## 2022-09-30 PROCEDURE — 3077F SYST BP >= 140 MM HG: CPT | Performed by: NURSE PRACTITIONER

## 2022-09-30 PROCEDURE — 3008F BODY MASS INDEX DOCD: CPT | Performed by: NURSE PRACTITIONER

## 2022-09-30 PROCEDURE — 73030 X-RAY EXAM OF SHOULDER: CPT | Performed by: NURSE PRACTITIONER

## 2022-09-30 PROCEDURE — 99213 OFFICE O/P EST LOW 20 MIN: CPT | Performed by: NURSE PRACTITIONER

## 2022-09-30 RX ORDER — LEVOTHYROXINE SODIUM 175 UG/1
175 TABLET ORAL
Qty: 90 TABLET | Refills: 0 | Status: SHIPPED | OUTPATIENT
Start: 2022-09-30

## 2022-09-30 RX ORDER — METFORMIN HYDROCHLORIDE 500 MG/1
500 TABLET, EXTENDED RELEASE ORAL
Qty: 180 TABLET | Refills: 0 | Status: SHIPPED | OUTPATIENT
Start: 2022-09-30

## 2022-09-30 RX ORDER — TIRZEPATIDE 2.5 MG/.5ML
2.5 INJECTION, SOLUTION SUBCUTANEOUS WEEKLY
Qty: 2 ML | Refills: 0 | Status: SHIPPED | OUTPATIENT
Start: 2022-09-30

## 2022-10-04 ENCOUNTER — TELEPHONE (OUTPATIENT)
Dept: ENDOCRINOLOGY CLINIC | Facility: CLINIC | Age: 67
End: 2022-10-04

## 2022-10-13 ENCOUNTER — OFFICE VISIT (OUTPATIENT)
Dept: INTERNAL MEDICINE CLINIC | Facility: CLINIC | Age: 67
End: 2022-10-13
Payer: COMMERCIAL

## 2022-10-13 VITALS
OXYGEN SATURATION: 98 % | DIASTOLIC BLOOD PRESSURE: 76 MMHG | HEIGHT: 59 IN | BODY MASS INDEX: 47.58 KG/M2 | TEMPERATURE: 98 F | SYSTOLIC BLOOD PRESSURE: 124 MMHG | WEIGHT: 236 LBS | HEART RATE: 102 BPM

## 2022-10-13 DIAGNOSIS — I25.10 CORONARY ARTERY DISEASE INVOLVING NATIVE CORONARY ARTERY OF NATIVE HEART WITHOUT ANGINA PECTORIS: ICD-10-CM

## 2022-10-13 DIAGNOSIS — E03.9 ACQUIRED HYPOTHYROIDISM: ICD-10-CM

## 2022-10-13 DIAGNOSIS — M54.50 CHRONIC BILATERAL LOW BACK PAIN, UNSPECIFIED WHETHER SCIATICA PRESENT: ICD-10-CM

## 2022-10-13 DIAGNOSIS — G89.29 CHRONIC BILATERAL LOW BACK PAIN, UNSPECIFIED WHETHER SCIATICA PRESENT: ICD-10-CM

## 2022-10-13 DIAGNOSIS — I10 ESSENTIAL HYPERTENSION: ICD-10-CM

## 2022-10-13 DIAGNOSIS — Z00.00 ANNUAL PHYSICAL EXAM: Primary | ICD-10-CM

## 2022-10-13 DIAGNOSIS — Z12.31 ENCOUNTER FOR SCREENING MAMMOGRAM FOR BREAST CANCER: ICD-10-CM

## 2022-10-13 DIAGNOSIS — J44.9 CHRONIC OBSTRUCTIVE PULMONARY DISEASE, UNSPECIFIED COPD TYPE (HCC): ICD-10-CM

## 2022-10-13 DIAGNOSIS — E66.01 MORBID OBESITY WITH BMI OF 45.0-49.9, ADULT (HCC): ICD-10-CM

## 2022-10-13 DIAGNOSIS — E78.00 PURE HYPERCHOLESTEROLEMIA: ICD-10-CM

## 2022-10-13 DIAGNOSIS — E03.9 HYPOTHYROIDISM, UNSPECIFIED TYPE: ICD-10-CM

## 2022-10-13 DIAGNOSIS — E11.9 TYPE 2 DIABETES MELLITUS WITHOUT RETINOPATHY (HCC): ICD-10-CM

## 2022-10-13 PROCEDURE — 3078F DIAST BP <80 MM HG: CPT | Performed by: INTERNAL MEDICINE

## 2022-10-13 PROCEDURE — 99396 PREV VISIT EST AGE 40-64: CPT | Performed by: INTERNAL MEDICINE

## 2022-10-13 PROCEDURE — 3008F BODY MASS INDEX DOCD: CPT | Performed by: INTERNAL MEDICINE

## 2022-10-13 PROCEDURE — 3074F SYST BP LT 130 MM HG: CPT | Performed by: INTERNAL MEDICINE

## 2022-10-13 RX ORDER — TRIAMCINOLONE ACETONIDE 5 MG/G
1 CREAM TOPICAL 2 TIMES DAILY
Qty: 30 G | Refills: 0 | Status: SHIPPED | OUTPATIENT
Start: 2022-10-13

## 2022-10-13 RX ORDER — ROSUVASTATIN CALCIUM 20 MG/1
20 TABLET, COATED ORAL DAILY
Qty: 90 TABLET | Refills: 1 | Status: SHIPPED | OUTPATIENT
Start: 2022-10-13

## 2022-10-21 DIAGNOSIS — E66.01 MORBID OBESITY WITH BMI OF 50.0-59.9, ADULT (HCC): ICD-10-CM

## 2022-10-21 RX ORDER — PHENTERMINE HYDROCHLORIDE 37.5 MG/1
37.5 TABLET ORAL
Qty: 30 TABLET | Refills: 2 | Status: SHIPPED | OUTPATIENT
Start: 2022-10-21 | End: 2022-11-20

## 2022-10-25 ENCOUNTER — TELEPHONE (OUTPATIENT)
Dept: SURGERY | Facility: CLINIC | Age: 67
End: 2022-10-25

## 2022-10-25 NOTE — TELEPHONE ENCOUNTER
Good rx savings card applied to Phentermine medication. Out of pocket cost per W/G is $17.52.  No P.A initiated

## 2022-10-27 RX ORDER — TIRZEPATIDE 2.5 MG/.5ML
2.5 INJECTION, SOLUTION SUBCUTANEOUS WEEKLY
Qty: 2 ML | Refills: 0 | Status: SHIPPED | OUTPATIENT
Start: 2022-10-27

## 2022-11-14 ENCOUNTER — NURSE TRIAGE (OUTPATIENT)
Dept: INTERNAL MEDICINE CLINIC | Facility: CLINIC | Age: 67
End: 2022-11-14

## 2022-11-25 ENCOUNTER — LAB ENCOUNTER (OUTPATIENT)
Dept: LAB | Facility: HOSPITAL | Age: 67
End: 2022-11-25
Attending: INTERNAL MEDICINE
Payer: COMMERCIAL

## 2022-11-25 ENCOUNTER — TELEPHONE (OUTPATIENT)
Dept: ENDOCRINOLOGY CLINIC | Facility: CLINIC | Age: 67
End: 2022-11-25

## 2022-11-25 DIAGNOSIS — E03.9 HYPOTHYROIDISM, UNSPECIFIED TYPE: ICD-10-CM

## 2022-11-25 DIAGNOSIS — E03.9 HYPOTHYROIDISM, UNSPECIFIED TYPE: Primary | ICD-10-CM

## 2022-11-25 DIAGNOSIS — E78.5 DYSLIPIDEMIA: ICD-10-CM

## 2022-11-25 LAB
LDLC SERPL DIRECT ASSAY-MCNC: 98 MG/DL (ref ?–100)
T4 FREE SERPL-MCNC: 1.6 NG/DL (ref 0.8–1.7)
TSI SER-ACNC: <0.005 MIU/ML (ref 0.36–3.74)

## 2022-11-25 PROCEDURE — 84443 ASSAY THYROID STIM HORMONE: CPT

## 2022-11-25 PROCEDURE — 83721 ASSAY OF BLOOD LIPOPROTEIN: CPT

## 2022-11-25 PROCEDURE — 36415 COLL VENOUS BLD VENIPUNCTURE: CPT

## 2022-11-25 PROCEDURE — 84439 ASSAY OF FREE THYROXINE: CPT

## 2022-11-25 RX ORDER — LEVOTHYROXINE SODIUM 137 UG/1
137 TABLET ORAL
Qty: 90 TABLET | Refills: 0 | Status: SHIPPED | OUTPATIENT
Start: 2022-11-25

## 2022-11-25 NOTE — TELEPHONE ENCOUNTER
TSH is very low and lower than last time ( undetectable)  Please hold LT4 for 5 days and then resume at a lower dose of 137 mcg daily  Please check for any symptoms of hyperthyroidism like unintentional weight loss, anxiety, heart racing, diarrhea  TSh with reflex in 6 weeks  Also, please make sure that she is not on any biotin supplements  Thanks

## 2022-11-25 NOTE — TELEPHONE ENCOUNTER
Dr. Destinee Warren     Patient verbalized understanding and acknowledged. Patient read-back recommendation. Sometimes heart racing once a week. She has felt these episodes listed below:     1st time: In August when she was in [place] [address] 20 min  2nd time: Mid of September- less than 20 min   3rd time:  Last Saturday-shorter than other encounters. Last couple of months, have not been feeling tired, which is happy about  \"Best I've ever been\"     Lab orders and Rx sent     Patient is not taking bioton supplements. As of today, patient is feeling fine, feeling awake and more active than she has ever been.

## 2022-11-28 ENCOUNTER — PATIENT MESSAGE (OUTPATIENT)
Dept: ENDOCRINOLOGY CLINIC | Facility: CLINIC | Age: 67
End: 2022-11-28

## 2022-11-28 RX ORDER — TIRZEPATIDE 2.5 MG/.5ML
2.5 INJECTION, SOLUTION SUBCUTANEOUS WEEKLY
Qty: 2 ML | Refills: 0 | Status: SHIPPED | OUTPATIENT
Start: 2022-11-28

## 2022-11-28 NOTE — TELEPHONE ENCOUNTER
From: Anali Lopez  To: Fiorella Ulloa MD  Sent: 11/28/2022 12:49 PM CST  Subject: Avril Kapadia refill    I was wondering if you could pls refill the mounjaro injections as I ve completed 4 weeks of the medication   Thank you

## 2022-11-28 NOTE — TELEPHONE ENCOUNTER
From: Renetta Elena  To: Dave Abraham MD  Sent: 11/28/2022 12:49 PM CST  Subject: Mojgan Slade refill    I was wondering if you could pls refill the mounjaro injections as I ve completed 4 weeks of the medication   Thank you

## 2022-11-29 NOTE — TELEPHONE ENCOUNTER
Spoke to pharmacist at [facility] - she stated savings card is in patient's profile - pharmacist stated it takes some time to process claim  Pharmacist recommended patient contact them later today for update  [facility] sent to patient relaying message above - patient advised to contact clinic with questions

## 2022-12-12 ENCOUNTER — HOSPITAL ENCOUNTER (OUTPATIENT)
Dept: MAMMOGRAPHY | Age: 67
Discharge: HOME | End: 2022-12-12
Attending: INTERNAL MEDICINE
Payer: COMMERCIAL

## 2022-12-12 DIAGNOSIS — Z12.31 ENCOUNTER FOR SCREENING MAMMOGRAM FOR BREAST CANCER: ICD-10-CM

## 2022-12-12 PROCEDURE — 77067 SCR MAMMO BI INCL CAD: CPT | Performed by: INTERNAL MEDICINE

## 2022-12-12 PROCEDURE — 77063 BREAST TOMOSYNTHESIS BI: CPT | Performed by: INTERNAL MEDICINE

## 2022-12-27 DIAGNOSIS — I10 ESSENTIAL HYPERTENSION: ICD-10-CM

## 2022-12-27 RX ORDER — METOPROLOL SUCCINATE 25 MG/1
25 TABLET, EXTENDED RELEASE ORAL DAILY
Qty: 90 TABLET | Refills: 1 | Status: SHIPPED | OUTPATIENT
Start: 2022-12-27

## 2022-12-27 RX ORDER — LISINOPRIL AND HYDROCHLOROTHIAZIDE 25; 20 MG/1; MG/1
1 TABLET ORAL DAILY
Qty: 90 TABLET | Refills: 1 | Status: SHIPPED | OUTPATIENT
Start: 2022-12-27

## 2023-01-09 ENCOUNTER — TELEPHONE (OUTPATIENT)
Dept: ENDOCRINOLOGY CLINIC | Facility: CLINIC | Age: 68
End: 2023-01-09

## 2023-01-09 ENCOUNTER — LAB ENCOUNTER (OUTPATIENT)
Dept: LAB | Facility: HOSPITAL | Age: 68
End: 2023-01-09
Attending: INTERNAL MEDICINE
Payer: COMMERCIAL

## 2023-01-09 DIAGNOSIS — E03.9 HYPOTHYROIDISM, UNSPECIFIED TYPE: Primary | ICD-10-CM

## 2023-01-09 DIAGNOSIS — E03.9 HYPOTHYROIDISM, UNSPECIFIED TYPE: ICD-10-CM

## 2023-01-09 LAB — TSI SER-ACNC: 0.09 MIU/ML (ref 0.36–3.74)

## 2023-01-09 PROCEDURE — 36415 COLL VENOUS BLD VENIPUNCTURE: CPT

## 2023-01-09 PROCEDURE — 84443 ASSAY THYROID STIM HORMONE: CPT

## 2023-01-09 RX ORDER — LEVOTHYROXINE SODIUM 0.12 MG/1
125 TABLET ORAL
Qty: 90 TABLET | Refills: 0 | Status: SHIPPED | OUTPATIENT
Start: 2023-01-09

## 2023-01-09 NOTE — TELEPHONE ENCOUNTER
TSH has improved but continues to be low  LT4 137 --> decrease to 125 mcg daily   TSH with reflex before Feb 2022 apt   Please hold any supplments for 5 days prior to testing   Thanks

## 2023-01-23 ENCOUNTER — PATIENT MESSAGE (OUTPATIENT)
Dept: ENDOCRINOLOGY CLINIC | Facility: CLINIC | Age: 68
End: 2023-01-23

## 2023-01-23 RX ORDER — TIRZEPATIDE 5 MG/.5ML
5 INJECTION, SOLUTION SUBCUTANEOUS
Qty: 2 ML | Refills: 1 | Status: SHIPPED | OUTPATIENT
Start: 2023-01-23

## 2023-01-23 NOTE — TELEPHONE ENCOUNTER
From: Waldo Garza  To: Shereen Owen MD  Sent: 1/23/2023 3:08 PM CST  Subject: Silvia Vázquez   As I am in need for a refill of the above medication again,  I was wondering about the [facility] dosage. .  Currently I am on the lowest dosage for long do I stay there , and when do we increase to ie 5%   Thank you

## 2023-01-23 NOTE — TELEPHONE ENCOUNTER
Dr Raf Vázquez,    Just wanted to confirm- Pt is taking [facility] 2.5mg/weekly and you would like to increase to 7.5mg/weekly until appt with you 2/27?

## 2023-01-27 ENCOUNTER — TELEPHONE (OUTPATIENT)
Dept: ENDOCRINOLOGY CLINIC | Facility: CLINIC | Age: 68
End: 2023-01-27

## 2023-01-27 RX ORDER — TIRZEPATIDE 5 MG/.5ML
5 INJECTION, SOLUTION SUBCUTANEOUS
Qty: 2 ML | Refills: 1 | Status: SHIPPED | OUTPATIENT
Start: 2023-01-27

## 2023-01-27 NOTE — TELEPHONE ENCOUNTER
Howard requesting new rx for [facility] with OCD 10 dx code. Please call - states patient is trying to  rx. Thank you.

## 2023-01-27 NOTE — TELEPHONE ENCOUNTER
Called patient back, pharmacy needed new Rx with diagnosis code. Rx sent per protocol. RN advised patient follow up with pharmacy since Rx was sent during phone call.

## 2023-02-06 ENCOUNTER — TELEPHONE (OUTPATIENT)
Dept: ENDOCRINOLOGY CLINIC | Facility: CLINIC | Age: 68
End: 2023-02-06

## 2023-02-06 NOTE — TELEPHONE ENCOUNTER
Called patient to inquire regarding below. Pt states she is going for blood work next week since dose was adjusted the last time. Her concern is reading on the medical journal that levothyroxine is being recalled. Dr. Carmen To -- please see above and advise if you've heard anything about a recall. Pt said she can forward it if need be. Thank you.

## 2023-02-06 NOTE — TELEPHONE ENCOUNTER
Pt feels that the levothyroxine she is currently on is not working. Pt feeling increased fatigue. She states that there is some sort of recall. Please call.

## 2023-02-08 ENCOUNTER — TELEPHONE (OUTPATIENT)
Dept: ENDOCRINOLOGY CLINIC | Facility: CLINIC | Age: 68
End: 2023-02-08

## 2023-02-08 NOTE — TELEPHONE ENCOUNTER
Tirzepatide [facility]) 5 MG/0.5ML Subcutaneous Solution Pen-injector, Inject 5 mg into the skin every 7 days. , Disp: 2 mL, Rfl: 1    MESSAGE:   Plan preferred alternatives: KATHLEEN Garcia OR [facility]. OR CALL PLAN [phone] TO INITIATE A PA.  PATIENT ID IS [id number] TO PROVIDE A NEW PRESCRIPTION, CALL STORE AT AND CHANGE MEDICATION [phone]

## 2023-02-09 NOTE — TELEPHONE ENCOUNTER
Called patient and informed her that the recalled medication is tirostint oral solution. This was confirmed by the pharmacist of another patient and FDA website. She has an appointment with Dr. Kiley Aguirre this month and states she will discuss further.

## 2023-02-09 NOTE — TELEPHONE ENCOUNTER
Medication  CGM  pump supply Requested: Tirzepatide [facility]) 5 MG/0.5ML Subcutaneous Solution Pen-injector    Sig: Inject 5 mg into the skin every 7 days    DX Code: E11.69                                       Case Number/Pending Ref#:

## 2023-02-11 ENCOUNTER — LAB ENCOUNTER (OUTPATIENT)
Dept: LAB | Facility: HOSPITAL | Age: 68
End: 2023-02-11
Attending: INTERNAL MEDICINE
Payer: COMMERCIAL

## 2023-02-11 DIAGNOSIS — E03.9 HYPOTHYROIDISM, UNSPECIFIED TYPE: ICD-10-CM

## 2023-02-11 LAB — TSI SER-ACNC: 1.69 MIU/ML (ref 0.36–3.74)

## 2023-02-11 PROCEDURE — 36415 COLL VENOUS BLD VENIPUNCTURE: CPT

## 2023-02-11 PROCEDURE — 84443 ASSAY THYROID STIM HORMONE: CPT

## 2023-02-13 ENCOUNTER — OFFICE VISIT (OUTPATIENT)
Dept: SURGERY | Facility: CLINIC | Age: 68
End: 2023-02-13
Payer: COMMERCIAL

## 2023-02-13 VITALS
SYSTOLIC BLOOD PRESSURE: 136 MMHG | HEIGHT: 59 IN | WEIGHT: 229.31 LBS | OXYGEN SATURATION: 95 % | BODY MASS INDEX: 46.23 KG/M2 | DIASTOLIC BLOOD PRESSURE: 88 MMHG | HEART RATE: 89 BPM

## 2023-02-13 DIAGNOSIS — Z51.81 ENCOUNTER FOR THERAPEUTIC DRUG MONITORING: ICD-10-CM

## 2023-02-13 DIAGNOSIS — E11.9 TYPE 2 DIABETES MELLITUS WITHOUT RETINOPATHY (HCC): Primary | ICD-10-CM

## 2023-02-13 DIAGNOSIS — F43.9 STRESS: ICD-10-CM

## 2023-02-13 DIAGNOSIS — E66.01 MORBID OBESITY WITH BMI OF 45.0-49.9, ADULT (HCC): ICD-10-CM

## 2023-02-13 PROCEDURE — 3075F SYST BP GE 130 - 139MM HG: CPT | Performed by: INTERNAL MEDICINE

## 2023-02-13 PROCEDURE — 99214 OFFICE O/P EST MOD 30 MIN: CPT | Performed by: INTERNAL MEDICINE

## 2023-02-13 PROCEDURE — 3008F BODY MASS INDEX DOCD: CPT | Performed by: INTERNAL MEDICINE

## 2023-02-13 PROCEDURE — 3079F DIAST BP 80-89 MM HG: CPT | Performed by: INTERNAL MEDICINE

## 2023-02-13 RX ORDER — PHENTERMINE HYDROCHLORIDE 37.5 MG/1
37.5 TABLET ORAL
Qty: 30 TABLET | Refills: 2 | Status: SHIPPED | OUTPATIENT
Start: 2023-02-13 | End: 2023-03-15

## 2023-02-17 NOTE — TELEPHONE ENCOUNTER
Called [employer] at [phone], spoke with [facility], pending review, reference number [id number].

## 2023-02-20 ENCOUNTER — OFFICE VISIT (OUTPATIENT)
Dept: INTERNAL MEDICINE CLINIC | Facility: CLINIC | Age: 68
End: 2023-02-20

## 2023-02-20 VITALS
WEIGHT: 226 LBS | SYSTOLIC BLOOD PRESSURE: 135 MMHG | HEART RATE: 83 BPM | BODY MASS INDEX: 45.56 KG/M2 | HEIGHT: 59 IN | DIASTOLIC BLOOD PRESSURE: 85 MMHG

## 2023-02-20 DIAGNOSIS — J02.9 PHARYNGITIS, UNSPECIFIED ETIOLOGY: Primary | ICD-10-CM

## 2023-02-20 LAB
CONTROL LINE PRESENT WITH A CLEAR BACKGROUND (YES/NO): YES YES/NO
KIT LOT #: NORMAL NUMERIC
STREP GRP A CUL-SCR: NEGATIVE

## 2023-02-20 PROCEDURE — 87880 STREP A ASSAY W/OPTIC: CPT | Performed by: NURSE PRACTITIONER

## 2023-02-20 PROCEDURE — 99213 OFFICE O/P EST LOW 20 MIN: CPT | Performed by: NURSE PRACTITIONER

## 2023-02-20 PROCEDURE — 3079F DIAST BP 80-89 MM HG: CPT | Performed by: NURSE PRACTITIONER

## 2023-02-20 PROCEDURE — 3008F BODY MASS INDEX DOCD: CPT | Performed by: NURSE PRACTITIONER

## 2023-02-20 PROCEDURE — 3075F SYST BP GE 130 - 139MM HG: CPT | Performed by: NURSE PRACTITIONER

## 2023-02-20 RX ORDER — CLOBETASOL PROPIONATE 0.5 MG/G
OINTMENT TOPICAL
COMMUNITY
Start: 2022-12-15

## 2023-02-20 NOTE — TELEPHONE ENCOUNTER
Received fax from [address] [place] [address],[address] requesting recent chart notes for the request of the medication MOUNJARO 5MG/0.5ML SOLUTION PEN-INJECTOR.  Faxing over telemedicine 09/30/22 to [phone]  Awaiting fax confirmation    ID# [id number]  CASE# [id number]

## 2023-02-21 LAB — SARS-COV-2 RNA RESP QL NAA+PROBE: NOT DETECTED

## 2023-02-24 ENCOUNTER — PATIENT MESSAGE (OUTPATIENT)
Dept: ENDOCRINOLOGY CLINIC | Facility: CLINIC | Age: 68
End: 2023-02-24

## 2023-02-24 RX ORDER — TIRZEPATIDE 7.5 MG/.5ML
7.5 INJECTION, SOLUTION SUBCUTANEOUS WEEKLY
Qty: 2 ML | Refills: 0 | Status: SHIPPED | OUTPATIENT
Start: 2023-02-24 | End: 2023-02-27

## 2023-02-24 NOTE — TELEPHONE ENCOUNTER
From: Eda Scales  To: Mone Vanessa MD  Sent: 2/24/2023 10:19 AM CST  Subject: Karine Mandes REFILL    I hate to bother you each month but I used my mounjaro injection meds and I am in need of a new prescription. .  I was wondering if you could increase the dosage to 7 MG. ?  I have scheduled an upcoming appointment for Monday the 27th    Thank you so much   Eda Scales   [phone]

## 2023-02-27 ENCOUNTER — OFFICE VISIT (OUTPATIENT)
Dept: ENDOCRINOLOGY CLINIC | Facility: CLINIC | Age: 68
End: 2023-02-27

## 2023-02-27 VITALS
BODY MASS INDEX: 46 KG/M2 | WEIGHT: 228 LBS | SYSTOLIC BLOOD PRESSURE: 136 MMHG | DIASTOLIC BLOOD PRESSURE: 86 MMHG | HEART RATE: 87 BPM

## 2023-02-27 DIAGNOSIS — E66.01 MORBID OBESITY WITH BMI OF 45.0-49.9, ADULT (HCC): ICD-10-CM

## 2023-02-27 DIAGNOSIS — E78.5 DYSLIPIDEMIA: ICD-10-CM

## 2023-02-27 DIAGNOSIS — E03.9 HYPOTHYROIDISM, UNSPECIFIED TYPE: ICD-10-CM

## 2023-02-27 DIAGNOSIS — E11.69 TYPE 2 DIABETES MELLITUS WITH OTHER SPECIFIED COMPLICATION, WITHOUT LONG-TERM CURRENT USE OF INSULIN (HCC): Primary | ICD-10-CM

## 2023-02-27 LAB
CARTRIDGE LOT#: ABNORMAL NUMERIC
GLUCOSE BLOOD: 118
HEMOGLOBIN A1C: 6.2 % (ref 4.3–5.6)
TEST STRIP LOT #: NORMAL NUMERIC

## 2023-02-27 PROCEDURE — 82947 ASSAY GLUCOSE BLOOD QUANT: CPT | Performed by: INTERNAL MEDICINE

## 2023-02-27 PROCEDURE — 83036 HEMOGLOBIN GLYCOSYLATED A1C: CPT | Performed by: INTERNAL MEDICINE

## 2023-02-27 PROCEDURE — 3079F DIAST BP 80-89 MM HG: CPT | Performed by: INTERNAL MEDICINE

## 2023-02-27 PROCEDURE — 3075F SYST BP GE 130 - 139MM HG: CPT | Performed by: INTERNAL MEDICINE

## 2023-02-27 PROCEDURE — 3044F HG A1C LEVEL LT 7.0%: CPT | Performed by: INTERNAL MEDICINE

## 2023-02-27 PROCEDURE — 99214 OFFICE O/P EST MOD 30 MIN: CPT | Performed by: INTERNAL MEDICINE

## 2023-02-27 RX ORDER — TIRZEPATIDE 10 MG/.5ML
10 INJECTION, SOLUTION SUBCUTANEOUS
Qty: 6 ML | Refills: 0 | Status: SHIPPED | OUTPATIENT
Start: 2023-02-27

## 2023-03-02 ENCOUNTER — TELEPHONE (OUTPATIENT)
Dept: INTERNAL MEDICINE CLINIC | Facility: CLINIC | Age: 68
End: 2023-03-02

## 2023-03-02 DIAGNOSIS — J32.9 SINUSITIS, UNSPECIFIED CHRONICITY, UNSPECIFIED LOCATION: Primary | ICD-10-CM

## 2023-03-02 RX ORDER — AMOXICILLIN 500 MG/1
500 CAPSULE ORAL 3 TIMES DAILY
Qty: 21 CAPSULE | Refills: 0 | Status: SHIPPED | OUTPATIENT
Start: 2023-03-02 | End: 2023-03-09

## 2023-03-02 NOTE — TELEPHONE ENCOUNTER
Spoke with pt,  verified, she stated she was seen last week 23 by Anna DUNCAN. Today she is worse, more stuffy, congested, tonsils are swollen, she can't swallow, able to speak clear sentences . Pt takes advil sinus plus every 12 hours, hot tea, soup, not helping, she can't even eat. Pt was advised nasal saline, tylenol, humidifier, hydrate, lozenges, salt water gargle, steam shower. Pt stated understanding. Pt req abx if possible. pls advise, thanks in advance.            Future Appointments   Date Time Provider Toney Pritchetti   3/9/2023  1:45 PM Candelaria Oviedo MD [facility] [place]   2023 10:30 AM Cortes Sanders MD [address] [place] [address]

## 2023-03-02 NOTE — TELEPHONE ENCOUNTER
Spoke with pt,  verified  Pt was informed of Gay[facility] [address] recommendation, pt stated understanding. Pedro Reveal

## 2023-03-03 ENCOUNTER — TELEPHONE (OUTPATIENT)
Dept: ENDOCRINOLOGY CLINIC | Facility: CLINIC | Age: 68
End: 2023-03-03

## 2023-03-03 NOTE — TELEPHONE ENCOUNTER
Tirzepatide [facility]) 10 MG/0.5ML Subcutaneous Solution Pen-injector, Inject 10 mg into the skin every 7 days. , Disp: 6 mL, Rfl: 0    MESSAGE: PLAN PREFERRED ALTS:   Mary Mario         Message:   Plan does not cover the medication. Please call plan at [phone] to initiate a prior authorization or call/fax pharmacy to change medication.  Patient ID is [id number]

## 2023-03-03 NOTE — TELEPHONE ENCOUNTER
Medication  CGM  pump supply Requested: Tirzepatide (MOUNJARO) 10 MG/0.5ML Subcutaneous Solution Pen-injector                                                           CoverMyMeds Used: N/A    Key: N/A    Sig: Inject 10 mg into the skin every 7 days    DX Code: E11.69                                       Case Number/Pending Ref#: N/A

## 2023-03-09 ENCOUNTER — OFFICE VISIT (OUTPATIENT)
Dept: DERMATOLOGY CLINIC | Facility: CLINIC | Age: 68
End: 2023-03-09

## 2023-03-09 DIAGNOSIS — L71.9 ROSACEA: Primary | ICD-10-CM

## 2023-03-09 DIAGNOSIS — L40.8 PSORIASIFORM SEBORRHEIC DERMATITIS: ICD-10-CM

## 2023-03-09 PROCEDURE — 99203 OFFICE O/P NEW LOW 30 MIN: CPT | Performed by: DERMATOLOGY

## 2023-03-09 RX ORDER — KETOCONAZOLE 20 MG/G
1 CREAM TOPICAL 2 TIMES DAILY
Qty: 60 G | Refills: 11 | Status: SHIPPED | OUTPATIENT
Start: 2023-03-09

## 2023-03-09 RX ORDER — CLOBETASOL PROPIONATE 0.05 G/100ML
SHAMPOO TOPICAL
Qty: 120 ML | Refills: 11 | Status: SHIPPED | OUTPATIENT
Start: 2023-03-09

## 2023-03-09 NOTE — PATIENT INSTRUCTIONS
Clobex shampoo for dandruff    Use metronidazole cream to all red areas of face 2x a day    Use hydrocortisone 2 times daily x 2 weeks then every other day  for 3-4 weeks, may apply with the ketoconazole to brows and around nose     Ketoconazole for under breasts 2x a day as needed

## 2023-03-10 NOTE — TELEPHONE ENCOUNTER
Received fax from [employer]. \"We are unable to locate this patient's pharmacy coverage with [employer]. Please contact the insurance plan at the number on the back of the patients card\". Routed back to PA dept for assistance.

## 2023-03-10 NOTE — TELEPHONE ENCOUNTER
Reviewed Pa form and saw I had made error ,refaxed corrected pa form and refaxed to [employer]. Awaiting determination.

## 2023-03-13 ENCOUNTER — PATIENT MESSAGE (OUTPATIENT)
Dept: ENDOCRINOLOGY CLINIC | Facility: CLINIC | Age: 68
End: 2023-03-13

## 2023-03-13 NOTE — TELEPHONE ENCOUNTER
Received response from [employer]. The request for Mounjaro 10mg pen injector is not approved. \"You must have tried and had a poor response to two formulary alternative drugs. .. Some formulary alternatives include Trulicity, Ozempic, and Rybelsus. These alternatives may also need PA\". Case #: [id number]    Please advise.

## 2023-04-05 RX ORDER — PHENTERMINE HYDROCHLORIDE 37.5 MG/1
37.5 TABLET ORAL
Qty: 30 TABLET | Refills: 2 | Status: SHIPPED | OUTPATIENT
Start: 2023-04-05 | End: 2023-05-05

## 2023-04-06 ENCOUNTER — OFFICE VISIT (OUTPATIENT)
Dept: INTERNAL MEDICINE CLINIC | Facility: CLINIC | Age: 68
End: 2023-04-06

## 2023-04-06 VITALS
DIASTOLIC BLOOD PRESSURE: 89 MMHG | OXYGEN SATURATION: 97 % | TEMPERATURE: 98 F | BODY MASS INDEX: 45.56 KG/M2 | SYSTOLIC BLOOD PRESSURE: 162 MMHG | HEART RATE: 84 BPM | HEIGHT: 59 IN | WEIGHT: 226 LBS

## 2023-04-06 DIAGNOSIS — J01.00 ACUTE NON-RECURRENT MAXILLARY SINUSITIS: Primary | ICD-10-CM

## 2023-04-06 PROCEDURE — 3079F DIAST BP 80-89 MM HG: CPT | Performed by: NURSE PRACTITIONER

## 2023-04-06 PROCEDURE — 3008F BODY MASS INDEX DOCD: CPT | Performed by: NURSE PRACTITIONER

## 2023-04-06 PROCEDURE — 3077F SYST BP >= 140 MM HG: CPT | Performed by: NURSE PRACTITIONER

## 2023-04-06 PROCEDURE — 99213 OFFICE O/P EST LOW 20 MIN: CPT | Performed by: NURSE PRACTITIONER

## 2023-04-06 RX ORDER — BENZONATATE 200 MG/1
200 CAPSULE ORAL 3 TIMES DAILY PRN
Qty: 30 CAPSULE | Refills: 0 | Status: SHIPPED | OUTPATIENT
Start: 2023-04-06

## 2023-04-06 RX ORDER — AMOXICILLIN AND CLAVULANATE POTASSIUM 875; 125 MG/1; MG/1
1 TABLET, FILM COATED ORAL 2 TIMES DAILY
Qty: 20 TABLET | Refills: 0 | Status: SHIPPED | OUTPATIENT
Start: 2023-04-06 | End: 2023-04-16

## 2023-04-07 LAB — SARS-COV-2 RNA RESP QL NAA+PROBE: NOT DETECTED

## 2023-04-12 ENCOUNTER — TELEPHONE (OUTPATIENT)
Dept: INTERNAL MEDICINE CLINIC | Facility: CLINIC | Age: 68
End: 2023-04-12

## 2023-04-12 NOTE — TELEPHONE ENCOUNTER
Patient seen 4/6 and given a return to work note. She states her employer is requiring a physical signature on note, no electronic signature. Please print new copy and sign, patient aware Jesus Alejandre is not in the office today. She will  when ready. Routed to provider and on site staff.

## 2023-04-13 NOTE — TELEPHONE ENCOUNTER
Letter was sign and it's ready to be picked up. Spoke with patient, verified name and . Informed patient her letter is ready and will be coming in to pick it up today.

## 2023-04-28 ENCOUNTER — TELEPHONE (OUTPATIENT)
Dept: ENDOCRINOLOGY CLINIC | Facility: CLINIC | Age: 68
End: 2023-04-28

## 2023-05-01 RX ORDER — TIRZEPATIDE 10 MG/.5ML
10 INJECTION, SOLUTION SUBCUTANEOUS
Qty: 6 ML | Refills: 0 | Status: SHIPPED | OUTPATIENT
Start: 2023-05-01

## 2023-05-01 NOTE — TELEPHONE ENCOUNTER
Noted. Mounjaro 10 mg weekly RX sent per written order. [employer] message sent to make an appointment.

## 2023-05-01 NOTE — TELEPHONE ENCOUNTER
Dr. Kiley Aguirre,     Please advise on below refill request.   Would the patient stay on mounjaro 10 mg or are you going to increase to the next dose? Seems like patient is getting medication using the promotional savings card from last year.

## 2023-05-27 ENCOUNTER — LAB ENCOUNTER (OUTPATIENT)
Dept: LAB | Facility: HOSPITAL | Age: 68
End: 2023-05-27
Attending: INTERNAL MEDICINE
Payer: COMMERCIAL

## 2023-05-27 DIAGNOSIS — E11.69 TYPE 2 DIABETES MELLITUS WITH OTHER SPECIFIED COMPLICATION, WITHOUT LONG-TERM CURRENT USE OF INSULIN (HCC): ICD-10-CM

## 2023-05-27 DIAGNOSIS — E03.9 HYPOTHYROIDISM, UNSPECIFIED TYPE: ICD-10-CM

## 2023-05-27 LAB
ALBUMIN SERPL-MCNC: 3.4 G/DL (ref 3.4–5)
ALBUMIN/GLOB SERPL: 0.9 {RATIO} (ref 1–2)
ALP LIVER SERPL-CCNC: 60 U/L
ALT SERPL-CCNC: 20 U/L
ANION GAP SERPL CALC-SCNC: 2 MMOL/L (ref 0–18)
AST SERPL-CCNC: 14 U/L (ref 15–37)
BILIRUB SERPL-MCNC: 1.1 MG/DL (ref 0.1–2)
BUN BLD-MCNC: 21 MG/DL (ref 7–18)
BUN/CREAT SERPL: 26.3 (ref 10–20)
CALCIUM BLD-MCNC: 8.8 MG/DL (ref 8.5–10.1)
CHLORIDE SERPL-SCNC: 106 MMOL/L (ref 98–112)
CO2 SERPL-SCNC: 32 MMOL/L (ref 21–32)
CREAT BLD-MCNC: 0.8 MG/DL
CREAT UR-SCNC: 196 MG/DL
FASTING STATUS PATIENT QL REPORTED: YES
GFR SERPLBLD BASED ON 1.73 SQ M-ARVRAT: 82 ML/MIN/1.73M2 (ref 60–?)
GLOBULIN PLAS-MCNC: 3.7 G/DL (ref 2.8–4.4)
GLUCOSE BLD-MCNC: 119 MG/DL (ref 70–99)
MICROALBUMIN UR-MCNC: 1.43 MG/DL
MICROALBUMIN/CREAT 24H UR-RTO: 7.3 UG/MG (ref ?–30)
OSMOLALITY SERPL CALC.SUM OF ELEC: 294 MOSM/KG (ref 275–295)
POTASSIUM SERPL-SCNC: 3.5 MMOL/L (ref 3.5–5.1)
PROT SERPL-MCNC: 7.1 G/DL (ref 6.4–8.2)
SODIUM SERPL-SCNC: 140 MMOL/L (ref 136–145)
TSI SER-ACNC: 1.28 MIU/ML (ref 0.36–3.74)

## 2023-05-27 PROCEDURE — 3061F NEG MICROALBUMINURIA REV: CPT | Performed by: NURSE PRACTITIONER

## 2023-05-27 PROCEDURE — 84443 ASSAY THYROID STIM HORMONE: CPT

## 2023-05-27 PROCEDURE — 36415 COLL VENOUS BLD VENIPUNCTURE: CPT

## 2023-05-27 PROCEDURE — 80053 COMPREHEN METABOLIC PANEL: CPT

## 2023-05-27 PROCEDURE — 82570 ASSAY OF URINE CREATININE: CPT

## 2023-05-27 PROCEDURE — 82043 UR ALBUMIN QUANTITATIVE: CPT

## 2023-06-05 ENCOUNTER — HOSPITAL ENCOUNTER (OUTPATIENT)
Dept: GENERAL RADIOLOGY | Age: 68
Discharge: HOME | End: 2023-06-05
Attending: NURSE PRACTITIONER
Payer: COMMERCIAL

## 2023-06-05 ENCOUNTER — OFFICE VISIT (OUTPATIENT)
Dept: INTERNAL MEDICINE CLINIC | Facility: CLINIC | Age: 68
End: 2023-06-05

## 2023-06-05 ENCOUNTER — OFFICE VISIT (OUTPATIENT)
Dept: PHYSICAL MEDICINE AND REHAB | Facility: CLINIC | Age: 68
End: 2023-06-05
Payer: COMMERCIAL

## 2023-06-05 VITALS — HEART RATE: 86 BPM | WEIGHT: 218 LBS | BODY MASS INDEX: 43.95 KG/M2 | OXYGEN SATURATION: 95 % | HEIGHT: 59 IN

## 2023-06-05 VITALS
SYSTOLIC BLOOD PRESSURE: 150 MMHG | BODY MASS INDEX: 43.95 KG/M2 | HEIGHT: 59 IN | HEART RATE: 76 BPM | WEIGHT: 218 LBS | DIASTOLIC BLOOD PRESSURE: 84 MMHG

## 2023-06-05 DIAGNOSIS — H60.501 ACUTE OTITIS EXTERNA OF RIGHT EAR, UNSPECIFIED TYPE: ICD-10-CM

## 2023-06-05 DIAGNOSIS — M54.2 NECK PAIN: ICD-10-CM

## 2023-06-05 DIAGNOSIS — E11.9 TYPE 2 DIABETES MELLITUS WITHOUT RETINOPATHY (HCC): ICD-10-CM

## 2023-06-05 DIAGNOSIS — M75.41 SHOULDER IMPINGEMENT SYNDROME, RIGHT: Primary | ICD-10-CM

## 2023-06-05 DIAGNOSIS — M54.2 CERVICALGIA: ICD-10-CM

## 2023-06-05 DIAGNOSIS — I10 ESSENTIAL HYPERTENSION: ICD-10-CM

## 2023-06-05 DIAGNOSIS — M54.2 NECK PAIN: Primary | ICD-10-CM

## 2023-06-05 DIAGNOSIS — Z78.0 POSTMENOPAUSAL: Primary | ICD-10-CM

## 2023-06-05 PROCEDURE — 3077F SYST BP >= 140 MM HG: CPT | Performed by: NURSE PRACTITIONER

## 2023-06-05 PROCEDURE — 72040 X-RAY EXAM NECK SPINE 2-3 VW: CPT | Performed by: NURSE PRACTITIONER

## 2023-06-05 PROCEDURE — 20610 DRAIN/INJ JOINT/BURSA W/O US: CPT | Performed by: PHYSICAL MEDICINE & REHABILITATION

## 2023-06-05 PROCEDURE — 3008F BODY MASS INDEX DOCD: CPT | Performed by: NURSE PRACTITIONER

## 2023-06-05 PROCEDURE — 3079F DIAST BP 80-89 MM HG: CPT | Performed by: NURSE PRACTITIONER

## 2023-06-05 PROCEDURE — 3008F BODY MASS INDEX DOCD: CPT | Performed by: PHYSICAL MEDICINE & REHABILITATION

## 2023-06-05 PROCEDURE — 99214 OFFICE O/P EST MOD 30 MIN: CPT | Performed by: NURSE PRACTITIONER

## 2023-06-05 PROCEDURE — 99204 OFFICE O/P NEW MOD 45 MIN: CPT | Performed by: PHYSICAL MEDICINE & REHABILITATION

## 2023-06-05 RX ORDER — CYCLOBENZAPRINE HCL 10 MG
10 TABLET ORAL NIGHTLY
Qty: 30 TABLET | Refills: 0 | Status: SHIPPED | OUTPATIENT
Start: 2023-06-05 | End: 2023-07-05

## 2023-06-05 RX ORDER — TRIAMCINOLONE ACETONIDE 40 MG/ML
80 INJECTION, SUSPENSION INTRA-ARTICULAR; INTRAMUSCULAR ONCE
Status: COMPLETED | OUTPATIENT
Start: 2023-06-05 | End: 2023-06-05

## 2023-06-05 RX ORDER — LIDOCAINE HYDROCHLORIDE 10 MG/ML
4 INJECTION, SOLUTION INFILTRATION; PERINEURAL ONCE
Status: COMPLETED | OUTPATIENT
Start: 2023-06-05 | End: 2023-06-05

## 2023-06-05 RX ADMIN — LIDOCAINE HYDROCHLORIDE 4 ML: 10 INJECTION, SOLUTION INFILTRATION; PERINEURAL at 15:23:00

## 2023-06-05 RX ADMIN — TRIAMCINOLONE ACETONIDE 80 MG: 40 INJECTION, SUSPENSION INTRA-ARTICULAR; INTRAMUSCULAR at 15:23:00

## 2023-06-06 ENCOUNTER — TELEPHONE (OUTPATIENT)
Dept: PHYSICAL MEDICINE AND REHAB | Facility: CLINIC | Age: 68
End: 2023-06-06

## 2023-06-06 PROBLEM — M54.2 CERVICALGIA: Status: ACTIVE | Noted: 2023-06-06

## 2023-06-06 PROBLEM — M75.41 SHOULDER IMPINGEMENT SYNDROME, RIGHT: Status: ACTIVE | Noted: 2023-06-06

## 2023-06-06 NOTE — TELEPHONE ENCOUNTER
Milagro Quinteros from Referral Dept phoned with a question Re: Pt .   Please phone her back ASAP at [phone]

## 2023-06-07 ENCOUNTER — TELEPHONE (OUTPATIENT)
Dept: PHYSICAL THERAPY | Facility: HOSPITAL | Age: 68
End: 2023-06-07

## 2023-06-07 ENCOUNTER — ORDER TRANSCRIPTION (OUTPATIENT)
Dept: PHYSICAL THERAPY | Facility: HOSPITAL | Age: 68
End: 2023-06-07

## 2023-06-07 ENCOUNTER — OFFICE VISIT (OUTPATIENT)
Dept: PHYSICAL THERAPY | Age: 68
End: 2023-06-07
Attending: PHYSICAL MEDICINE & REHABILITATION
Payer: COMMERCIAL

## 2023-06-07 DIAGNOSIS — M54.2 CERVICALGIA: ICD-10-CM

## 2023-06-07 DIAGNOSIS — M75.41 SHOULDER IMPINGEMENT SYNDROME, RIGHT: ICD-10-CM

## 2023-06-07 DIAGNOSIS — M75.41 SHOULDER IMPINGEMENT SYNDROME, RIGHT: Primary | ICD-10-CM

## 2023-06-07 PROCEDURE — 97110 THERAPEUTIC EXERCISES: CPT | Performed by: PHYSICAL THERAPIST

## 2023-06-07 PROCEDURE — 97161 PT EVAL LOW COMPLEX 20 MIN: CPT | Performed by: PHYSICAL THERAPIST

## 2023-06-07 NOTE — TELEPHONE ENCOUNTER
Jennifer Burnette called to confirm that the shoulder injection will be performed in office. This RN checked order, and nothing indicated that it was going to be under fluoroscopy, so advised it should be in  Office. Jennifer Burnette from referrals stated that since it is in-office and Dr. Marisa Elise is an in-network provider, she authorized it. This RN will route to PSR to schedule in - office injection.

## 2023-06-07 NOTE — PROCEDURES
Shoulder injection  Location: right  After discussing benefits and possible side effects, we proceeded with a subacromial bursa injection. The patient was consented. The patient was seated, and the posterolateral shoulder was palpated. The skin was sterilely prepped. A 25-gauge needle was introduced into the subacromial space. A total of 2 cc of 40 mg/ml Kenalog and 4 cc of 1% lidocaine was injected. The patient tolerated the procedure well without adverse effects.

## 2023-06-12 ENCOUNTER — OFFICE VISIT (OUTPATIENT)
Dept: PHYSICAL THERAPY | Age: 68
End: 2023-06-12
Attending: PHYSICAL MEDICINE & REHABILITATION
Payer: COMMERCIAL

## 2023-06-12 PROCEDURE — 97140 MANUAL THERAPY 1/> REGIONS: CPT | Performed by: PHYSICAL THERAPIST

## 2023-06-12 PROCEDURE — 97110 THERAPEUTIC EXERCISES: CPT | Performed by: PHYSICAL THERAPIST

## 2023-06-12 NOTE — PROGRESS NOTES
Dx: Shoulder impingement syndrome, right (M75.41)            Insurance (Authorized # of Visits):  10-12           Authorizing Physician: Dr. Galindo Montana MD visit: none scheduled  Fall Risk: standard         Precautions: n/a           Medication changes per patient? NO  Date of evaluation/PN: 2023  Pain ratin/10  Subjective: Reports her shoulder is feeling a little bit better and will getting her own pulley for home soon. Objective: Improved (L) shoulder AROM for reaching along with decreased ERP after repeated hyperextension stretching. Assessment: Responding well to stretching and ROM ex's with increased AROM without latent increase in tissue irritability after sessions. Goals: In progress as follows:  Nita Pichardo will have decreased rest pain to <3/10 to allow uninterrupted sleep hygiene and reduced tissue irritability. Nita Pichardo will have return to WNL and painfree (R) shoulder AROM to allow OH reaching without symptom provocation. Nita Pichardo will have increased (R) shoulder strength to 4+/5 to allow use of (R)UE for lifting and carrying without symptom aggravation. Nita Pichardo will be (I) with a home program to allow discharge from PT towards further self-recovery and maintenance of function. Plan: Continue initial HEP and monitor for latent tissue irritation with ex's this session. Date: 2023  TX#: 2/10-12 Date:                 TX#: 3/ Date:                 TX#: 4/ Date:                 TX#: 5/ Date:    Tx#: 6/   THERAPEUTIC EX 38 mins       OH pulley 4' ea flexion/scaption       Scapular retraction with shoulder ER Yellow tband with back against foam roll 2x10       Wall angels  2x10       Prone scapular retraction 3x10 with manual cueing       Prone saws 2x10       Prone MT/rhomboid 2x10 ea       Static hold Supine 2 x 30 secs at 90 flexion                               MANUAL TX 8 mins       Thoracic PA's Gr 2-3 x 2 mins       (R) scapulothoracic and GH mobs Gr 2-3 prone x 6 mins       HEP: continue initial HEP    Charges:  Therapeutic ex x 3; Manual Therapy x 1       Total Timed Treatment: 46 min  Total Treatment Time: 46 min

## 2023-06-13 ENCOUNTER — TELEPHONE (OUTPATIENT)
Dept: ENDOCRINOLOGY CLINIC | Facility: CLINIC | Age: 68
End: 2023-06-13

## 2023-06-13 NOTE — TELEPHONE ENCOUNTER
Prior Authorization started for: Tirzepatide [facility]) 10 MG/0.5ML Subcutaneous Solution Pen-injector, Inject 10 mg into the skin every 7 days. , Disp: 6 mL, Rfl: 0     KEY: [id number]

## 2023-06-14 ENCOUNTER — OFFICE VISIT (OUTPATIENT)
Dept: PHYSICAL THERAPY | Age: 68
End: 2023-06-14
Attending: PHYSICAL MEDICINE & REHABILITATION
Payer: COMMERCIAL

## 2023-06-14 PROCEDURE — 97140 MANUAL THERAPY 1/> REGIONS: CPT | Performed by: PHYSICAL THERAPIST

## 2023-06-14 PROCEDURE — 97110 THERAPEUTIC EXERCISES: CPT | Performed by: PHYSICAL THERAPIST

## 2023-06-14 PROCEDURE — 97112 NEUROMUSCULAR REEDUCATION: CPT | Performed by: PHYSICAL THERAPIST

## 2023-06-14 NOTE — TELEPHONE ENCOUNTER
Medication PA Requested: Tirzepatide (MOUNJARO) 10 MG/0.5ML Subcutaneous Solution Pen-injector                                                         CoverMyMeds Used:  Key: [id number]  Quantity: 6ml  Day Supply: 90 days  Sig:Inject 10 mg into the skin every 7 days. ,  DX Code: E11.69                                       CPT code (if applicable):   Case Number/Pending Ref#:

## 2023-06-14 NOTE — PROGRESS NOTES
Dx: Shoulder impingement syndrome, right (M75.41)            Insurance (Authorized # of Visits):  10-12           Authorizing Physician: Dr. Sterling Montana MD visit: none scheduled  Fall Risk: standard         Precautions: n/a           Medication changes per patient? NO  Date of evaluation/PN: 2023  Pain ratin/10  Subjective: Reports getting a lot of relief from shoulder hyperextension self-stretching. Objective: (R) shoulder PROM WNL after stretching/mobs. Assessment: Needs work on proximal stability to re-establish control of regained AROM. Goals: In progress as follows:  Glenny Garcia will have decreased rest pain to <3/10 to allow uninterrupted sleep hygiene and reduced tissue irritability. Glenny Garcia will have return to WNL and painfree (R) shoulder AROM to allow OH reaching without symptom provocation. Glenny Garcia will have increased (R) shoulder strength to 4+/5 to allow use of (R)UE for lifting and carrying without symptom aggravation. Glenny Garcia will be (I) with a home program to allow discharge from PT towards further self-recovery and maintenance of function. Plan: Continue to progress proximal stabilization/strengthening. Date: 2023  TX#: 2/10-12 Date:2023                 TX#: 3/10-12 Date:                 TX#: 4/ Date:                 TX#: 5/ Date:    Tx#: 6/   THERAPEUTIC EX 38 mins 30 mins      OH pulley 4' ea flexion/scaption 4' ea flexion/scaption/ext-IR      Scapular retraction with shoulder ER Yellow tband with back against foam roll 2x10       Wall angels  2x10       Prone scapular retraction 3x10 with manual cueing       Prone saws 2x10 2x10      Prone MT/rhomboid 2x10 ea 2x10 ea      Static hold Supine 2 x 30 secs at 90 flexion       Prone LT  2x10      Supine serratus punches  2x10      Supine flexion  Dowel 3x10                      NEUROMUSCULAR RE-EDUCATION  8 mins      Shoulder alphabet  Standing 60 flexion upper and lower case       Rhythmic stabilization  Supine 90 and 120 flexion 2 x 1 min ea              MANUAL TX 8 mins 8 mins      Thoracic PA's Gr 2-3 x 2 mins       STM  UT/periscapular mm      (R) scapulothoracic and GH mobs Gr 2-3 prone x 6 mins Gr 2-3 prone/supine      HEP: Continue hyperext self-stretching. Charges:  Therapeutic ex x 2; neuromuscular re-education x 1; Manual Therapy x 1       Total Timed Treatment: 46 min  Total Treatment Time: 46 min

## 2023-06-15 NOTE — TELEPHONE ENCOUNTER
Medication PA Requested: Tirzepatide [facility]) 10 MG/0.5ML Subcutaneous Solution Pen-injector                                                         CoverMyMeds Used:  Key: [id number]  Quantity: 6ml  Day Supply: 90 days  Sig:Inject 10 mg into the skin every 7 days   diag code E11.69 Type 2 diabetes mellitus with other specified complication, without long-term current use of insulin      epa submitted with LOV & a1c 02/27/2023  Awaiting detemrination

## 2023-06-19 ENCOUNTER — OFFICE VISIT (OUTPATIENT)
Dept: SURGERY | Facility: CLINIC | Age: 68
End: 2023-06-19
Payer: COMMERCIAL

## 2023-06-19 VITALS
WEIGHT: 218.56 LBS | BODY MASS INDEX: 44.06 KG/M2 | OXYGEN SATURATION: 96 % | HEIGHT: 59 IN | SYSTOLIC BLOOD PRESSURE: 159 MMHG | HEART RATE: 81 BPM | DIASTOLIC BLOOD PRESSURE: 85 MMHG

## 2023-06-19 DIAGNOSIS — E11.9 TYPE 2 DIABETES MELLITUS WITHOUT RETINOPATHY (HCC): Primary | ICD-10-CM

## 2023-06-19 DIAGNOSIS — I10 ESSENTIAL HYPERTENSION: ICD-10-CM

## 2023-06-19 DIAGNOSIS — E66.01 MORBID OBESITY WITH BMI OF 40.0-44.9, ADULT (HCC): ICD-10-CM

## 2023-06-19 DIAGNOSIS — F43.9 STRESS: ICD-10-CM

## 2023-06-19 DIAGNOSIS — Z51.81 ENCOUNTER FOR THERAPEUTIC DRUG MONITORING: ICD-10-CM

## 2023-06-19 PROCEDURE — 3008F BODY MASS INDEX DOCD: CPT | Performed by: INTERNAL MEDICINE

## 2023-06-19 PROCEDURE — 3079F DIAST BP 80-89 MM HG: CPT | Performed by: INTERNAL MEDICINE

## 2023-06-19 PROCEDURE — 99214 OFFICE O/P EST MOD 30 MIN: CPT | Performed by: INTERNAL MEDICINE

## 2023-06-19 PROCEDURE — 3077F SYST BP >= 140 MM HG: CPT | Performed by: INTERNAL MEDICINE

## 2023-06-19 RX ORDER — PHENTERMINE HYDROCHLORIDE 37.5 MG/1
37.5 TABLET ORAL
Qty: 90 TABLET | Refills: 1 | Status: SHIPPED | OUTPATIENT
Start: 2023-06-19 | End: 2023-09-17

## 2023-06-19 RX ORDER — PHENTERMINE HYDROCHLORIDE 37.5 MG/1
TABLET ORAL
COMMUNITY
Start: 2023-06-15 | End: 2023-06-19

## 2023-06-20 ENCOUNTER — OFFICE VISIT (OUTPATIENT)
Dept: INTERNAL MEDICINE CLINIC | Facility: CLINIC | Age: 68
End: 2023-06-20

## 2023-06-20 ENCOUNTER — PATIENT MESSAGE (OUTPATIENT)
Dept: INTERNAL MEDICINE CLINIC | Facility: CLINIC | Age: 68
End: 2023-06-20

## 2023-06-20 VITALS
WEIGHT: 217 LBS | SYSTOLIC BLOOD PRESSURE: 132 MMHG | HEIGHT: 59 IN | BODY MASS INDEX: 43.75 KG/M2 | HEART RATE: 85 BPM | OXYGEN SATURATION: 97 % | DIASTOLIC BLOOD PRESSURE: 80 MMHG

## 2023-06-20 DIAGNOSIS — J44.1 COPD EXACERBATION (HCC): Primary | ICD-10-CM

## 2023-06-20 PROCEDURE — 3075F SYST BP GE 130 - 139MM HG: CPT | Performed by: NURSE PRACTITIONER

## 2023-06-20 PROCEDURE — 99214 OFFICE O/P EST MOD 30 MIN: CPT | Performed by: NURSE PRACTITIONER

## 2023-06-20 PROCEDURE — 3008F BODY MASS INDEX DOCD: CPT | Performed by: NURSE PRACTITIONER

## 2023-06-20 PROCEDURE — 3079F DIAST BP 80-89 MM HG: CPT | Performed by: NURSE PRACTITIONER

## 2023-06-20 RX ORDER — AZITHROMYCIN 250 MG/1
TABLET, FILM COATED ORAL
Qty: 6 TABLET | Refills: 0 | Status: SHIPPED | OUTPATIENT
Start: 2023-06-20 | End: 2023-06-25

## 2023-06-20 RX ORDER — PREDNISONE 20 MG/1
20 TABLET ORAL 2 TIMES DAILY
Qty: 10 TABLET | Refills: 0 | Status: SHIPPED | OUTPATIENT
Start: 2023-06-20 | End: 2023-06-25

## 2023-06-20 RX ORDER — LORATADINE 10 MG/1
10 TABLET, ORALLY DISINTEGRATING ORAL DAILY
Qty: 90 TABLET | Refills: 0 | Status: SHIPPED | OUTPATIENT
Start: 2023-06-20 | End: 2023-06-23

## 2023-06-20 NOTE — TELEPHONE ENCOUNTER
Epa for mounjaro approved from 06/15/2023-06/15/2024, Case ID: [id number]    [facility], [phone], after on hold several minutes call dropped, called again and spoke with [facility].  He states went through for $25.00    My chart message sent to patient of approval.

## 2023-06-21 ENCOUNTER — APPOINTMENT (OUTPATIENT)
Dept: PHYSICAL THERAPY | Age: 68
End: 2023-06-21
Attending: PHYSICAL MEDICINE & REHABILITATION
Payer: COMMERCIAL

## 2023-06-21 LAB — SARS-COV-2 RNA RESP QL NAA+PROBE: NOT DETECTED

## 2023-06-22 ENCOUNTER — TELEPHONE (OUTPATIENT)
Dept: INTERNAL MEDICINE CLINIC | Facility: CLINIC | Age: 68
End: 2023-06-22

## 2023-06-22 NOTE — TELEPHONE ENCOUNTER
Patient requesting that the note for her excusal for work (see patient message from 6/20/2023) be printed for her to  in office, please call when ready.

## 2023-06-22 NOTE — TELEPHONE ENCOUNTER
Spoke to patient and confirmed she would like to  letter at [facility] location.  Routed to staff to assist.

## 2023-06-22 NOTE — TELEPHONE ENCOUNTER
Spoke with patient, verified name and . Patient stated that her letter needs to be sign by Guillermo Garay. Informed patient that her letter is sign and it's ready to be picked up. Letter was placed in the .

## 2023-06-23 ENCOUNTER — TELEPHONE (OUTPATIENT)
Dept: INTERNAL MEDICINE CLINIC | Facility: CLINIC | Age: 68
End: 2023-06-23

## 2023-06-23 ENCOUNTER — PATIENT MESSAGE (OUTPATIENT)
Dept: INTERNAL MEDICINE CLINIC | Facility: CLINIC | Age: 68
End: 2023-06-23

## 2023-06-23 ENCOUNTER — HOSPITAL ENCOUNTER (OUTPATIENT)
Dept: GENERAL RADIOLOGY | Age: 68
Discharge: HOME | End: 2023-06-23
Attending: INTERNAL MEDICINE
Payer: COMMERCIAL

## 2023-06-23 ENCOUNTER — OFFICE VISIT (OUTPATIENT)
Dept: INTERNAL MEDICINE CLINIC | Facility: CLINIC | Age: 68
End: 2023-06-23

## 2023-06-23 VITALS
HEART RATE: 87 BPM | WEIGHT: 216 LBS | TEMPERATURE: 99 F | DIASTOLIC BLOOD PRESSURE: 77 MMHG | HEIGHT: 59 IN | SYSTOLIC BLOOD PRESSURE: 146 MMHG | OXYGEN SATURATION: 94 % | BODY MASS INDEX: 43.55 KG/M2

## 2023-06-23 DIAGNOSIS — R05.3 CHRONIC COUGH: ICD-10-CM

## 2023-06-23 DIAGNOSIS — R05.3 CHRONIC COUGH: Primary | ICD-10-CM

## 2023-06-23 PROCEDURE — 3008F BODY MASS INDEX DOCD: CPT | Performed by: INTERNAL MEDICINE

## 2023-06-23 PROCEDURE — 99213 OFFICE O/P EST LOW 20 MIN: CPT | Performed by: INTERNAL MEDICINE

## 2023-06-23 PROCEDURE — 71046 X-RAY EXAM CHEST 2 VIEWS: CPT | Performed by: INTERNAL MEDICINE

## 2023-06-23 PROCEDURE — 3078F DIAST BP <80 MM HG: CPT | Performed by: INTERNAL MEDICINE

## 2023-06-23 PROCEDURE — 3077F SYST BP >= 140 MM HG: CPT | Performed by: INTERNAL MEDICINE

## 2023-06-23 RX ORDER — ALBUTEROL SULFATE 90 UG/1
1 AEROSOL, METERED RESPIRATORY (INHALATION) EVERY 6 HOURS PRN
Qty: 1 EACH | Refills: 0 | Status: SHIPPED | OUTPATIENT
Start: 2023-06-23

## 2023-06-23 RX ORDER — BENZONATATE 100 MG/1
100 CAPSULE ORAL 3 TIMES DAILY PRN
Qty: 20 CAPSULE | Refills: 0 | Status: SHIPPED | OUTPATIENT
Start: 2023-06-23 | End: 2023-06-30

## 2023-06-28 ENCOUNTER — APPOINTMENT (OUTPATIENT)
Dept: PHYSICAL THERAPY | Age: 68
End: 2023-06-28
Attending: PHYSICAL MEDICINE & REHABILITATION
Payer: COMMERCIAL

## 2023-06-28 ENCOUNTER — TELEPHONE (OUTPATIENT)
Dept: PHYSICAL THERAPY | Facility: HOSPITAL | Age: 68
End: 2023-06-28

## 2023-07-06 ENCOUNTER — APPOINTMENT (OUTPATIENT)
Dept: PHYSICAL THERAPY | Age: 68
End: 2023-07-06
Attending: PHYSICAL MEDICINE & REHABILITATION
Payer: COMMERCIAL

## 2023-07-06 ENCOUNTER — TELEPHONE (OUTPATIENT)
Dept: PHYSICAL THERAPY | Age: 68
End: 2023-07-06

## 2023-07-10 ENCOUNTER — TELEPHONE (OUTPATIENT)
Dept: INTERNAL MEDICINE CLINIC | Facility: CLINIC | Age: 68
End: 2023-07-10

## 2023-07-10 DIAGNOSIS — N93.9 ABNORMAL VAGINAL BLEEDING: Primary | ICD-10-CM

## 2023-07-10 NOTE — TELEPHONE ENCOUNTER
Patient stated that she had a uterine biopsy with Dr Sera Giraldo tomorrow for abnormal vaginal bleeding. Needs a referral ASAP. Referral pended.

## 2023-07-11 ENCOUNTER — APPOINTMENT (OUTPATIENT)
Dept: PHYSICAL THERAPY | Age: 68
End: 2023-07-11
Attending: PHYSICAL MEDICINE & REHABILITATION
Payer: COMMERCIAL

## 2023-07-12 ENCOUNTER — APPOINTMENT (OUTPATIENT)
Dept: PHYSICAL THERAPY | Age: 68
End: 2023-07-12
Attending: PHYSICAL MEDICINE & REHABILITATION
Payer: COMMERCIAL

## 2023-07-13 ENCOUNTER — OFFICE VISIT (OUTPATIENT)
Dept: PHYSICAL THERAPY | Age: 68
End: 2023-07-13
Attending: PHYSICAL MEDICINE & REHABILITATION
Payer: COMMERCIAL

## 2023-07-13 PROCEDURE — 97110 THERAPEUTIC EXERCISES: CPT | Performed by: PHYSICAL THERAPIST

## 2023-07-13 PROCEDURE — 97112 NEUROMUSCULAR REEDUCATION: CPT | Performed by: PHYSICAL THERAPIST

## 2023-07-13 NOTE — PROGRESS NOTES
Progress Summary  Pt has attended 4 visits in Physical Therapy. Dx: Shoulder impingement syndrome, right (M75.41)            Insurance (Authorized # of Visits):  10-12           Authorizing Physician: Dr. Carmina Montana MD visit: none scheduled  Fall Risk: standard         Precautions: n/a           Medication changes per patient? NO  Date of evaluation/PN: 2023  Pain ratin/10  Assessment:   Shanta Alvarez returns today for continued PT after a prolonged absence due to illness. She presents with improved (R) shoulder AROM but continues to have end-range pain especially into elevation. Persistent pain continues to limit functional use of her (R)UE for horizontal and overhead reaching along with lifting and carrying. Subjective: Patient c/o painfree crepitus which resolves with AROM ex's. Objective: (R) shoulder PROM WNL and AAROM improved to WNL as well after manual techniques and exercise; (R) proximal stabilizer/scapular strength grossly 3+/5      Goals: In progress as follows:  Shanta Alvarez will have decreased rest pain to <3/10 to allow uninterrupted sleep hygiene and reduced tissue irritability. Shanta Alvarez will have return to WNL and painfree (R) shoulder AROM to allow OH reaching without symptom provocation. Shanta Alvarez will have increased (R) shoulder strength to 4+/5 to allow use of (R)UE for lifting and carrying without symptom aggravation. Shanta Alvarez will be (I) with a home program to allow discharge from PT towards further self-recovery and maintenance of function. Plan: Continue to progress proximal stabilization/strengthening. Date: 2023  TX#: 2/10-12 Date:2023                 TX#: 3/10-12 Date:2023                TX#: 4/10-12 Date:                 TX#: 5/ Date:    Tx#: 6/   THERAPEUTIC EX 38 mins 30 mins 38 mins     OH pulley 4' ea flexion/scaption 4' ea flexion/scaption/ext-IR 4' ea flexion/scaption/ext-IR     Scapular retraction with shoulder ER Yellow tband with back against foam roll 2x10       Wall angels  2x10       Prone scapular retraction 3x10 with manual cueing       Prone saws 2x10 2x10 1lb 3x10     Prone MT/rhomboid 2x10 ea 2x10 ea 1lb 3x10 ea     Static hold Supine 2 x 30 secs at 90 flexion       Prone LT  2x10 1lb 3x10     Supine serratus punches  2x10 1lb 3x10     Supine flexion  Dowel 3x10      (R) shoulder AAROM   10 mins     Scifit UE only    4' ea fwd/retro     Shoulder ER   Yellow tband 3x10     SL shoulder abd   1lb 3x10     SL shoulder ER   1lb 3x10                     NEUROMUSCULAR RE-EDUCATION  8 mins 8 mins     Shoulder alphabet  Standing 60 flexion upper and lower case  Standing 90 flexion upper/lower case     Rhythmic stabilization  Supine 90 and 120 flexion 2 x 1 min ea Supine 90 and 120 flexion 2 x 1 min ea             MANUAL TX 8 mins 8 mins      Thoracic PA's Gr 2-3 x 2 mins       STM  UT/periscapular mm      (R) scapulothoracic and GH mobs Gr 2-3 prone x 6 mins Gr 2-3 prone/supine      HEP: Continue current HEP and monitor for latent increase in tissue irritability. Charges: Therapeutic ex x 3; neuromuscular re-education x 1       Total Timed Treatment: 46 min  Total Treatment Time: 46 min    Plan: Continue skilled Physical Therapy 1-2 x/week or a total of 12 visits as established in her initial POC. Sameer Souza was advised of these findings, precautions, and treatment options and has agreed to actively participate in planning and for this course of care. Thank you for your referral. If you have any questions, please contact me at Dept: [phone].     Sincerely,  Electronically signed by therapist: Dariana Seth PT

## 2023-07-17 ENCOUNTER — APPOINTMENT (OUTPATIENT)
Dept: PHYSICAL THERAPY | Age: 68
End: 2023-07-17
Attending: PHYSICAL MEDICINE & REHABILITATION
Payer: COMMERCIAL

## 2023-07-17 ENCOUNTER — TELEPHONE (OUTPATIENT)
Dept: PHYSICAL THERAPY | Facility: HOSPITAL | Age: 68
End: 2023-07-17

## 2023-07-19 ENCOUNTER — OFFICE VISIT (OUTPATIENT)
Dept: PHYSICAL THERAPY | Age: 68
End: 2023-07-19
Attending: PHYSICAL MEDICINE & REHABILITATION
Payer: COMMERCIAL

## 2023-07-19 ENCOUNTER — HOSPITAL ENCOUNTER (OUTPATIENT)
Dept: BONE DENSITY | Age: 68
Discharge: HOME | End: 2023-07-19
Attending: NURSE PRACTITIONER
Payer: COMMERCIAL

## 2023-07-19 ENCOUNTER — TELEPHONE (OUTPATIENT)
Dept: INTERNAL MEDICINE CLINIC | Facility: CLINIC | Age: 68
End: 2023-07-19

## 2023-07-19 DIAGNOSIS — Z78.0 POSTMENOPAUSAL: ICD-10-CM

## 2023-07-19 DIAGNOSIS — Z00.00 ANNUAL PHYSICAL EXAM: Primary | ICD-10-CM

## 2023-07-19 DIAGNOSIS — E55.9 VITAMIN D DEFICIENCY: ICD-10-CM

## 2023-07-19 PROCEDURE — 97112 NEUROMUSCULAR REEDUCATION: CPT | Performed by: PHYSICAL THERAPIST

## 2023-07-19 PROCEDURE — 97110 THERAPEUTIC EXERCISES: CPT | Performed by: PHYSICAL THERAPIST

## 2023-07-19 PROCEDURE — 77080 DXA BONE DENSITY AXIAL: CPT | Performed by: NURSE PRACTITIONER

## 2023-07-19 NOTE — PROGRESS NOTES
Dx: Shoulder impingement syndrome, right (M75.41)            Insurance (Authorized # of Visits):  10-12           Authorizing Physician: Dr. Regina Pack  Next MD visit: none scheduled  Fall Risk: standard         Precautions: n/a           Medication changes per patient? NO  Date of evaluation/PN: 2023  Pain ratin  Subjective: Arrived painfree and reports decreased crepitus in her (R) shoulder. Objective: WNL and painfree (R) shoulder AROM today and only demonstrates fatigue with sustained horizontal reaching and no symptom provocation with progression to gravity resisted elevation. Assessment:   Improving proximal strength and stability allowing return to painfree movement but needs continue work on strengthening to allow return to normal and painfree load handling using (R)UE and for bimanual activities/ADL's    Goals: In progress as follows:  Itz Sarmiento will have decreased rest pain to <3/10 to allow uninterrupted sleep hygiene and reduced tissue irritability. Itz Sarmiento will have return to WNL and painfree (R) shoulder AROM to allow OH reaching without symptom provocation. Itz Sarmiento will have increased (R) shoulder strength to 4+/5 to allow use of (R)UE for lifting and carrying without symptom aggravation. Itz Sarmiento will be (I) with a home program to allow discharge from PT towards further self-recovery and maintenance of function. Plan: Continue to progress proximal stabilization/strengthening and RC strengthening. Date: 2023  TX#: 2/10-12 Date:2023                 TX#: 3/10-12 Date:2023                TX#: 4/10-12 Date: 2023              TX#: 5/10-12 Date:    Tx#:    THERAPEUTIC EX 38 mins 30 mins 38 mins 38 mins    OH pulley 4' ea flexion/scaption 4' ea flexion/scaption/ext-IR 4' ea flexion/scaption/ext-IR 4' ea flexion/scaption/ext-IR    Scapular retraction with shoulder ER Yellow tband with back against foam roll 2x10       Wall angels  2x10       Prone scapular retraction 3x10 with manual cueing       Prone saws 2x10 2x10 1lb 3x10     Prone MT/rhomboid 2x10 ea 2x10 ea 1lb 3x10 ea     Static hold Supine 2 x 30 secs at 90 flexion       Prone LT  2x10 1lb 3x10     Supine serratus punches  2x10 1lb 3x10     Supine flexion  Dowel 3x10      (R) shoulder AAROM   10 mins     Scifit UE only    4' ea fwd/retro 4' ea fwd/retro    Shoulder ER   Yellow tband 3x10     SL shoulder abd   1lb 3x10     SL shoulder ER   1lb 3x10     Standing shoulder flexion/scaption    1lb 3x10 ea    SB push up plus    RSB 3x10    SB wall walk with swims    RSB 3x10    Curl to press    1lb 3x10    Lat pull down    Yellow tband 3x10            NEUROMUSCULAR RE-EDUCATION  8 mins 8 mins 8 mins    Shoulder alphabet  Standing 60 flexion upper and lower case  Standing 90 flexion upper/lower case Standing 90 flexion and scaption upper/lower case    Rhythmic stabilization  Supine 90 and 120 flexion 2 x 1 min ea Supine 90 and 120 flexion 2 x 1 min ea Supine 90 and 120 flexion 2 x 1 min ea            MANUAL TX 8 mins 8 mins      Thoracic PA's Gr 2-3 x 2 mins       STM  UT/periscapular mm      (R) scapulothoracic and GH mobs Gr 2-3 prone x 6 mins Gr 2-3 prone/supine      HEP: Continue current HEP and monitor for latent increase in tissue irritability. Charges: Therapeutic ex x 3; neuromuscular re-education x 1       Total Timed Treatment: 46 min  Total Treatment Time: 46 min    Plan: Continue to progress strengthening and functional load handling.

## 2023-07-19 NOTE — TELEPHONE ENCOUNTER
Patient feeling tired for the past 6 weeks since her new Thyroid Medication changed. Requesting Labs to be ordered before her Physical appointment 7/31/23.

## 2023-07-19 NOTE — TELEPHONE ENCOUNTER
Spoke with pt, verified , informed pt that labs have been ordered, pt verbalized understanding and requested Dr Erick Starr to order vitamin d lab as well. Please advise.

## 2023-07-23 ENCOUNTER — LAB ENCOUNTER (OUTPATIENT)
Dept: LAB | Facility: HOSPITAL | Age: 68
End: 2023-07-23
Attending: INTERNAL MEDICINE
Payer: COMMERCIAL

## 2023-07-23 DIAGNOSIS — E55.9 VITAMIN D DEFICIENCY: ICD-10-CM

## 2023-07-23 DIAGNOSIS — Z00.00 ANNUAL PHYSICAL EXAM: ICD-10-CM

## 2023-07-23 LAB
ALBUMIN SERPL-MCNC: 3 G/DL (ref 3.4–5)
ALBUMIN/GLOB SERPL: 0.8 {RATIO} (ref 1–2)
ALP LIVER SERPL-CCNC: 81 U/L
ALT SERPL-CCNC: 21 U/L
ANION GAP SERPL CALC-SCNC: 7 MMOL/L (ref 0–18)
AST SERPL-CCNC: 14 U/L (ref 15–37)
BASOPHILS # BLD AUTO: 0.06 X10(3) UL (ref 0–0.2)
BASOPHILS NFR BLD AUTO: 0.5 %
BILIRUB SERPL-MCNC: 0.6 MG/DL (ref 0.1–2)
BUN BLD-MCNC: 29 MG/DL (ref 7–18)
BUN/CREAT SERPL: 36.7 (ref 10–20)
CALCIUM BLD-MCNC: 8.8 MG/DL (ref 8.5–10.1)
CHLORIDE SERPL-SCNC: 107 MMOL/L (ref 98–112)
CHOLEST SERPL-MCNC: 211 MG/DL (ref ?–200)
CO2 SERPL-SCNC: 29 MMOL/L (ref 21–32)
CREAT BLD-MCNC: 0.79 MG/DL
CREAT UR-SCNC: 177 MG/DL
DEPRECATED RDW RBC AUTO: 48.4 FL (ref 35.1–46.3)
EGFRCR SERPLBLD CKD-EPI 2021: 83 ML/MIN/1.73M2 (ref 60–?)
EOSINOPHIL # BLD AUTO: 0.06 X10(3) UL (ref 0–0.7)
EOSINOPHIL NFR BLD AUTO: 0.5 %
ERYTHROCYTE [DISTWIDTH] IN BLOOD BY AUTOMATED COUNT: 14.6 % (ref 11–15)
EST. AVERAGE GLUCOSE BLD GHB EST-MCNC: 140 MG/DL (ref 68–126)
FASTING PATIENT LIPID ANSWER: YES
FASTING STATUS PATIENT QL REPORTED: YES
GLOBULIN PLAS-MCNC: 3.7 G/DL (ref 2.8–4.4)
GLUCOSE BLD-MCNC: 100 MG/DL (ref 70–99)
HBA1C MFR BLD: 6.5 % (ref ?–5.7)
HCT VFR BLD AUTO: 41.9 %
HDLC SERPL-MCNC: 53 MG/DL (ref 40–59)
HGB BLD-MCNC: 13.8 G/DL
IMM GRANULOCYTES # BLD AUTO: 0.1 X10(3) UL (ref 0–1)
IMM GRANULOCYTES NFR BLD: 0.8 %
LDLC SERPL CALC-MCNC: 134 MG/DL (ref ?–100)
LYMPHOCYTES # BLD AUTO: 1.82 X10(3) UL (ref 1–4)
LYMPHOCYTES NFR BLD AUTO: 15.2 %
MCH RBC QN AUTO: 29.9 PG (ref 26–34)
MCHC RBC AUTO-ENTMCNC: 32.9 G/DL (ref 31–37)
MCV RBC AUTO: 90.9 FL
MICROALBUMIN UR-MCNC: 3.12 MG/DL
MICROALBUMIN/CREAT 24H UR-RTO: 17.6 UG/MG (ref ?–30)
MONOCYTES # BLD AUTO: 0.66 X10(3) UL (ref 0.1–1)
MONOCYTES NFR BLD AUTO: 5.5 %
NEUTROPHILS # BLD AUTO: 9.24 X10 (3) UL (ref 1.5–7.7)
NEUTROPHILS # BLD AUTO: 9.24 X10(3) UL (ref 1.5–7.7)
NEUTROPHILS NFR BLD AUTO: 77.5 %
NONHDLC SERPL-MCNC: 158 MG/DL (ref ?–130)
OSMOLALITY SERPL CALC.SUM OF ELEC: 302 MOSM/KG (ref 275–295)
PLATELET # BLD AUTO: 312 10(3)UL (ref 150–450)
POTASSIUM SERPL-SCNC: 3.7 MMOL/L (ref 3.5–5.1)
PROT SERPL-MCNC: 6.7 G/DL (ref 6.4–8.2)
RBC # BLD AUTO: 4.61 X10(6)UL
SODIUM SERPL-SCNC: 143 MMOL/L (ref 136–145)
T4 FREE SERPL-MCNC: 1 NG/DL (ref 0.8–1.7)
TRIGL SERPL-MCNC: 137 MG/DL (ref 30–149)
TSI SER-ACNC: 4.49 MIU/ML (ref 0.36–3.74)
VIT D+METAB SERPL-MCNC: 37.2 NG/ML (ref 30–100)
VLDLC SERPL CALC-MCNC: 25 MG/DL (ref 0–30)
WBC # BLD AUTO: 11.9 X10(3) UL (ref 4–11)

## 2023-07-23 PROCEDURE — 3061F NEG MICROALBUMINURIA REV: CPT | Performed by: INTERNAL MEDICINE

## 2023-07-23 PROCEDURE — 80061 LIPID PANEL: CPT

## 2023-07-23 PROCEDURE — 36415 COLL VENOUS BLD VENIPUNCTURE: CPT

## 2023-07-23 PROCEDURE — 82043 UR ALBUMIN QUANTITATIVE: CPT

## 2023-07-23 PROCEDURE — 80053 COMPREHEN METABOLIC PANEL: CPT

## 2023-07-23 PROCEDURE — 3044F HG A1C LEVEL LT 7.0%: CPT | Performed by: INTERNAL MEDICINE

## 2023-07-23 PROCEDURE — 84439 ASSAY OF FREE THYROXINE: CPT

## 2023-07-23 PROCEDURE — 82306 VITAMIN D 25 HYDROXY: CPT

## 2023-07-23 PROCEDURE — 83036 HEMOGLOBIN GLYCOSYLATED A1C: CPT

## 2023-07-23 PROCEDURE — 84443 ASSAY THYROID STIM HORMONE: CPT

## 2023-07-23 PROCEDURE — 82570 ASSAY OF URINE CREATININE: CPT

## 2023-07-23 PROCEDURE — 85025 COMPLETE CBC W/AUTO DIFF WBC: CPT

## 2023-07-27 ENCOUNTER — OFFICE VISIT (OUTPATIENT)
Dept: PHYSICAL THERAPY | Age: 68
End: 2023-07-27
Attending: PHYSICAL MEDICINE & REHABILITATION
Payer: COMMERCIAL

## 2023-07-27 PROCEDURE — 97110 THERAPEUTIC EXERCISES: CPT | Performed by: PHYSICAL THERAPIST

## 2023-07-27 NOTE — PROGRESS NOTES
Dx: Shoulder impingement syndrome, right (M75.41)            Insurance (Authorized # of Visits):  10-12           Authorizing Physician: Dr. Arcadio Benitez  Next MD visit: none scheduled  Fall Risk: standard         Precautions: n/a           Medication changes per patient? NO  Date of evaluation/PN: 2023  Pain ratin  Subjective: Reports feeling soreness after sessions but no longer gets crepitus and regaining full shoulder movement. Objective: instructed on on icing after sessions/HEP to reduce DOMS. Assessment:   Abbreviated session today due to work schedule. Likely DOMS resulting in soreness after sessions but regaining AROM and needs continued strengthening to reduce DOMS with increased functional use of Ue's. Goals: In progress as follows:  Lacy Alejandro will have decreased rest pain to <3/10 to allow uninterrupted sleep hygiene and reduced tissue irritability. Lacy Alejandro will have return to WNL and painfree (R) shoulder AROM to allow OH reaching without symptom provocation. Lacy Alejandro will have increased (R) shoulder strength to 4+/5 to allow use of (R)UE for lifting and carrying without symptom aggravation. Lacy Alejandro will be (I) with a home program to allow discharge from PT towards further self-recovery and maintenance of function. Plan: Continue to progress proximal stabilization/strengthening and RC strengthening.    Date: 2023  TX#: 2/10-12 Date:2023                 TX#: 3/10-12 Date:2023                TX#: 4/10-12 Date: 2023              TX#: 5/10-12 Date: 2023  Tx#:    THERAPEUTIC EX 38 mins 30 mins 38 mins 38 mins 38 mins   OH pulley 4' ea flexion/scaption 4' ea flexion/scaption/ext-IR 4' ea flexion/scaption/ext-IR 4' ea flexion/scaption/ext-IR    Scapular retraction with shoulder ER Yellow tband with back against foam roll 2x10       Wall angels  2x10       Prone scapular retraction 3x10 with manual cueing       Prone saws 2x10 2x10 1lb 3x10     Prone MT/rhomboid 2x10 ea 2x10 ea 1lb 3x10 ea     Static hold Supine 2 x 30 secs at 90 flexion       Prone LT  2x10 1lb 3x10     Supine serratus punches  2x10 1lb 3x10     Supine flexion  Dowel 3x10      (R) shoulder AAROM   10 mins     Scifit UE only    4' ea fwd/retro 4' ea fwd/retro 5' ea fwd/retro   Shoulder ER   Yellow tband 3x10  Red tband 3x10   SL shoulder abd   1lb 3x10     SL shoulder ER   1lb 3x10     Standing shoulder flexion/scaption    1lb 3x10 ea 2lbs 3x10 ea with yoga ball scap squeeze   SB push up plus    RSB 3x10 RSB 3x10   SB wall walk with swims    RSB 3x10 RSB 3x10   Curl to press    1lb 3x10    Lat pull down    Yellow tband 3x10 Red tband 3x10   Shoulder W     Red tband 3x10   High rows     Red tband 3x10   Low rows     Red tband 3x10   Dynamic hugs     Red tband 3x10                   NEUROMUSCULAR RE-EDUCATION  8 mins 8 mins 8 mins    Shoulder alphabet  Standing 60 flexion upper and lower case  Standing 90 flexion upper/lower case Standing 90 flexion and scaption upper/lower case    Rhythmic stabilization  Supine 90 and 120 flexion 2 x 1 min ea Supine 90 and 120 flexion 2 x 1 min ea Supine 90 and 120 flexion 2 x 1 min ea            MANUAL TX 8 mins 8 mins      Thoracic PA's Gr 2-3 x 2 mins       STM  UT/periscapular mm      (R) scapulothoracic and GH mobs Gr 2-3 prone x 6 mins Gr 2-3 prone/supine      HEP: Continue current HEP. Charges: Therapeutic ex x 3       Total Timed Treatment: 38 min  Total Treatment Time: 38 min    Plan: Continue to progress strengthening and functional load handling.

## 2023-07-31 ENCOUNTER — LAB ENCOUNTER (OUTPATIENT)
Dept: LAB | Age: 68
End: 2023-07-31
Attending: INTERNAL MEDICINE
Payer: COMMERCIAL

## 2023-07-31 ENCOUNTER — OFFICE VISIT (OUTPATIENT)
Dept: INTERNAL MEDICINE CLINIC | Facility: CLINIC | Age: 68
End: 2023-07-31

## 2023-07-31 ENCOUNTER — HOSPITAL ENCOUNTER (OUTPATIENT)
Dept: GENERAL RADIOLOGY | Age: 68
Discharge: HOME | End: 2023-07-31
Attending: INTERNAL MEDICINE
Payer: COMMERCIAL

## 2023-07-31 VITALS
BODY MASS INDEX: 44.29 KG/M2 | HEART RATE: 80 BPM | OXYGEN SATURATION: 97 % | HEIGHT: 59 IN | DIASTOLIC BLOOD PRESSURE: 80 MMHG | TEMPERATURE: 98 F | SYSTOLIC BLOOD PRESSURE: 126 MMHG | WEIGHT: 219.69 LBS

## 2023-07-31 DIAGNOSIS — G47.33 OSA (OBSTRUCTIVE SLEEP APNEA): ICD-10-CM

## 2023-07-31 DIAGNOSIS — M25.552 ACUTE HIP PAIN, LEFT: ICD-10-CM

## 2023-07-31 DIAGNOSIS — I25.10 CORONARY ARTERY DISEASE INVOLVING NATIVE CORONARY ARTERY OF NATIVE HEART WITHOUT ANGINA PECTORIS: ICD-10-CM

## 2023-07-31 DIAGNOSIS — E03.9 ACQUIRED HYPOTHYROIDISM: Primary | ICD-10-CM

## 2023-07-31 DIAGNOSIS — D72.829 LEUKOCYTOSIS, UNSPECIFIED TYPE: ICD-10-CM

## 2023-07-31 DIAGNOSIS — E11.9 TYPE 2 DIABETES MELLITUS WITHOUT RETINOPATHY (HCC): ICD-10-CM

## 2023-07-31 DIAGNOSIS — I10 ESSENTIAL HYPERTENSION: ICD-10-CM

## 2023-07-31 DIAGNOSIS — J44.9 CHRONIC OBSTRUCTIVE PULMONARY DISEASE, UNSPECIFIED COPD TYPE (HCC): ICD-10-CM

## 2023-07-31 DIAGNOSIS — E78.00 PURE HYPERCHOLESTEROLEMIA: ICD-10-CM

## 2023-07-31 LAB
BASOPHILS # BLD AUTO: 0.06 X10(3) UL (ref 0–0.2)
BASOPHILS NFR BLD AUTO: 0.5 %
EOSINOPHIL # BLD AUTO: 0.08 X10(3) UL (ref 0–0.7)
EOSINOPHIL NFR BLD AUTO: 0.7 %
ERYTHROCYTE [DISTWIDTH] IN BLOOD BY AUTOMATED COUNT: 14.5 %
HCT VFR BLD AUTO: 43.5 %
HGB BLD-MCNC: 13.8 G/DL
IMM GRANULOCYTES # BLD AUTO: 0.1 X10(3) UL (ref 0–1)
IMM GRANULOCYTES NFR BLD: 0.9 %
LYMPHOCYTES # BLD AUTO: 1.84 X10(3) UL (ref 1–4)
LYMPHOCYTES NFR BLD AUTO: 16.5 %
MCH RBC QN AUTO: 29.9 PG (ref 26–34)
MCHC RBC AUTO-ENTMCNC: 31.7 G/DL (ref 31–37)
MCV RBC AUTO: 94.4 FL
MONOCYTES # BLD AUTO: 0.65 X10(3) UL (ref 0.1–1)
MONOCYTES NFR BLD AUTO: 5.8 %
NEUTROPHILS # BLD AUTO: 8.44 X10 (3) UL (ref 1.5–7.7)
NEUTROPHILS # BLD AUTO: 8.44 X10(3) UL (ref 1.5–7.7)
NEUTROPHILS NFR BLD AUTO: 75.6 %
PLATELET # BLD AUTO: 286 10(3)UL (ref 150–450)
RBC # BLD AUTO: 4.61 X10(6)UL
WBC # BLD AUTO: 11.2 X10(3) UL (ref 4–11)

## 2023-07-31 PROCEDURE — 3079F DIAST BP 80-89 MM HG: CPT | Performed by: INTERNAL MEDICINE

## 2023-07-31 PROCEDURE — 73502 X-RAY EXAM HIP UNI 2-3 VIEWS: CPT | Performed by: INTERNAL MEDICINE

## 2023-07-31 PROCEDURE — 99214 OFFICE O/P EST MOD 30 MIN: CPT | Performed by: INTERNAL MEDICINE

## 2023-07-31 PROCEDURE — 3074F SYST BP LT 130 MM HG: CPT | Performed by: INTERNAL MEDICINE

## 2023-07-31 PROCEDURE — 85025 COMPLETE CBC W/AUTO DIFF WBC: CPT

## 2023-07-31 PROCEDURE — 36415 COLL VENOUS BLD VENIPUNCTURE: CPT

## 2023-07-31 PROCEDURE — 3008F BODY MASS INDEX DOCD: CPT | Performed by: INTERNAL MEDICINE
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
## 2023-08-01 ENCOUNTER — APPOINTMENT (OUTPATIENT)
Dept: PHYSICAL THERAPY | Age: 68
End: 2023-08-01
Attending: PHYSICAL MEDICINE & REHABILITATION
Payer: COMMERCIAL

## 2023-08-02 ENCOUNTER — TELEPHONE (OUTPATIENT)
Dept: PHYSICAL MEDICINE AND REHAB | Facility: CLINIC | Age: 68
End: 2023-08-02

## 2023-08-02 ENCOUNTER — TELEPHONE (OUTPATIENT)
Dept: INTERNAL MEDICINE CLINIC | Facility: CLINIC | Age: 68
End: 2023-08-02

## 2023-08-02 ENCOUNTER — OFFICE VISIT (OUTPATIENT)
Dept: PHYSICAL MEDICINE AND REHAB | Facility: CLINIC | Age: 68
End: 2023-08-02
Payer: COMMERCIAL

## 2023-08-02 VITALS
DIASTOLIC BLOOD PRESSURE: 76 MMHG | SYSTOLIC BLOOD PRESSURE: 140 MMHG | WEIGHT: 221 LBS | HEIGHT: 59 IN | BODY MASS INDEX: 44.55 KG/M2

## 2023-08-02 DIAGNOSIS — M75.41 SHOULDER IMPINGEMENT SYNDROME, RIGHT: Primary | ICD-10-CM

## 2023-08-02 DIAGNOSIS — D72.829 LEUKOCYTOSIS, UNSPECIFIED TYPE: Primary | ICD-10-CM

## 2023-08-02 DIAGNOSIS — E11.9 TYPE 2 DIABETES MELLITUS WITHOUT RETINOPATHY (HCC): ICD-10-CM

## 2023-08-02 PROCEDURE — 3077F SYST BP >= 140 MM HG: CPT | Performed by: PHYSICAL MEDICINE & REHABILITATION

## 2023-08-02 PROCEDURE — 99213 OFFICE O/P EST LOW 20 MIN: CPT | Performed by: PHYSICAL MEDICINE & REHABILITATION

## 2023-08-02 PROCEDURE — 3078F DIAST BP <80 MM HG: CPT | Performed by: PHYSICAL MEDICINE & REHABILITATION

## 2023-08-02 PROCEDURE — 3008F BODY MASS INDEX DOCD: CPT | Performed by: PHYSICAL MEDICINE & REHABILITATION

## 2023-08-03 ENCOUNTER — OFFICE VISIT (OUTPATIENT)
Dept: PHYSICAL THERAPY | Age: 68
End: 2023-08-03
Attending: PHYSICAL MEDICINE & REHABILITATION
Payer: COMMERCIAL

## 2023-08-03 PROCEDURE — 97112 NEUROMUSCULAR REEDUCATION: CPT | Performed by: PHYSICAL THERAPIST

## 2023-08-03 PROCEDURE — 97110 THERAPEUTIC EXERCISES: CPT | Performed by: PHYSICAL THERAPIST

## 2023-08-03 NOTE — PROGRESS NOTES
Dx: Shoulder impingement syndrome, right (M75.41)            Insurance (Authorized # of Visits):  10-12           Authorizing Physician: Dr. Lotus Ash  Next MD visit: none scheduled  Fall Risk: standard         Precautions: n/a           Medication changes per patient? NO  Date of evaluation/PN: 2023  Pain ratin  Subjective: Reports only gets pain with reaching behind her back and resolves at rest.    Objective: (R) shoulder Ext/IR 4\" lower than (L)    Assessment:   Improved OH and horizontal reaching and residual deficits mostly with reaching behind the back; focused session on restoring posterior capsule mobility. Goals: In progress as follows:  Linh Le will have decreased rest pain to <3/10 to allow uninterrupted sleep hygiene and reduced tissue irritability. Linh Le will have return to WNL and painfree (R) shoulder AROM to allow OH reaching without symptom provocation. Linh Le will have increased (R) shoulder strength to 4+/5 to allow use of (R)UE for lifting and carrying without symptom aggravation. Linh Le will be (I) with a home program to allow discharge from PT towards further self-recovery and maintenance of function. Plan: Continue to progress proximal stabilization/strengthening and RC strengthening.    Date: 2023  TX#: 2/10-12 Date:2023                 TX#: 3/10-12 Date:2023                TX#: 4/10-12 Date: 2023              TX#: 5/10-12 Date: 2023  Tx#:  Date: 8/3/2023  Tx#:    THERAPEUTIC EX 38 mins 30 mins 38 mins 38 mins 38 mins 38 mins   OH pulley 4' ea flexion/scaption 4' ea flexion/scaption/ext-IR 4' ea flexion/scaption/ext-IR 4' ea flexion/scaption/ext-IR  Ext/IR x 3 mins   Scapular retraction with shoulder ER Yellow tband with back against foam roll 2x10        Wall angels  2x10        Prone scapular retraction 3x10 with manual cueing        Prone saws 2x10 2x10 1lb 3x10      Prone MT/rhomboid 2x10 ea 2x10 ea 1lb 3x10 ea      Static hold Supine 2 x 30 secs at 90 flexion        Prone LT  2x10 1lb 3x10      Supine serratus punches  2x10 1lb 3x10      Supine flexion  Dowel 3x10       (R) shoulder AAROM   10 mins      Scifit UE only    4' ea fwd/retro 4' ea fwd/retro 5' ea fwd/retro 5' ea fwd/retro   Shoulder ER   Yellow tband 3x10  Red tband 3x10 Green tband 3x10   SL shoulder abd   1lb 3x10      SL shoulder ER   1lb 3x10      Standing shoulder flexion/scaption    1lb 3x10 ea 2lbs 3x10 ea with yoga ball scap squeeze    SB push up plus    RSB 3x10 RSB 3x10 RSB 3x10   SB wall walk with swims    RSB 3x10 RSB 3x10 RSB 3x10   Curl to press    1lb 3x10     Lat pull down    Yellow tband 3x10 Red tband 3x10 Green tband 3x10   Shoulder W     Red tband 3x10 Green tband 3x10   High rows     Red tband 3x10 Green tband 3x10   Low rows     Red tband 3x10 Green tband 3x10   Dynamic hugs     Red tband 3x10 Green tband 3x10   (R) shoulder hyperextension self-stretch in IR      3x10   (R) shoulder ext/IR stretch with strap      3x10            NEUROMUSCULAR RE-EDUCATION  8 mins 8 mins 8 mins  8 mins   Shoulder alphabet  Standing 60 flexion upper and lower case  Standing 90 flexion upper/lower case Standing 90 flexion and scaption upper/lower case     Rhythmic stabilization  Supine 90 and 120 flexion 2 x 1 min ea Supine 90 and 120 flexion 2 x 1 min ea Supine 90 and 120 flexion 2 x 1 min ea  Standing 90 flexion /scaption fist on yoga ball against wall 2 x 1 min ea   Behind the back pass      1lb dumbell 15 ea CW/CCW            MANUAL TX 8 mins 8 mins       Thoracic PA's Gr 2-3 x 2 mins        STM  UT/periscapular mm       (R) scapulothoracic and GH mobs Gr 2-3 prone x 6 mins Gr 2-3 prone/supine       HEP: Continue current HEP. Charges: Therapeutic ex x 3; Neuromuscular re-education x 1       Total Timed Treatment: 46 min  Total Treatment Time: 46 min    Plan: Check response to increased stretching in HEP.

## 2023-08-04 ENCOUNTER — PATIENT MESSAGE (OUTPATIENT)
Dept: ENDOCRINOLOGY CLINIC | Facility: CLINIC | Age: 68
End: 2023-08-04

## 2023-08-08 ENCOUNTER — APPOINTMENT (OUTPATIENT)
Dept: PHYSICAL THERAPY | Age: 68
End: 2023-08-08
Attending: PHYSICAL MEDICINE & REHABILITATION
Payer: COMMERCIAL

## 2023-08-10 ENCOUNTER — TELEPHONE (OUTPATIENT)
Dept: PHYSICAL THERAPY | Facility: HOSPITAL | Age: 68
End: 2023-08-10

## 2023-08-10 ENCOUNTER — APPOINTMENT (OUTPATIENT)
Dept: PHYSICAL THERAPY | Age: 68
End: 2023-08-10
Attending: PHYSICAL MEDICINE & REHABILITATION
Payer: COMMERCIAL

## 2023-08-20 DIAGNOSIS — I10 ESSENTIAL HYPERTENSION: ICD-10-CM

## 2023-08-20 DIAGNOSIS — J44.1 COPD EXACERBATION (HCC): ICD-10-CM

## 2023-08-21 ENCOUNTER — OFFICE VISIT (OUTPATIENT)
Dept: ENDOCRINOLOGY CLINIC | Facility: CLINIC | Age: 68
End: 2023-08-21

## 2023-08-21 VITALS
DIASTOLIC BLOOD PRESSURE: 78 MMHG | SYSTOLIC BLOOD PRESSURE: 131 MMHG | BODY MASS INDEX: 44.15 KG/M2 | WEIGHT: 219 LBS | HEART RATE: 90 BPM | HEIGHT: 59 IN

## 2023-08-21 DIAGNOSIS — E03.9 HYPOTHYROIDISM, UNSPECIFIED TYPE: Primary | ICD-10-CM

## 2023-08-21 DIAGNOSIS — E11.69 TYPE 2 DIABETES MELLITUS WITH OTHER SPECIFIED COMPLICATION, WITHOUT LONG-TERM CURRENT USE OF INSULIN (HCC): ICD-10-CM

## 2023-08-21 DIAGNOSIS — E78.5 DYSLIPIDEMIA: ICD-10-CM

## 2023-08-21 LAB
GLUCOSE BLOOD: 97
TEST STRIP LOT #: NORMAL NUMERIC

## 2023-08-21 PROCEDURE — 3008F BODY MASS INDEX DOCD: CPT | Performed by: INTERNAL MEDICINE

## 2023-08-21 PROCEDURE — 3078F DIAST BP <80 MM HG: CPT | Performed by: INTERNAL MEDICINE

## 2023-08-21 PROCEDURE — 3075F SYST BP GE 130 - 139MM HG: CPT | Performed by: INTERNAL MEDICINE

## 2023-08-21 PROCEDURE — 82947 ASSAY GLUCOSE BLOOD QUANT: CPT | Performed by: INTERNAL MEDICINE

## 2023-08-21 PROCEDURE — 99214 OFFICE O/P EST MOD 30 MIN: CPT | Performed by: INTERNAL MEDICINE

## 2023-08-21 RX ORDER — TIRZEPATIDE 10 MG/.5ML
10 INJECTION, SOLUTION SUBCUTANEOUS
Qty: 6 ML | Refills: 0 | Status: SHIPPED | OUTPATIENT
Start: 2023-08-21

## 2023-08-21 RX ORDER — ROSUVASTATIN CALCIUM 20 MG/1
20 TABLET, COATED ORAL DAILY
Qty: 90 TABLET | Refills: 1 | Status: SHIPPED | OUTPATIENT
Start: 2023-08-21

## 2023-08-21 RX ORDER — LEVOTHYROXINE SODIUM 0.12 MG/1
125 TABLET ORAL
Qty: 90 TABLET | Refills: 0 | Status: SHIPPED | OUTPATIENT
Start: 2023-08-21

## 2023-08-21 RX ORDER — AZITHROMYCIN 250 MG/1
TABLET, FILM COATED ORAL
Qty: 6 TABLET | Refills: 0 | OUTPATIENT
Start: 2023-08-21

## 2023-08-21 NOTE — PROGRESS NOTES
Return Office Visit     CHIEF COMPLAINT:  Type 2 DM     Hypothyroidism    HISTORY OF PRESENT ILLNESS:  Christopher Villegas is a 74year old female who presents for follow up for for Type 2 DM and hypothyroidism     DM HISTORY  Diagnosed:  2018     HISTORY OF DIABETES COMPLICATIONS: :  History of Retinopathy: No - last eye exam: Aug 2022. Will schedule for this year   History of Neuropathy: no  History of Nephropathy: no     ASSOCIATED COMPLICATIONS:   HTN: yes  Hyperlipidemia: yes  Coronary Artery Disease:  no  Cerebrovascular Disease: no        HOME GLUCOSE READINGS:   Checks sugars a few times week   100-120 on most occasions     No symptoms of hypoglycemia. CURRENT DIABETIC MEDICATIONS INCLUDE  Mounjaro 10  mg weekly          MEALS:  Dietary compliance with a low carb diet is moderate    EXERCISE:  No     HYPOTHYROIDISM:   She is on LT4 125 mcg PO daily  Ran out weeks ago, not sure why? Discussed to call if she runs out of medication   Takes empty stomach   + fatigue  + weight gain     CURRENT MEDICATION:    Current Outpatient Medications   Medication Sig Dispense Refill    levothyroxine 125 MCG Oral Tab Take 1 tablet (125 mcg total) by mouth before breakfast. 90 tablet 0    rosuvastatin 20 MG Oral Tab Take 1 tablet (20 mg total) by mouth daily. 90 tablet 1    Tirzepatide (MOUNJARO) 10 MG/0.5ML Subcutaneous Solution Pen-injector Inject 10 mg into the skin every 7 days. 6 mL 0    lisinopril-hydroCHLOROthiazide 20-25 MG Oral Tab Take 1 tablet by mouth daily. 90 tablet 1    Phentermine HCl 37.5 MG Oral Tab Take 1 tablet (37.5 mg total) by mouth every morning before breakfast. 90 tablet 1    Clobetasol Propionate (CLOBEX) 0.05 % External Shampoo Apply to scalp leave on 5-10mintues, lather, rinse out 3 times weekly as directed 120 mL 11    metRONIDAZOLE 0.75 % External Cream Use bid to red areas on face 60 g 12    hydrocortisone 2.5 % External Cream Apply 1 Application topically 2 (two) times daily.  Apply every morning and evening, taper to every day after 2 weeks, then every other day 30 g 0    ketoconazole 2 % External Cream Apply 1 Application topically 2 (two) times daily. Apply to affected area 2 times daily. Under breasts and at eyebrows and around nose. 60 g 11    clobetasol 0.05 % External Ointment       metoprolol succinate ER 25 MG Oral Tablet 24 Hr Take 1 tablet (25 mg total) by mouth daily. 90 tablet 1    triamcinolone 0.5 % External Cream Apply 1 Application topically 2 (two) times daily. 30 g 0    Budesonide-Formoterol Fumarate (SYMBICORT) 160-4.5 MCG/ACT Inhalation Aerosol Inhale 2 puffs into the lungs 2 (two) times daily. 1 each 5    clotrimazole-betamethasone 1-0.05 % External Cream Apply 1 Application topically 2 (two) times daily. 45 g 2    Ketoconazole 2 % External Shampoo Apply to scalp 3 times per week. 240 mL 12    albuterol 108 (90 Base) MCG/ACT Inhalation Aero Soln Inhale 1 puff into the lungs every 6 (six) hours as needed for Wheezing. (Patient not taking: Reported on 7/31/2023) 1 each 0    benzonatate 200 MG Oral Cap Take 1 capsule (200 mg total) by mouth 3 (three) times daily as needed for cough. (Patient not taking: Reported on 8/21/2023) 30 capsule 0         ALLERGY:  No Known Allergies    PAST MEDICAL, SOCIAL AND FAMILY HISTORY:  See past medical history marked as reviewed. See past surgical history marked as reviewed. See past family history marked as reviewed. See past social history marked as reviewed.     ASSESSMENTS:     REVIEW OF SYSTEMS:  Constitutional: Negative for:  fever,  + fatigue, cold/heat intolerance,  + weight changes  Eyes: Negative for:  Visual changes, proptosis, blurring  ENT: Negative for:  dysphagia, neck swelling, dysphonia  Respiratory: Negative for:  dyspnea, cough  Cardiovascular: Negative for:  chest pain, palpitations, orthopnea  GI: Negative for:  abdominal pain, nausea, vomiting, diarrhea, constipation, bleeding  Neurology: Negative for: headache, numbness, weakness  Genito-Urinary: Negative for: dysuria, frequency  Psychiatric: Negative for:  depression, anxiety  Hematology/Lymphatics: Negative for: bruising, lower extremity edema  Endocrine: Negative for: polyuria, polydypsia  Skin: Negative for: rash, blister, cellulitis,       PHYSICAL EXAM:  Vitals reviewed    General Appearance:  alert, well developed, in no acute distress  Head: Atraumatic  Eyes:  normal conjunctivae, sclera. , normal sclera and normal pupils  Throat/Neck: normal sound to voice. Normal hearing, normal speech  Respiratory:  Speaking in full sentences, non-labored. no increased work of breathing, no audible wheezing    Neuro: motor grossly intact, moving all extremities without difficulty  Psychiatric:  oriented to time, self, and place  Extremities:   Bilateral barefoot skin diabetic exam is normal, visualized feet and the appearance is normal.  Bilateral monofilament/sensation of both feet is normal.  Pulsation pedal pulse exam of both lower legs/feet is normal as well. ASSESSMENT AND PLAN:      1. Type 2 DM  A1c is 6.5 %  ( 8/2023)  -Reviewed the pathogenesis, natural course of diabetes. Patient understands the importance of glycemic control and the implications of uncontrolled diabetes including Diabetic ketoacidosis and various micro vascular and macrovascular complications. - Mounjaro  10 mg subcutaneous weekly  No personal or family history of MEN syndrome  Patient counselled regarding side effects including injection site reactions, nausea, vomiting, diarrhea, pancreatitis, gastroparesis and rare side effect sintia Patrick syndrome. Check and call with BG as discussed     Last Ur Ma normal     Up to date with eye exam    2.  .Dyslipidemia  LDL is high   Reports compliance with rosuvastatin 20 mg daily   Attributed high LDL to not eating well  -Reviewed importance of low fat diet  She will work on low at diet , Fasting lipid panel in three months  If LDL is still high, will go up on the dose   3. Hypothyroidism  TSH is high  Clinically hypothyroid  She states that she was out of thyroid medication for days to weeks before the blood test   Refilled LT4 125 mcg daily , discussed compliance   -Recheck TSH in 8-10 weeks  Reviewed compliance and admnistration    Patient verbalized a complete  understanding of all of the above and did not have any further questions.        RTC in 4-5 months

## 2023-08-22 ENCOUNTER — TELEPHONE (OUTPATIENT)
Dept: PHYSICAL THERAPY | Age: 68
End: 2023-08-22

## 2023-08-22 ENCOUNTER — APPOINTMENT (OUTPATIENT)
Dept: PHYSICAL THERAPY | Age: 68
End: 2023-08-22
Attending: PHYSICAL MEDICINE & REHABILITATION
Payer: COMMERCIAL

## 2023-08-22 RX ORDER — METOPROLOL SUCCINATE 25 MG/1
25 TABLET, EXTENDED RELEASE ORAL DAILY
Qty: 90 TABLET | Refills: 1 | Status: SHIPPED | OUTPATIENT
Start: 2023-08-22

## 2023-08-22 NOTE — TELEPHONE ENCOUNTER
Please review; protocol failed/no protocol.      Requested Prescriptions   Pending Prescriptions Disp Refills    METOPROLOL SUCCINATE ER 25 MG Oral Tablet 24 Hr [Pharmacy Med Name: METOPROLOL ER SUCCINATE 25MG TABS] 90 tablet 1     Sig: TAKE 1 TABLET(25 MG) BY MOUTH DAILY       Hypertensive Medications Protocol Failed - 8/20/2023  6:39 PM        Failed - Last BP reading less than 140/90     BP Readings from Last 1 Encounters:  08/21/23 : 131/78              Passed - In person appointment in the past 12 or next 3 months     Recent Outpatient Visits              Today Hypothyroidism, unspecified type    Monica Hooper MD    Office Visit    2 weeks ago     Ceci in [facility] Suresh Mott, [place]    Office Visit    2 weeks ago Shoulder impingement syndrome, right    [address], [address] [facility],[address], [facility], Byron Hook MD    Office Visit    3 weeks ago Acquired hypothyroidism    [address] [facility], Rc Trevino MD    Office Visit    3 weeks ago     Ceci in [place], Suresh DennisCameron, [place]    Office Visit          Future Appointments         Provider Department Appt Notes    [address], [place], [address] visits  [place]  no c/p, 60v pcy    In 3 days [id number] [facility], [facility],[address], Suresh [place]ott, [address] visits  [place]  no c/p, 60v pcy    In 3 months Shama Palacios MD [address], [address] [facility],[address], [facility] or BMP in past 6 months     Recent Results (from the past 4392 hour(s))   COMP METABOLIC PANEL (14)    Collection Time: 07/23/23  7:59 AM   Result Value Ref Range    Glucose 100 (H) 70 - 99 mg/dL    Sodium 143 136 - 145 mmol/L    Potassium 3.7 3.5 - 5.1 mmol/L    Chloride 107 98 - 112 mmol/L    CO2 29.0 21.0 - 32.0 mmol/L    Anion Gap 7 0 - 18 mmol/L    BUN 29 (H) 7 - 18 mg/dL    Creatinine 0.79 0.55 - 1.02 mg/dL    BUN/CREA Ratio 36.7 (H) 10.0 - 20.0    Calcium, Total 8.8 8.5 - 10.1 mg/dL    Calculated Osmolality 302 (H) 275 - 295 mOsm/kg    eGFR-Cr 83 >=60 mL/min/1.73m2    ALT 21 13 - 56 U/L    AST 14 (L) 15 - 37 U/L    Alkaline Phosphatase 81 50 - 130 U/L    Bilirubin, Total 0.6 0.1 - 2.0 mg/dL    Total Protein 6.7 6.4 - 8.2 g/dL    Albumin 3.0 (L) 3.4 - 5.0 g/dL    Globulin  3.7 2.8 - 4.4 g/dL    A/G Ratio 0.8 (L) 1.0 - 2.0    Patient Fasting for CMP? Yes      *Note: Due to a large number of results and/or encounters for the requested time period, some results have not been displayed. A complete set of results can be found in Results Review.                Passed - In person appointment or virtual visit in the past 6 months     Recent Outpatient Visits              Today Hypothyroidism, unspecified type    Polo Dalton MD    Office Visit    2 weeks ago     BINH Hopper in [facility], [address] [place] [address] Visit    2 weeks ago Shoulder impingement syndrome, right    [address], [address] [facility],[address] [facility], Allie Christopher MD    Office Visit    3 weeks ago Acquired hypothyroidism    [address] [place] [address], My Valenzuela MD    Office Visit    3 weeks ago     BINH Hopper in [facility], [address] [place] [address] Visit          Future Appointments         Provider Department Appt Notes    [address], [place], [address] visits  [facility]  no c/p, 60v pcy    In 3 days Mary Annjosef Almeida, [address] visits  [facility]  no c/p, 60v pcy    In 3 months Chuck Luong MD [address], [address] [facility], [place]                Passed - [place] or GFRNAA > 50     GFR Evaluation  EGFRCR: 83 , resulted on 2023                Recent Outpatient Visits              Today Hypothyroidism, unspecified type    [place] Lorenza Cobian MD    Office Visit    2 weeks ago     BINH Hopper in [place], [place]    Office Visit    2 weeks ago Shoulder impingement syndrome, right    Farheen Cuff, [address], [facility], Trip Joyce MD    Office Visit    3 weeks ago Acquired hypothyroidism    Farheen Cuff, [address], Kendra Rios MD    Office Visit    3 weeks ago     BINH Hopper in [facility], [address] [facility] Visit             Future Appointments         Provider Department Appt Notes    Tomorrow [address] [facility],[address], [place], [address] [facility] 12 visits  [employer]  no c/p, 60v pcy    In 3 days Baldomero Leyva, [address] [facility] 12 visits  [place]  no c/p, 60v pcy    In 3 months Andrei Scanlon MD Farheen Cuff, [address], [facility]

## 2023-08-24 ENCOUNTER — APPOINTMENT (OUTPATIENT)
Dept: PHYSICAL THERAPY | Age: 68
End: 2023-08-24
Attending: PHYSICAL MEDICINE & REHABILITATION
Payer: COMMERCIAL

## 2023-08-25 RX ORDER — TIRZEPATIDE 12.5 MG/.5ML
12.5 INJECTION, SOLUTION SUBCUTANEOUS WEEKLY
Qty: 2 ML | Refills: 0 | Status: SHIPPED | OUTPATIENT
Start: 2023-08-25

## 2023-08-25 NOTE — TELEPHONE ENCOUNTER
Dr. Lisette Roy, patient asking for [facility] dose 12.5mg due to backorder issue of 10mg

## 2023-09-18 ENCOUNTER — TELEPHONE (OUTPATIENT)
Dept: PHYSICAL MEDICINE AND REHAB | Facility: CLINIC | Age: 68
End: 2023-09-18

## 2023-09-21 ENCOUNTER — OFFICE VISIT (OUTPATIENT)
Dept: INTERNAL MEDICINE CLINIC | Facility: CLINIC | Age: 68
End: 2023-09-21

## 2023-09-21 ENCOUNTER — LAB ENCOUNTER (OUTPATIENT)
Dept: LAB | Age: 68
End: 2023-09-21
Attending: INTERNAL MEDICINE
Payer: COMMERCIAL

## 2023-09-21 VITALS
WEIGHT: 222.38 LBS | DIASTOLIC BLOOD PRESSURE: 80 MMHG | HEIGHT: 59 IN | TEMPERATURE: 99 F | HEART RATE: 91 BPM | OXYGEN SATURATION: 96 % | SYSTOLIC BLOOD PRESSURE: 124 MMHG | BODY MASS INDEX: 44.83 KG/M2

## 2023-09-21 DIAGNOSIS — J02.9 SORE THROAT: ICD-10-CM

## 2023-09-21 DIAGNOSIS — J02.9 SORE THROAT: Primary | ICD-10-CM

## 2023-09-21 DIAGNOSIS — R05.1 ACUTE COUGH: ICD-10-CM

## 2023-09-21 PROCEDURE — 87635 SARS-COV-2 COVID-19 AMP PRB: CPT

## 2023-09-21 PROCEDURE — 87880 STREP A ASSAY W/OPTIC: CPT | Performed by: INTERNAL MEDICINE

## 2023-09-21 PROCEDURE — 99213 OFFICE O/P EST LOW 20 MIN: CPT | Performed by: INTERNAL MEDICINE

## 2023-09-21 PROCEDURE — 3079F DIAST BP 80-89 MM HG: CPT | Performed by: INTERNAL MEDICINE

## 2023-09-21 PROCEDURE — 3008F BODY MASS INDEX DOCD: CPT | Performed by: INTERNAL MEDICINE

## 2023-09-21 PROCEDURE — 3074F SYST BP LT 130 MM HG: CPT | Performed by: INTERNAL MEDICINE

## 2023-09-21 RX ORDER — AMOXICILLIN AND CLAVULANATE POTASSIUM 875; 125 MG/1; MG/1
1 TABLET, FILM COATED ORAL 2 TIMES DAILY
Qty: 14 TABLET | Refills: 0 | Status: SHIPPED | OUTPATIENT
Start: 2023-09-21

## 2023-09-21 RX ORDER — TIRZEPATIDE 12.5 MG/.5ML
12.5 INJECTION, SOLUTION SUBCUTANEOUS WEEKLY
Qty: 2 ML | Refills: 0 | Status: SHIPPED | OUTPATIENT
Start: 2023-09-21
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
## 2023-09-22 LAB — SARS-COV-2 RNA RESP QL NAA+PROBE: NOT DETECTED

## 2023-10-04 NOTE — TELEPHONE ENCOUNTER
Attempted to call patient to obtain details, told me to call her back in a few weeks and hung up phone. Sending [employer] message and placing forms on hold.

## 2023-10-11 ENCOUNTER — APPOINTMENT (OUTPATIENT)
Dept: GENERAL RADIOLOGY | Facility: HOSPITAL | Age: 68
End: 2023-10-11
Attending: STUDENT IN AN ORGANIZED HEALTH CARE EDUCATION/TRAINING PROGRAM
Payer: COMMERCIAL

## 2023-10-11 ENCOUNTER — HOSPITAL ENCOUNTER (EMERGENCY)
Facility: HOSPITAL | Age: 68
Discharge: HOME | End: 2023-10-11
Attending: STUDENT IN AN ORGANIZED HEALTH CARE EDUCATION/TRAINING PROGRAM
Payer: COMMERCIAL

## 2023-10-11 VITALS
DIASTOLIC BLOOD PRESSURE: 81 MMHG | TEMPERATURE: 98 F | SYSTOLIC BLOOD PRESSURE: 178 MMHG | HEART RATE: 77 BPM | OXYGEN SATURATION: 98 % | RESPIRATION RATE: 20 BRPM

## 2023-10-11 DIAGNOSIS — T14.8XXA MYALGIA, TRAUMATIC: Primary | ICD-10-CM

## 2023-10-11 PROCEDURE — 72040 X-RAY EXAM NECK SPINE 2-3 VW: CPT | Performed by: STUDENT IN AN ORGANIZED HEALTH CARE EDUCATION/TRAINING PROGRAM

## 2023-10-11 PROCEDURE — 99284 EMERGENCY DEPT VISIT MOD MDM: CPT

## 2023-10-11 PROCEDURE — 73130 X-RAY EXAM OF HAND: CPT | Performed by: STUDENT IN AN ORGANIZED HEALTH CARE EDUCATION/TRAINING PROGRAM

## 2023-10-11 PROCEDURE — 73502 X-RAY EXAM HIP UNI 2-3 VIEWS: CPT | Performed by: STUDENT IN AN ORGANIZED HEALTH CARE EDUCATION/TRAINING PROGRAM

## 2023-10-11 PROCEDURE — 73030 X-RAY EXAM OF SHOULDER: CPT | Performed by: STUDENT IN AN ORGANIZED HEALTH CARE EDUCATION/TRAINING PROGRAM

## 2023-10-11 PROCEDURE — 73560 X-RAY EXAM OF KNEE 1 OR 2: CPT | Performed by: STUDENT IN AN ORGANIZED HEALTH CARE EDUCATION/TRAINING PROGRAM

## 2023-10-11 RX ORDER — NAPROXEN 500 MG/1
500 TABLET ORAL 2 TIMES DAILY PRN
Qty: 14 TABLET | Refills: 0 | Status: SHIPPED | OUTPATIENT
Start: 2023-10-11 | End: 2023-10-25

## 2023-10-11 RX ORDER — CYCLOBENZAPRINE HCL 10 MG
5 TABLET ORAL 3 TIMES DAILY PRN
Qty: 10 TABLET | Refills: 0 | Status: SHIPPED | OUTPATIENT
Start: 2023-10-11 | End: 2023-10-18
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
## 2023-10-12 ENCOUNTER — TELEPHONE (OUTPATIENT)
Dept: INTERNAL MEDICINE CLINIC | Facility: CLINIC | Age: 68
End: 2023-10-12

## 2023-10-12 NOTE — TELEPHONE ENCOUNTER
Pt was stts she was seen in the ER a couple of days ago. Pt made appt with Dr. Pineda for next appt which is 10/27. Pt requests sooner appt with  Please advise

## 2023-10-13 ENCOUNTER — OFFICE VISIT (OUTPATIENT)
Dept: INTERNAL MEDICINE CLINIC | Facility: CLINIC | Age: 68
End: 2023-10-13
Payer: COMMERCIAL

## 2023-10-13 VITALS
BODY MASS INDEX: 45.48 KG/M2 | DIASTOLIC BLOOD PRESSURE: 83 MMHG | WEIGHT: 225.63 LBS | HEIGHT: 59 IN | TEMPERATURE: 98 F | HEART RATE: 71 BPM | OXYGEN SATURATION: 96 % | SYSTOLIC BLOOD PRESSURE: 144 MMHG

## 2023-10-13 DIAGNOSIS — M25.511 ACUTE PAIN OF RIGHT SHOULDER: Primary | ICD-10-CM

## 2023-10-13 DIAGNOSIS — M25.561 ACUTE PAIN OF RIGHT KNEE: ICD-10-CM

## 2023-10-13 PROCEDURE — 3008F BODY MASS INDEX DOCD: CPT | Performed by: INTERNAL MEDICINE

## 2023-10-13 PROCEDURE — 3077F SYST BP >= 140 MM HG: CPT | Performed by: INTERNAL MEDICINE

## 2023-10-13 PROCEDURE — 99213 OFFICE O/P EST LOW 20 MIN: CPT | Performed by: INTERNAL MEDICINE

## 2023-10-13 PROCEDURE — 3079F DIAST BP 80-89 MM HG: CPT | Performed by: INTERNAL MEDICINE

## 2023-10-13 RX ORDER — METOPROLOL SUCCINATE 25 MG/1
25 TABLET, EXTENDED RELEASE ORAL DAILY
Qty: 90 TABLET | Refills: 1 | Status: SHIPPED | OUTPATIENT
Start: 2023-10-13

## 2023-10-13 RX ORDER — ROSUVASTATIN CALCIUM 20 MG/1
20 TABLET, COATED ORAL DAILY
Qty: 90 TABLET | Refills: 1 | Status: SHIPPED | OUTPATIENT
Start: 2023-10-13

## 2023-10-13 RX ORDER — LEVOTHYROXINE SODIUM 0.12 MG/1
125 TABLET ORAL
Qty: 90 TABLET | Refills: 0 | Status: SHIPPED | OUTPATIENT
Start: 2023-10-13

## 2023-10-13 RX ORDER — LISINOPRIL AND HYDROCHLOROTHIAZIDE 25; 20 MG/1; MG/1
1 TABLET ORAL DAILY
Qty: 90 TABLET | Refills: 1 | Status: SHIPPED | OUTPATIENT
Start: 2023-10-13
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
## 2023-10-16 ENCOUNTER — TELEPHONE (OUTPATIENT)
Dept: INTERNAL MEDICINE CLINIC | Facility: CLINIC | Age: 68
End: 2023-10-16

## 2023-10-16 DIAGNOSIS — Z12.31 ENCOUNTER FOR SCREENING MAMMOGRAM FOR MALIGNANT NEOPLASM OF BREAST: Primary | ICD-10-CM

## 2023-10-16 NOTE — TELEPHONE ENCOUNTER
Patient called, verified Name and . She wants to know if she can get a mammogram referral. Screening mammogram placed per protocol. Appointment reminders also reviewed.

## 2023-10-17 ENCOUNTER — TELEPHONE (OUTPATIENT)
Dept: INTERNAL MEDICINE CLINIC | Facility: CLINIC | Age: 68
End: 2023-10-17

## 2023-10-17 ENCOUNTER — TELEPHONE (OUTPATIENT)
Dept: CASE MANAGEMENT | Age: 68
End: 2023-10-17

## 2023-10-17 DIAGNOSIS — M79.10 MYALGIA: Primary | ICD-10-CM

## 2023-10-17 DIAGNOSIS — M25.531 RIGHT WRIST PAIN: ICD-10-CM

## 2023-10-17 NOTE — TELEPHONE ENCOUNTER
Dr. Lamont Menjivar,     The patient called requesting referral to   Dr. Bhavana Smith. Pended referral please review diagnosis and sign off if you agree. Thank you.   [facility]
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
## 2023-10-24 ENCOUNTER — OFFICE VISIT (OUTPATIENT)
Dept: INTERNAL MEDICINE CLINIC | Facility: CLINIC | Age: 68
End: 2023-10-24

## 2023-10-24 ENCOUNTER — LAB ENCOUNTER (OUTPATIENT)
Dept: LAB | Facility: HOSPITAL | Age: 68
End: 2023-10-24
Attending: INTERNAL MEDICINE

## 2023-10-24 VITALS
WEIGHT: 222.38 LBS | OXYGEN SATURATION: 97 % | BODY MASS INDEX: 44.83 KG/M2 | HEIGHT: 59 IN | TEMPERATURE: 98 F | HEART RATE: 85 BPM | SYSTOLIC BLOOD PRESSURE: 124 MMHG | DIASTOLIC BLOOD PRESSURE: 80 MMHG

## 2023-10-24 DIAGNOSIS — E78.5 DYSLIPIDEMIA: ICD-10-CM

## 2023-10-24 DIAGNOSIS — D72.829 LEUKOCYTOSIS, UNSPECIFIED TYPE: ICD-10-CM

## 2023-10-24 DIAGNOSIS — M25.511 ACUTE PAIN OF RIGHT SHOULDER: Primary | ICD-10-CM

## 2023-10-24 DIAGNOSIS — M25.531 ACUTE WRIST PAIN, RIGHT: ICD-10-CM

## 2023-10-24 DIAGNOSIS — E03.9 HYPOTHYROIDISM, UNSPECIFIED TYPE: ICD-10-CM

## 2023-10-24 LAB
BASOPHILS # BLD AUTO: 0.06 X10(3) UL (ref 0–0.2)
BASOPHILS NFR BLD AUTO: 0.7 %
CHOLEST SERPL-MCNC: 157 MG/DL (ref ?–200)
DEPRECATED RDW RBC AUTO: 43.2 FL (ref 35.1–46.3)
EOSINOPHIL # BLD AUTO: 0.23 X10(3) UL (ref 0–0.7)
EOSINOPHIL NFR BLD AUTO: 2.7 %
ERYTHROCYTE [DISTWIDTH] IN BLOOD BY AUTOMATED COUNT: 13.1 % (ref 11–15)
FASTING PATIENT LIPID ANSWER: YES
HCT VFR BLD AUTO: 42.1 %
HDLC SERPL-MCNC: 45 MG/DL (ref 40–59)
HGB BLD-MCNC: 14 G/DL
IMM GRANULOCYTES # BLD AUTO: 0.04 X10(3) UL (ref 0–1)
IMM GRANULOCYTES NFR BLD: 0.5 %
LDLC SERPL CALC-MCNC: 82 MG/DL (ref ?–100)
LYMPHOCYTES # BLD AUTO: 1.79 X10(3) UL (ref 1–4)
LYMPHOCYTES NFR BLD AUTO: 21.2 %
MCH RBC QN AUTO: 30.2 PG (ref 26–34)
MCHC RBC AUTO-ENTMCNC: 33.3 G/DL (ref 31–37)
MCV RBC AUTO: 90.9 FL
MONOCYTES # BLD AUTO: 0.45 X10(3) UL (ref 0.1–1)
MONOCYTES NFR BLD AUTO: 5.3 %
NEUTROPHILS # BLD AUTO: 5.89 X10 (3) UL (ref 1.5–7.7)
NEUTROPHILS # BLD AUTO: 5.89 X10(3) UL (ref 1.5–7.7)
NEUTROPHILS NFR BLD AUTO: 69.6 %
NONHDLC SERPL-MCNC: 112 MG/DL (ref ?–130)
PLATELET # BLD AUTO: 264 10(3)UL (ref 150–450)
RBC # BLD AUTO: 4.63 X10(6)UL
T4 FREE SERPL-MCNC: 1.1 NG/DL (ref 0.8–1.7)
TRIGL SERPL-MCNC: 178 MG/DL (ref 30–149)
TSI SER-ACNC: 4.01 MIU/ML (ref 0.36–3.74)
VLDLC SERPL CALC-MCNC: 28 MG/DL (ref 0–30)
WBC # BLD AUTO: 8.5 X10(3) UL (ref 4–11)

## 2023-10-24 PROCEDURE — 84443 ASSAY THYROID STIM HORMONE: CPT

## 2023-10-24 PROCEDURE — 80061 LIPID PANEL: CPT

## 2023-10-24 PROCEDURE — 99213 OFFICE O/P EST LOW 20 MIN: CPT | Performed by: INTERNAL MEDICINE

## 2023-10-24 PROCEDURE — 85025 COMPLETE CBC W/AUTO DIFF WBC: CPT

## 2023-10-24 PROCEDURE — 84439 ASSAY OF FREE THYROXINE: CPT

## 2023-10-24 PROCEDURE — 3074F SYST BP LT 130 MM HG: CPT | Performed by: INTERNAL MEDICINE

## 2023-10-24 PROCEDURE — 3079F DIAST BP 80-89 MM HG: CPT | Performed by: INTERNAL MEDICINE

## 2023-10-24 PROCEDURE — 3008F BODY MASS INDEX DOCD: CPT | Performed by: INTERNAL MEDICINE

## 2023-10-24 PROCEDURE — 36415 COLL VENOUS BLD VENIPUNCTURE: CPT

## 2023-10-25 DIAGNOSIS — E03.9 HYPOTHYROIDISM, UNSPECIFIED TYPE: Primary | ICD-10-CM

## 2023-10-25 RX ORDER — LEVOTHYROXINE SODIUM 137 UG/1
137 TABLET ORAL
Qty: 90 TABLET | Refills: 0 | Status: SHIPPED | OUTPATIENT
Start: 2023-10-25

## 2023-10-27 ENCOUNTER — TELEPHONE (OUTPATIENT)
Dept: INTERNAL MEDICINE CLINIC | Facility: CLINIC | Age: 68
End: 2023-10-27
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
## 2023-11-01 ENCOUNTER — OFFICE VISIT (OUTPATIENT)
Dept: OBGYN CLINIC | Facility: CLINIC | Age: 68
End: 2023-11-01

## 2023-11-01 VITALS
HEIGHT: 58.7 IN | BODY MASS INDEX: 46.38 KG/M2 | WEIGHT: 227 LBS | DIASTOLIC BLOOD PRESSURE: 81 MMHG | SYSTOLIC BLOOD PRESSURE: 173 MMHG | HEART RATE: 80 BPM

## 2023-11-01 DIAGNOSIS — N95.0 POSTMENOPAUSAL BLEEDING: Primary | ICD-10-CM

## 2023-11-01 PROCEDURE — 3008F BODY MASS INDEX DOCD: CPT | Performed by: OBSTETRICS & GYNECOLOGY

## 2023-11-01 PROCEDURE — 99202 OFFICE O/P NEW SF 15 MIN: CPT | Performed by: OBSTETRICS & GYNECOLOGY

## 2023-11-01 PROCEDURE — 3079F DIAST BP 80-89 MM HG: CPT | Performed by: OBSTETRICS & GYNECOLOGY

## 2023-11-01 PROCEDURE — 3077F SYST BP >= 140 MM HG: CPT | Performed by: OBSTETRICS & GYNECOLOGY
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
## 2023-11-14 ENCOUNTER — TELEPHONE (OUTPATIENT)
Dept: CASE MANAGEMENT | Age: 68
End: 2023-11-14

## 2023-11-14 ENCOUNTER — TELEPHONE (OUTPATIENT)
Dept: INTERNAL MEDICINE CLINIC | Facility: CLINIC | Age: 68
End: 2023-11-14

## 2023-11-14 DIAGNOSIS — E66.01 MORBID OBESITY WITH BMI OF 50.0-59.9, ADULT (HCC): Primary | ICD-10-CM

## 2023-11-14 NOTE — TELEPHONE ENCOUNTER
Dr. Ju Lawson,     Patient is requesting a referral to Dr. Kiesha Rider. Pended referral please review diagnosis and sign off if you agree. Thank you.   Geo Kc

## 2023-11-14 NOTE — TELEPHONE ENCOUNTER
Patient has referral for Dr. Sera Benedict but scheduled visit with Dr. Mary Street, she would like referral to be updated:    Future Appointments   Date Time Provider Toney Morin   11/20/2023  9:45 AM Rajendra Sauceda MD [facility] OF THE [facility]   11/27/2023 10:10 AM Lucina Garcia MD [facility] OF THE [facility]   12/1/2023  9:00 AM Jasmine Kessler MD [facility] OF THE [facility]   1/8/2024  9:20 AM TUTU MORAN [facility] [place]

## 2023-11-15 ENCOUNTER — TELEPHONE (OUTPATIENT)
Dept: INTERNAL MEDICINE CLINIC | Facility: CLINIC | Age: 68
End: 2023-11-15

## 2023-11-15 ENCOUNTER — OFFICE VISIT (OUTPATIENT)
Dept: INTERNAL MEDICINE CLINIC | Facility: CLINIC | Age: 68
End: 2023-11-15

## 2023-11-15 VITALS
DIASTOLIC BLOOD PRESSURE: 80 MMHG | TEMPERATURE: 98 F | BODY MASS INDEX: 45.69 KG/M2 | SYSTOLIC BLOOD PRESSURE: 134 MMHG | HEIGHT: 59 IN | WEIGHT: 226.63 LBS

## 2023-11-15 DIAGNOSIS — J06.9 ACUTE URI: Primary | ICD-10-CM

## 2023-11-15 PROCEDURE — 99213 OFFICE O/P EST LOW 20 MIN: CPT | Performed by: INTERNAL MEDICINE

## 2023-11-15 PROCEDURE — 3008F BODY MASS INDEX DOCD: CPT | Performed by: INTERNAL MEDICINE

## 2023-11-15 PROCEDURE — 3075F SYST BP GE 130 - 139MM HG: CPT | Performed by: INTERNAL MEDICINE

## 2023-11-15 PROCEDURE — 3079F DIAST BP 80-89 MM HG: CPT | Performed by: INTERNAL MEDICINE

## 2023-11-15 RX ORDER — DOXYCYCLINE HYCLATE 50 MG/1
50 CAPSULE ORAL 2 TIMES DAILY
Qty: 14 CAPSULE | Refills: 0 | Status: SHIPPED | OUTPATIENT
Start: 2023-11-15 | End: 2023-11-22

## 2023-11-15 RX ORDER — ROSUVASTATIN CALCIUM 20 MG/1
20 TABLET, COATED ORAL DAILY
Qty: 90 TABLET | Refills: 1 | Status: CANCELLED | OUTPATIENT
Start: 2023-11-15

## 2023-11-15 NOTE — TELEPHONE ENCOUNTER
Patient calling (identified name and ) to inform that her home Covid test was negative. Patient instructed to start Doxycycline. Patient verbalized understanding and agrees with plan. Per today's OV instructions:  ASSESSMENT AND PLAN:   1. Acute URI  Recommend home COVID test and asked that she contact us with results. Given underlying COPD, doxycycline 50 mg twice daily x7 days if COVID test negative. Prescription sent to pharmacy. Call if no better.

## 2023-11-15 NOTE — PATIENT INSTRUCTIONS
Please do a COVID test at home and contact us with results. If COVID test is negative, take doxycycline twice daily for 7 days.   Call if no better

## 2023-11-20 ENCOUNTER — OFFICE VISIT (OUTPATIENT)
Dept: SURGERY | Facility: CLINIC | Age: 68
End: 2023-11-20
Payer: COMMERCIAL

## 2023-11-20 VITALS
HEIGHT: 59 IN | BODY MASS INDEX: 45.22 KG/M2 | DIASTOLIC BLOOD PRESSURE: 84 MMHG | OXYGEN SATURATION: 97 % | HEART RATE: 90 BPM | WEIGHT: 224.31 LBS | SYSTOLIC BLOOD PRESSURE: 139 MMHG

## 2023-11-20 DIAGNOSIS — E11.9 TYPE 2 DIABETES MELLITUS WITHOUT RETINOPATHY (HCC): Primary | ICD-10-CM

## 2023-11-20 DIAGNOSIS — Z51.81 ENCOUNTER FOR THERAPEUTIC DRUG MONITORING: ICD-10-CM

## 2023-11-20 DIAGNOSIS — E66.01 MORBID OBESITY WITH BMI OF 45.0-49.9, ADULT (HCC): ICD-10-CM

## 2023-11-20 DIAGNOSIS — F43.9 STRESS: ICD-10-CM

## 2023-11-20 DIAGNOSIS — I10 ESSENTIAL HYPERTENSION: ICD-10-CM

## 2023-11-20 PROCEDURE — 3008F BODY MASS INDEX DOCD: CPT | Performed by: INTERNAL MEDICINE

## 2023-11-20 PROCEDURE — 3079F DIAST BP 80-89 MM HG: CPT | Performed by: INTERNAL MEDICINE

## 2023-11-20 PROCEDURE — 99214 OFFICE O/P EST MOD 30 MIN: CPT | Performed by: INTERNAL MEDICINE

## 2023-11-20 PROCEDURE — 3075F SYST BP GE 130 - 139MM HG: CPT | Performed by: INTERNAL MEDICINE

## 2023-11-20 RX ORDER — PHENTERMINE HYDROCHLORIDE 37.5 MG/1
37.5 TABLET ORAL
Qty: 90 TABLET | Refills: 1 | Status: SHIPPED | OUTPATIENT
Start: 2023-11-20 | End: 2024-02-18

## 2023-11-27 ENCOUNTER — TELEPHONE (OUTPATIENT)
Dept: ENDOCRINOLOGY CLINIC | Facility: CLINIC | Age: 68
End: 2023-11-27

## 2023-11-27 ENCOUNTER — OFFICE VISIT (OUTPATIENT)
Dept: ORTHOPEDICS CLINIC | Facility: CLINIC | Age: 68
End: 2023-11-27
Payer: COMMERCIAL

## 2023-11-27 VITALS — WEIGHT: 222 LBS | BODY MASS INDEX: 44.76 KG/M2 | HEIGHT: 59 IN

## 2023-11-27 DIAGNOSIS — M54.2 NECK PAIN: ICD-10-CM

## 2023-11-27 DIAGNOSIS — M67.911 DISORDER OF RIGHT ROTATOR CUFF: Primary | ICD-10-CM

## 2023-11-27 NOTE — TELEPHONE ENCOUNTER
Janina/Lab/[facility] called with questions about TSH lab orders. Pt is still there with her. Attempted to reach RN/no answer. Please call.

## 2023-11-27 NOTE — TELEPHONE ENCOUNTER
Dr. Lisette Roy,     Please advise if patient should get labs done    Last TSH: 10/24/23    Janina from lab asking if this was a duplicate order.

## 2023-11-29 ENCOUNTER — TELEPHONE (OUTPATIENT)
Dept: INTERNAL MEDICINE CLINIC | Facility: CLINIC | Age: 68
End: 2023-11-29

## 2023-12-02 ENCOUNTER — HOSPITAL ENCOUNTER (OUTPATIENT)
Dept: MRI IMAGING | Facility: HOSPITAL | Age: 68
Discharge: HOME | End: 2023-12-02
Attending: ORTHOPAEDIC SURGERY
Payer: COMMERCIAL

## 2023-12-02 DIAGNOSIS — M67.911 DISORDER OF RIGHT ROTATOR CUFF: ICD-10-CM

## 2023-12-02 PROCEDURE — 73221 MRI JOINT UPR EXTREM W/O DYE: CPT | Performed by: ORTHOPAEDIC SURGERY

## 2023-12-09 ENCOUNTER — TELEPHONE (OUTPATIENT)
Dept: ENDOCRINOLOGY CLINIC | Facility: CLINIC | Age: 68
End: 2023-12-09

## 2023-12-09 RX ORDER — TIRZEPATIDE 12.5 MG/.5ML
12.5 INJECTION, SOLUTION SUBCUTANEOUS WEEKLY
Qty: 2 ML | Refills: 0 | Status: SHIPPED | OUTPATIENT
Start: 2023-12-09

## 2023-12-09 NOTE — TELEPHONE ENCOUNTER
Please call and book Fu in the next one month   Can addd on before 2 pm / Tuesday end of the day   sepideh

## 2023-12-09 NOTE — TELEPHONE ENCOUNTER
Tirzepatide (MOUNJARO) 12.5 MG/0.5ML Subcutaneous Solution Pen-injector, Inject 12.5 mg into the skin once a week., Disp: 2 mL, Rfl: 0  Key:  [id number]

## 2023-12-09 NOTE — TELEPHONE ENCOUNTER
LOV: 8/21/23     Next office visit: No appt scheduled RTC 4-5      Last filled: 9/21/23  Refill request: Tirzepatide ( Mounjaro) 12. 5MG/0.5ML Subcutaneous Solution Pen- Injector     Order pended and routed to provider

## 2023-12-13 ENCOUNTER — TELEMEDICINE (OUTPATIENT)
Dept: INTERNAL MEDICINE CLINIC | Facility: CLINIC | Age: 68
End: 2023-12-13
Payer: COMMERCIAL

## 2023-12-13 DIAGNOSIS — R05.1 ACUTE COUGH: ICD-10-CM

## 2023-12-13 DIAGNOSIS — J06.9 VIRAL UPPER RESPIRATORY TRACT INFECTION: Primary | ICD-10-CM

## 2023-12-13 DIAGNOSIS — J44.1 ACUTE EXACERBATION OF CHRONIC OBSTRUCTIVE PULMONARY DISEASE (COPD) (HCC): ICD-10-CM

## 2023-12-13 DIAGNOSIS — R09.81 NASAL CONGESTION: ICD-10-CM

## 2023-12-13 PROCEDURE — 99214 OFFICE O/P EST MOD 30 MIN: CPT | Performed by: STUDENT IN AN ORGANIZED HEALTH CARE EDUCATION/TRAINING PROGRAM

## 2023-12-13 RX ORDER — DOXYCYCLINE HYCLATE 100 MG/1
100 CAPSULE ORAL 2 TIMES DAILY WITH MEALS
Qty: 14 CAPSULE | Refills: 0 | Status: SHIPPED | OUTPATIENT
Start: 2023-12-13 | End: 2023-12-20

## 2023-12-13 RX ORDER — AZELASTINE HYDROCHLORIDE 137 UG/1
1-2 SPRAY, METERED NASAL 2 TIMES DAILY
Qty: 30 ML | Refills: 3 | Status: SHIPPED | OUTPATIENT
Start: 2023-12-13

## 2023-12-13 RX ORDER — FLUTICASONE PROPIONATE 50 MCG
2 SPRAY, SUSPENSION (ML) NASAL DAILY
Qty: 3 EACH | Refills: 3 | Status: SHIPPED | OUTPATIENT
Start: 2023-12-13

## 2023-12-13 RX ORDER — BENZONATATE 200 MG/1
200 CAPSULE ORAL 3 TIMES DAILY PRN
Qty: 90 CAPSULE | Refills: 0 | Status: SHIPPED | OUTPATIENT
Start: 2023-12-13 | End: 2024-01-12

## 2023-12-13 RX ORDER — PREDNISONE 10 MG/1
TABLET ORAL
Qty: 30 TABLET | Refills: 0 | Status: SHIPPED | OUTPATIENT
Start: 2023-12-13 | End: 2023-12-24

## 2023-12-13 RX ORDER — CODEINE PHOSPHATE AND GUAIFENESIN 10; 100 MG/5ML; MG/5ML
10 SOLUTION ORAL EVERY 6 HOURS PRN
Qty: 60 ML | Refills: 0 | Status: SHIPPED | OUTPATIENT
Start: 2023-12-13 | End: 2023-12-20
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
## 2023-12-19 ENCOUNTER — LAB ENCOUNTER (OUTPATIENT)
Dept: LAB | Facility: HOSPITAL | Age: 68
End: 2023-12-19
Attending: INTERNAL MEDICINE
Payer: COMMERCIAL

## 2023-12-19 DIAGNOSIS — J06.9 VIRAL UPPER RESPIRATORY TRACT INFECTION: ICD-10-CM

## 2023-12-19 DIAGNOSIS — E03.9 HYPOTHYROIDISM, UNSPECIFIED TYPE: ICD-10-CM

## 2023-12-19 LAB
SARS-COV-2 RNA RESP QL NAA+PROBE: NOT DETECTED
TSI SER-ACNC: 2.37 MIU/ML (ref 0.55–4.78)

## 2023-12-19 PROCEDURE — 36415 COLL VENOUS BLD VENIPUNCTURE: CPT

## 2023-12-19 PROCEDURE — 84443 ASSAY THYROID STIM HORMONE: CPT

## 2023-12-19 NOTE — PROGRESS NOTES
Please notify patient she is covid negative, continue conservative treatment as discussed in clinic.

## 2023-12-20 ENCOUNTER — OFFICE VISIT (OUTPATIENT)
Dept: ENDOCRINOLOGY CLINIC | Facility: CLINIC | Age: 68
End: 2023-12-20

## 2023-12-20 VITALS
BODY MASS INDEX: 45.76 KG/M2 | HEART RATE: 85 BPM | WEIGHT: 227 LBS | HEIGHT: 59.02 IN | DIASTOLIC BLOOD PRESSURE: 82 MMHG | SYSTOLIC BLOOD PRESSURE: 157 MMHG

## 2023-12-20 DIAGNOSIS — E03.9 HYPOTHYROIDISM, UNSPECIFIED TYPE: Primary | ICD-10-CM

## 2023-12-20 DIAGNOSIS — E78.5 DYSLIPIDEMIA: ICD-10-CM

## 2023-12-20 DIAGNOSIS — E11.69 TYPE 2 DIABETES MELLITUS WITH OTHER SPECIFIED COMPLICATION, WITHOUT LONG-TERM CURRENT USE OF INSULIN (HCC): ICD-10-CM

## 2023-12-20 LAB
CARTRIDGE LOT#: ABNORMAL NUMERIC
GLUCOSE BLOOD: 108
HEMOGLOBIN A1C: 6 % (ref 4.3–5.6)
TEST STRIP LOT #: NORMAL NUMERIC

## 2023-12-20 PROCEDURE — 3008F BODY MASS INDEX DOCD: CPT | Performed by: INTERNAL MEDICINE

## 2023-12-20 PROCEDURE — 82947 ASSAY GLUCOSE BLOOD QUANT: CPT | Performed by: INTERNAL MEDICINE

## 2023-12-20 PROCEDURE — 3079F DIAST BP 80-89 MM HG: CPT | Performed by: INTERNAL MEDICINE

## 2023-12-20 PROCEDURE — 3077F SYST BP >= 140 MM HG: CPT | Performed by: INTERNAL MEDICINE

## 2023-12-20 PROCEDURE — 99213 OFFICE O/P EST LOW 20 MIN: CPT | Performed by: INTERNAL MEDICINE

## 2023-12-20 PROCEDURE — 83036 HEMOGLOBIN GLYCOSYLATED A1C: CPT | Performed by: INTERNAL MEDICINE

## 2023-12-20 NOTE — PROGRESS NOTES
Return Office Visit     CHIEF COMPLAINT:  Type 2 DM     Hypothyroidism    HISTORY OF PRESENT ILLNESS:  Anali Lopez is a 79year old female who presents for follow up for for Type 2 DM and hypothyroidism     DM HISTORY  Diagnosed:  2018     HISTORY OF DIABETES COMPLICATIONS: :  History of Retinopathy: No - last eye exam: 2023  History of Neuropathy: no  History of Nephropathy: no     ASSOCIATED COMPLICATIONS:   HTN: yes  Hyperlipidemia: yes  Coronary Artery Disease:  no  Cerebrovascular Disease: no        HOME GLUCOSE READINGS:   Checks sugars a few times week   100-130 on most occasions     No symptoms of hypoglycemia. CURRENT DIABETIC MEDICATIONS INCLUDE  Mounjaro 12.5  mg weekly          MEALS:  Dietary compliance with a low carb diet is moderate    EXERCISE:  No     HYPOTHYROIDISM:   She is on LT4 137 mcg PO daily  Reports compliance  Takes empty stomach       CURRENT MEDICATION:    Current Outpatient Medications   Medication Sig Dispense Refill    Tirzepatide (MOUNJARO) 12.5 MG/0.5ML Subcutaneous Solution Pen-injector Inject 12.5 mg into the skin once a week. 2 mL 0    levothyroxine 137 MCG Oral Tab Take 137 mcg by mouth before breakfast. 90 tablet 0    doxycycline 100 MG Oral Cap Take 1 capsule (100 mg total) by mouth 2 (two) times daily with meals for 7 days. 14 capsule 0    predniSONE 10 MG Oral Tab Take 4 tablets (40 mg total) by mouth daily for 3 days, THEN 3 tablets (30 mg total) daily for 3 days, THEN 2 tablets (20 mg total) daily for 3 days, THEN 1 tablet (10 mg total) daily for 3 days. TAKE WITH FOOD. . 30 tablet 0    benzonatate 200 MG Oral Cap Take 1 capsule (200 mg total) by mouth 3 (three) times daily as needed for cough (FOR COUGH). 90 capsule 0    guaiFENesin-codeine 100-10 MG/5ML Oral Solution Take 10 mL by mouth every 6 (six) hours as needed for cough. 60 mL 0    Azelastine HCl 137 MCG/SPRAY Nasal Solution 1-2 sprays by Nasal route in the morning and 1-2 sprays before bedtime.  FOR SINUS SYMPTOMS/NASAL CONGESTION. . 30 mL 3    fluticasone propionate 50 MCG/ACT Nasal Suspension 2 sprays by Each Nare route daily. FOR NASAL CONGESTION/SINUS SYMPTOMS. 3 each 3    Phentermine HCl 37.5 MG Oral Tab Take 1 tablet (37.5 mg total) by mouth every morning before breakfast. 90 tablet 1    metoprolol succinate ER 25 MG Oral Tablet 24 Hr Take 1 tablet (25 mg total) by mouth daily. 90 tablet 1    lisinopril-hydroCHLOROthiazide 20-25 MG Oral Tab Take 1 tablet by mouth daily. 90 tablet 1    rosuvastatin 20 MG Oral Tab Take 1 tablet (20 mg total) by mouth daily. 90 tablet 1    albuterol 108 (90 Base) MCG/ACT Inhalation Aero Soln Inhale 1 puff into the lungs every 6 (six) hours as needed for Wheezing. 1 each 0    Clobetasol Propionate (CLOBEX) 0.05 % External Shampoo Apply to scalp leave on 5-10mintues, lather, rinse out 3 times weekly as directed 120 mL 11    Budesonide-Formoterol Fumarate (SYMBICORT) 160-4.5 MCG/ACT Inhalation Aerosol Inhale 2 puffs into the lungs 2 (two) times daily. 1 each 5         ALLERGY:  No Known Allergies    PAST MEDICAL, SOCIAL AND FAMILY HISTORY:  See past medical history marked as reviewed. See past surgical history marked as reviewed. See past family history marked as reviewed. See past social history marked as reviewed.     ASSESSMENTS:     REVIEW OF SYSTEMS:  Constitutional: Negative for:  fever,  fatigue, cold/heat intolerance,  weight changes  Eyes: Negative for:  Visual changes, proptosis, blurring  ENT: Negative for:  dysphagia, neck swelling, dysphonia  Respiratory: Negative for:  dyspnea, cough  Cardiovascular: Negative for:  chest pain, palpitations, orthopnea  GI: Negative for:  abdominal pain, nausea, vomiting, diarrhea, constipation, bleeding  Neurology: Negative for: headache, numbness, weakness  Genito-Urinary: Negative for: dysuria, frequency  Psychiatric: Negative for:  depression, anxiety  Hematology/Lymphatics: Negative for: bruising, lower extremity edema  Endocrine: Negative for: polyuria, polydypsia  Skin: Negative for: rash, blister, cellulitis,       PHYSICAL EXAM:  Vitals reviewed    General Appearance:  alert, well developed, in no acute distress  Head: Atraumatic  Eyes:  normal conjunctivae, sclera. , normal sclera and normal pupils  Throat/Neck: normal sound to voice. Normal hearing, normal speech  Respiratory:  Speaking in full sentences, non-labored. no increased work of breathing, no audible wheezing    Neuro: motor grossly intact, moving all extremities without difficulty  Psychiatric:  oriented to time, self, and place  Extremities: 8/2023  Bilateral barefoot skin diabetic exam is normal, visualized feet and the appearance is normal.  Bilateral monofilament/sensation of both feet is normal.  Pulsation pedal pulse exam of both lower legs/feet is normal as well. ASSESSMENT AND PLAN:      1. Type 2 DM  A1c is 6.0 %  ( 12/2023)  -Reviewed the pathogenesis, natural course of diabetes. Patient understands the importance of glycemic control and the implications of uncontrolled diabetes including Diabetic ketoacidosis and various micro vascular and macrovascular complications. - Mounjaro  12.5 mg subcutaneous weekly  No personal or family history of MEN syndrome  Patient counselled regarding side effects including injection site reactions, nausea, vomiting, diarrhea, pancreatitis, gastroparesis and rare side effect sintia Patrick syndrome. Check and call with BG as discussed     Last Ur Ma normal     Up to date with eye exam    2. .Dyslipidemia  LDL is good now   C/w  rosuvastatin 20 mg daily   -Reviewed importance of low fat diet  Daily exercise  3. Hypothyroidism  TSH is normal  LT4 137 mcg daily   Reviewed compliance and admnistration    Patient verbalized a complete  understanding of all of the above and did not have any further questions.        RTC in 4-5 months

## 2023-12-27 ENCOUNTER — TELEPHONE (OUTPATIENT)
Dept: PHYSICAL THERAPY | Facility: HOSPITAL | Age: 68
End: 2023-12-27

## 2023-12-29 ENCOUNTER — OFFICE VISIT (OUTPATIENT)
Dept: PHYSICAL THERAPY | Age: 68
End: 2023-12-29
Attending: ORTHOPAEDIC SURGERY
Payer: COMMERCIAL

## 2023-12-29 ENCOUNTER — HOSPITAL ENCOUNTER (OUTPATIENT)
Dept: GENERAL RADIOLOGY | Age: 68
Discharge: HOME | End: 2023-12-29
Attending: PHYSICAL MEDICINE & REHABILITATION
Payer: COMMERCIAL

## 2023-12-29 ENCOUNTER — OFFICE VISIT (OUTPATIENT)
Dept: PHYSICAL MEDICINE AND REHAB | Facility: CLINIC | Age: 68
End: 2023-12-29
Payer: COMMERCIAL

## 2023-12-29 VITALS — HEIGHT: 59.02 IN | BODY MASS INDEX: 45.76 KG/M2 | WEIGHT: 227 LBS

## 2023-12-29 DIAGNOSIS — E11.9 TYPE 2 DIABETES MELLITUS WITHOUT RETINOPATHY (HCC): ICD-10-CM

## 2023-12-29 DIAGNOSIS — S16.1XXA STRAIN OF NECK MUSCLE, INITIAL ENCOUNTER: ICD-10-CM

## 2023-12-29 DIAGNOSIS — S16.1XXA STRAIN OF NECK MUSCLE, INITIAL ENCOUNTER: Primary | ICD-10-CM

## 2023-12-29 DIAGNOSIS — M75.41 SHOULDER IMPINGEMENT SYNDROME, RIGHT: ICD-10-CM

## 2023-12-29 DIAGNOSIS — M75.41 SHOULDER IMPINGEMENT SYNDROME, RIGHT: Primary | ICD-10-CM

## 2023-12-29 DIAGNOSIS — M54.2 CERVICALGIA: ICD-10-CM

## 2023-12-29 PROCEDURE — 72040 X-RAY EXAM NECK SPINE 2-3 VW: CPT | Performed by: PHYSICAL MEDICINE & REHABILITATION

## 2023-12-29 PROCEDURE — 97162 PT EVAL MOD COMPLEX 30 MIN: CPT

## 2023-12-29 PROCEDURE — 97530 THERAPEUTIC ACTIVITIES: CPT

## 2023-12-29 PROCEDURE — 97110 THERAPEUTIC EXERCISES: CPT

## 2023-12-29 PROCEDURE — 99214 OFFICE O/P EST MOD 30 MIN: CPT | Performed by: PHYSICAL MEDICINE & REHABILITATION

## 2023-12-29 PROCEDURE — 3008F BODY MASS INDEX DOCD: CPT | Performed by: PHYSICAL MEDICINE & REHABILITATION

## 2023-12-29 RX ORDER — MELOXICAM 15 MG/1
15 TABLET ORAL DAILY
Qty: 30 TABLET | Refills: 0 | Status: SHIPPED | OUTPATIENT
Start: 2023-12-29

## 2024-01-08 ENCOUNTER — HOSPITAL ENCOUNTER (OUTPATIENT)
Dept: MAMMOGRAPHY | Age: 69
Discharge: HOME | End: 2024-01-08
Attending: INTERNAL MEDICINE
Payer: COMMERCIAL

## 2024-01-08 ENCOUNTER — HOSPITAL ENCOUNTER (OUTPATIENT)
Dept: GENERAL RADIOLOGY | Facility: HOSPITAL | Age: 69
Discharge: HOME | End: 2024-01-08
Attending: ORTHOPAEDIC SURGERY
Payer: COMMERCIAL

## 2024-01-08 ENCOUNTER — OFFICE VISIT (OUTPATIENT)
Dept: ORTHOPEDICS CLINIC | Facility: CLINIC | Age: 69
End: 2024-01-08

## 2024-01-08 VITALS
DIASTOLIC BLOOD PRESSURE: 88 MMHG | HEART RATE: 85 BPM | HEIGHT: 59 IN | BODY MASS INDEX: 45.76 KG/M2 | SYSTOLIC BLOOD PRESSURE: 163 MMHG | WEIGHT: 227 LBS

## 2024-01-08 DIAGNOSIS — M67.911 DISORDER OF RIGHT ROTATOR CUFF: ICD-10-CM

## 2024-01-08 DIAGNOSIS — S42.214D CLOSED NONDISPLACED FRACTURE OF SURGICAL NECK OF RIGHT HUMERUS WITH ROUTINE HEALING, UNSPECIFIED FRACTURE MORPHOLOGY, SUBSEQUENT ENCOUNTER: ICD-10-CM

## 2024-01-08 DIAGNOSIS — Z47.89 ORTHOPEDIC AFTERCARE: Primary | ICD-10-CM

## 2024-01-08 DIAGNOSIS — Z12.31 ENCOUNTER FOR SCREENING MAMMOGRAM FOR MALIGNANT NEOPLASM OF BREAST: ICD-10-CM

## 2024-01-08 DIAGNOSIS — Z47.89 ORTHOPEDIC AFTERCARE: ICD-10-CM

## 2024-01-08 PROCEDURE — 77067 SCR MAMMO BI INCL CAD: CPT | Performed by: INTERNAL MEDICINE

## 2024-01-08 PROCEDURE — 73030 X-RAY EXAM OF SHOULDER: CPT | Performed by: ORTHOPAEDIC SURGERY

## 2024-01-08 PROCEDURE — 77063 BREAST TOMOSYNTHESIS BI: CPT | Performed by: INTERNAL MEDICINE

## 2024-01-08 RX ORDER — TRIAMCINOLONE ACETONIDE 40 MG/ML
40 INJECTION, SUSPENSION INTRA-ARTICULAR; INTRAMUSCULAR ONCE
Status: DISCONTINUED | OUTPATIENT
Start: 2024-01-08 | End: 2024-01-08

## 2024-01-08 RX ORDER — TRIAMCINOLONE ACETONIDE 40 MG/ML
40 INJECTION, SUSPENSION INTRA-ARTICULAR; INTRAMUSCULAR ONCE
Status: COMPLETED | OUTPATIENT
Start: 2024-01-08 | End: 2024-01-08

## 2024-01-08 RX ADMIN — TRIAMCINOLONE ACETONIDE 40 MG: 40 INJECTION, SUSPENSION INTRA-ARTICULAR; INTRAMUSCULAR at 16:52:00

## 2024-01-08 NOTE — PROGRESS NOTES
Per verbal order from Dr. Garcia, draw up and 4ml of 0.5% Marcaine and 1ml of Kenalog 40 for injection into right shoulder.Lizeth Pacheco  Patient provided education handout for cortisone injection.
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
## 2024-01-12 ENCOUNTER — APPOINTMENT (OUTPATIENT)
Dept: PHYSICAL THERAPY | Age: 69
End: 2024-01-12
Attending: ORTHOPAEDIC SURGERY
Payer: COMMERCIAL

## 2024-01-19 ENCOUNTER — OFFICE VISIT (OUTPATIENT)
Dept: PHYSICAL THERAPY | Age: 69
End: 2024-01-19
Attending: ORTHOPAEDIC SURGERY
Payer: COMMERCIAL

## 2024-01-19 DIAGNOSIS — M75.41 SHOULDER IMPINGEMENT SYNDROME, RIGHT: Primary | ICD-10-CM

## 2024-01-19 PROCEDURE — 97112 NEUROMUSCULAR REEDUCATION: CPT

## 2024-01-19 PROCEDURE — 97140 MANUAL THERAPY 1/> REGIONS: CPT

## 2024-01-19 PROCEDURE — 97110 THERAPEUTIC EXERCISES: CPT

## 2024-01-19 NOTE — PROGRESS NOTES
Diagnosis:   Shoulder impingement syndrome, right       Referring Provider: Radha  Date of Evaluation:    12/29/23    Precautions:  HTN, High Cholesterol  and Hyperthyroid.  Next MD visit:   none scheduled  Date of Surgery: n/a   Insurance Primary/Secondary: [employer] [place]MO / N/A     # Auth Visits: 12            Subjective: Patient reports continued right shoulder pain this afternoon. She has been doing her HEP and felt ok after her initial eval.    Pain: 5/10      Objective:   Observation/Posture: Right depressed shoulder, thoracic kyphosis and forward head.  Palpation: Pain at the right shoulder region.  Sensation: Rigth forearm and finger numbness.  Cervical Screen: Decreased crom in all directions.     AROM: (* denotes performed with pain)  Shoulder    Flexion: R 153; L 180  Abduction: R 157; L 180  ER: R 67; L 90  IR: R 80; L 80      Accessory motion: Right shoulder hypomobility; +2 grades in all directions.     Flexibility: Right shoulder tightness.     Strength/MMT: (* denotes performed with pain)  Shoulder Elbow Scapular   Flexion: R 4/5; L 5/5  Abduction: R 4/5; L 5/5  ER: R 4/5; L 5/5  IR: R 4/5; L 5/5 Flexion: R 4/5; L 5/5  Extension: R 4/5; L 5/5  /5  Rhomboids: R4/5, L 5/5  Mid trap: R 4/5; L 5/5   Lats: R 4/5, L 5/5  Low trap: R 4/5; L 5/5      Special tests:   Positive right granger-janel and empty can tests. She was negative at the left and bilaterally with neer's and speed's tests.      Assessment: Patient presents some pain and tenderness at the right shoulder during her stm and prom. The patient with right shoulder weakness during her strengthening exercises.      Goals: (to be met in 12 visits)   .Pt will report improved ability to sleep without waking due to shoulder pain   Pt will improve shoulder flexion AROM to >180 degrees to be able to reach into overhead cabinets without pain or restriction   Pt will improve shoulder abduction AROM to >180 degrees to improve ability to don deodorant,  don/doff shirts, and wash hair   Pt will increase shoulder AROM ER to 90 to be able to reach and fasten seatbelt   Pt will improve shoulder strength throughout to 5/5 to improve function with right ue use   Pt will demonstrate increased mid/low trap strength to 5/5 to promote improved shoulder mechanics and stabilization with lifting and reaching   Pt will be independent and compliant with comprehensive HEP to maintain progress achieved in PT      Plan: Plan to work on rom, strengthening and posture re-ed.  Date: 1/19/2024  TX#: 2/12 Date:                 TX#: 3/ Date:                 TX#: 4/ Date:                 TX#: 5/ Date:   Tx#: 6/   Ther. Ex.: 22'  UBE 4'/4' L1  PROM at the right shoulder  Cane Supine Flex. 20x  Shoulder Shrugs 2x20  Scap. Ret. 20x3\"  Wall Walks Flex. And Abd. 10xea.       Manual: 8'  STM at the right shoulder region       Neuro Re-ed.: 15'  Bicep Curls 20x  Cane Chest Press 20x       HEP: Reviewed her original HEP.    Charges: 1TE, 1Neuro and 1MT       Total Timed Treatment: 45 min  Total Treatment Time: 45 min

## 2024-01-22 RX ORDER — LEVOTHYROXINE SODIUM 137 UG/1
137 TABLET ORAL
Qty: 90 TABLET | Refills: 0 | Status: SHIPPED | OUTPATIENT
Start: 2024-01-22

## 2024-01-22 RX ORDER — TIRZEPATIDE 12.5 MG/.5ML
12.5 INJECTION, SOLUTION SUBCUTANEOUS WEEKLY
Qty: 2 ML | Refills: 0 | Status: SHIPPED | OUTPATIENT
Start: 2024-01-22

## 2024-01-22 NOTE — TELEPHONE ENCOUNTER
LOV: 12/20/23    RTC: 4-5 Months    FU: 4/22/24    Called and spoke to patient, appointment booked for 4/22/24.

## 2024-01-26 ENCOUNTER — OFFICE VISIT (OUTPATIENT)
Dept: PHYSICAL THERAPY | Age: 69
End: 2024-01-26
Attending: ORTHOPAEDIC SURGERY
Payer: COMMERCIAL

## 2024-01-26 DIAGNOSIS — M75.41 SHOULDER IMPINGEMENT SYNDROME, RIGHT: Primary | ICD-10-CM

## 2024-01-26 PROCEDURE — 97140 MANUAL THERAPY 1/> REGIONS: CPT

## 2024-01-26 PROCEDURE — 97112 NEUROMUSCULAR REEDUCATION: CPT

## 2024-01-26 PROCEDURE — 97110 THERAPEUTIC EXERCISES: CPT

## 2024-01-26 NOTE — PROGRESS NOTES
Diagnosis:   Shoulder impingement syndrome, right       Referring Provider: Radha  Date of Evaluation:    12/29/23    Precautions:  HTN, High Cholesterol  and Hyperthyroid.  Next MD visit:   none scheduled  Date of Surgery: n/a   Insurance Primary/Secondary: [employer] [place] / N/A     # Auth Visits: 12            Subjective: Patient reports decreased right shoulder pain this afternoon. She has been doing her HEP and felt ok after her last visit.    Pain: 2-3/10      Objective:   Observation/Posture: Right depressed shoulder, thoracic kyphosis and forward head.  Palpation: Pain at the right shoulder region.  Sensation: Rigth forearm and finger numbness.  Cervical Screen: Decreased crom in all directions.     AROM: (* denotes performed with pain)  Shoulder    Flexion: R 153; L 180  Abduction: R 157; L 180  ER: R 67; L 90  IR: R 80; L 80      Accessory motion: Right shoulder hypomobility; +2 grades in all directions.     Flexibility: Right shoulder tightness.     Strength/MMT: (* denotes performed with pain)  Shoulder Elbow Scapular   Flexion: R 4/5; L 5/5  Abduction: R 4/5; L 5/5  ER: R 4/5; L 5/5  IR: R 4/5; L 5/5 Flexion: R 4/5; L 5/5  Extension: R 4/5; L 5/5  /5  Rhomboids: R4/5, L 5/5  Mid trap: R 4/5; L 5/5   Lats: R 4/5, L 5/5  Low trap: R 4/5; L 5/5      Special tests:   Positive right granger-janel and empty can tests. She was negative at the left and bilaterally with neer's and speed's tests.      Assessment: Patient presents some pain and tenderness at the right shoulder and biceps during her stm and prom. The patient with right shoulder weakness during her strengthening and posture re-ed. exercises.      Goals: (to be met in 12 visits)   .Pt will report improved ability to sleep without waking due to shoulder pain   Pt will improve shoulder flexion AROM to >180 degrees to be able to reach into overhead cabinets without pain or restriction   Pt will improve shoulder abduction AROM to >180 degrees to improve  ability to don deodorant, don/doff shirts, and wash hair   Pt will increase shoulder AROM ER to 90 to be able to reach and fasten seatbelt   Pt will improve shoulder strength throughout to 5/5 to improve function with right ue use   Pt will demonstrate increased mid/low trap strength to 5/5 to promote improved shoulder mechanics and stabilization with lifting and reaching   Pt will be independent and compliant with comprehensive HEP to maintain progress achieved in PT      Plan: Plan to work on rom, strengthening and posture re-ed.  Date: 1/19/2024  TX#: 2/12 Date:  1/26/24               TX#: 3/12 Date:                 TX#: 4/ Date:                 TX#: 5/ Date:   Tx#: 6/   Ther. Ex.: 22'  UBE 4'/4' L1  PROM at the right shoulder  Cane Supine Flex. 20x  Shoulder Shrugs 2x20  Scap. Ret. 20x3\"  Wall Walks Flex. And Abd. 10xea. Ther. Ex.: 22'  UBE 4'/4' L1  PROM at the right shoulder  Cane Supine Flex. 20x  Shoulder Shrugs 2x20  Scap. Ret. 20x3\"  Wall Walks Flex. And Abd. 10xea.  Doorway St. 4x30\"  Right Bicep St. 3x30\"      Manual: 8'  STM at the right shoulder region Manual: 8'  STM at the right shoulder region      Neuro Re-ed.: 15'  Bicep Curls 20x  Cane Chest Press 20x Neuro Re-ed.: 15'  Cane Bicep Curls 20x  Cane Chest Press 20x       Modalities:   Ice at the right shoulder for 10' after her treatment unbilled.      HEP: Added stretching to her HEP.    Charges: 1TE, 1Neuro and 1MT       Total Timed Treatment: 45 min  Total Treatment Time: 45 min

## 2024-02-02 ENCOUNTER — TELEPHONE (OUTPATIENT)
Dept: ENDOCRINOLOGY CLINIC | Facility: CLINIC | Age: 69
End: 2024-02-02

## 2024-02-02 ENCOUNTER — APPOINTMENT (OUTPATIENT)
Dept: PHYSICAL THERAPY | Age: 69
End: 2024-02-02
Attending: ORTHOPAEDIC SURGERY
Payer: COMMERCIAL

## 2024-02-02 NOTE — TELEPHONE ENCOUNTER
Spoke to pharmacist - he stated he needs to put order in with DX code to override system - DX code: E11.69 given to pharmacist  Pharmacist stated they will contact patient when ready for pickup

## 2024-02-02 NOTE — TELEPHONE ENCOUNTER
Drug change request    Plan does not cover medication prescribed.  Per RX benefit plan alternative medications include:  Please fax back with approval along with strength,directions,quantity,and refills..    Current Outpatient Medications   Medication Sig Dispense Refill           Tirzepatide (MOUNJARO) 12.5 MG/0.5ML Subcutaneous Solution Pen-injector Inject 12.5 mg into the skin once a week. 2 mL 0

## 2024-02-07 ENCOUNTER — TELEPHONE (OUTPATIENT)
Dept: INTERNAL MEDICINE CLINIC | Facility: CLINIC | Age: 69
End: 2024-02-07

## 2024-02-07 NOTE — TELEPHONE ENCOUNTER
Patient scheduled an appt via [facility] for the following concern:    Numbness on 3 fingers of R hand need a referral to a nerve specialist…  Also one for dermatology

## 2024-02-15 ENCOUNTER — OFFICE VISIT (OUTPATIENT)
Dept: PHYSICAL THERAPY | Age: 69
End: 2024-02-15
Attending: ORTHOPAEDIC SURGERY
Payer: COMMERCIAL

## 2024-02-15 DIAGNOSIS — M75.41 SHOULDER IMPINGEMENT SYNDROME, RIGHT: Primary | ICD-10-CM

## 2024-02-15 PROCEDURE — 97140 MANUAL THERAPY 1/> REGIONS: CPT

## 2024-02-15 PROCEDURE — 97112 NEUROMUSCULAR REEDUCATION: CPT

## 2024-02-15 PROCEDURE — 97110 THERAPEUTIC EXERCISES: CPT

## 2024-02-15 NOTE — PROGRESS NOTES
Diagnosis:   Shoulder impingement syndrome, right       Referring Provider: Radha  Date of Evaluation:    12/29/23    Precautions:  HTN, High Cholesterol  and Hyperthyroid.  Next MD visit:   none scheduled  Date of Surgery: n/a   Insurance Primary/Secondary: [employer] [place] HMO / N/A     # Auth Visits: 12            Subjective: Patient reports continued right shoulder pain this afternoon. She has been doing her HEP and felt ok after her last visit.    Pain: 2-3/10      Objective:   Observation/Posture: Right depressed shoulder, thoracic kyphosis and forward head.  Palpation: Pain at the right shoulder region.  Sensation: Rigth forearm and finger numbness.  Cervical Screen: Decreased crom in all directions.     AROM: (* denotes performed with pain)  Shoulder    Flexion: R 153; L 180  Abduction: R 157; L 180  ER: R 67; L 90  IR: R 80; L 80      Accessory motion: Right shoulder hypomobility; +2 grades in all directions.     Flexibility: Right shoulder tightness.     Strength/MMT: (* denotes performed with pain)  Shoulder Elbow Scapular   Flexion: R 4/5; L 5/5  Abduction: R 4/5; L 5/5  ER: R 4/5; L 5/5  IR: R 4/5; L 5/5 Flexion: R 4/5; L 5/5  Extension: R 4/5; L 5/5  /5  Rhomboids: R4/5, L 5/5  Mid trap: R 4/5; L 5/5   Lats: R 4/5, L 5/5  Low trap: R 4/5; L 5/5      Special tests:   Positive right granger-janel and empty can tests. She was negative at the left and bilaterally with neer's and speed's tests.      Assessment: Patient presents some pain and tenderness at the right cervical region during her stm and prom. The patient presents with right shoulder weakness during her strengthening and posture re-ed. exercises.      Goals: (to be met in 12 visits)   .Pt will report improved ability to sleep without waking due to shoulder pain   Pt will improve shoulder flexion AROM to >180 degrees to be able to reach into overhead cabinets without pain or restriction   Pt will improve shoulder abduction AROM to >180 degrees to  improve ability to don deodorant, don/doff shirts, and wash hair   Pt will increase shoulder AROM ER to 90 to be able to reach and fasten seatbelt   Pt will improve shoulder strength throughout to 5/5 to improve function with right ue use   Pt will demonstrate increased mid/low trap strength to 5/5 to promote improved shoulder mechanics and stabilization with lifting and reaching   Pt will be independent and compliant with comprehensive HEP to maintain progress achieved in PT      Plan: Plan to work on rom, strengthening and posture re-ed.  Date: 1/19/2024  TX#: 2/12 Date:  1/26/24               TX#: 3/12 Date:  2/15/24               TX#: 4/12 Date:                 TX#: 5/ Date:   Tx#: 6/   Ther. Ex.: 22'  UBE 4'/4' L1  PROM at the right shoulder  Cane Supine Flex. 20x  Shoulder Shrugs 2x20  Scap. Ret. 20x3\"  Wall Walks Flex. And Abd. 10xea. Ther. Ex.: 22'  UBE 4'/4' L1  PROM at the right shoulder  Cane Supine Flex. 20x  Shoulder Shrugs 2x20  Scap. Ret. 20x3\"  Wall Walks Flex. And Abd. 10xea.  Doorway St. 4x30\"  Right Bicep St. 3x30\" Ther. Ex.: 22'  UBE 4'/4' L1  Cane Flexion 20x  Shoulder Shrugs 2x20  Scap. Ret. 20x3\"  Wall Walks Flex. And Abd. 10xea.  Doorway St. 4x30\"  Right trap., lev. And scalene st. 2x30\"xea.     Manual: 8'  STM at the right shoulder region Manual: 8'  STM at the right shoulder region Manual: 8'  STM at the right shoulder region     Neuro Re-ed.: 15'  Bicep Curls 20x  Cane Chest Press 20x Neuro Re-ed.: 15'  Cane Bicep Curls 20x  Cane Chest Press 20x Neuro Re-ed.: 15'  Cane Bicep Curls 20x  Cane Chest Press 20x      Modalities:   Ice at the right shoulder for 10' after her treatment unbilled. Modalities:   heat at the right cervical region for 10' after her treatment unbilled.     HEP: Did not add any new exercises to her HEP.    Charges: 1TE, 1Neuro and 1MT       Total Timed Treatment: 45 min  Total Treatment Time: 45 min

## 2024-02-19 ENCOUNTER — PATIENT MESSAGE (OUTPATIENT)
Dept: ENDOCRINOLOGY CLINIC | Facility: CLINIC | Age: 69
End: 2024-02-19

## 2024-02-19 ENCOUNTER — HOSPITAL ENCOUNTER (OUTPATIENT)
Dept: GENERAL RADIOLOGY | Age: 69
Discharge: HOME | End: 2024-02-19
Attending: INTERNAL MEDICINE
Payer: COMMERCIAL

## 2024-02-19 ENCOUNTER — OFFICE VISIT (OUTPATIENT)
Dept: INTERNAL MEDICINE CLINIC | Facility: CLINIC | Age: 69
End: 2024-02-19

## 2024-02-19 VITALS
DIASTOLIC BLOOD PRESSURE: 76 MMHG | HEIGHT: 59 IN | WEIGHT: 222 LBS | SYSTOLIC BLOOD PRESSURE: 124 MMHG | OXYGEN SATURATION: 96 % | TEMPERATURE: 99 F | BODY MASS INDEX: 44.76 KG/M2 | HEART RATE: 89 BPM

## 2024-02-19 DIAGNOSIS — L21.9 SEBORRHEIC DERMATITIS: ICD-10-CM

## 2024-02-19 DIAGNOSIS — M89.8X6 TIBIAL PAIN: ICD-10-CM

## 2024-02-19 DIAGNOSIS — I83.93 VARICOSE VEINS OF BOTH LOWER EXTREMITIES, UNSPECIFIED WHETHER COMPLICATED: ICD-10-CM

## 2024-02-19 DIAGNOSIS — R20.2 PARESTHESIA OF FINGER: Primary | ICD-10-CM

## 2024-02-19 PROCEDURE — 73590 X-RAY EXAM OF LOWER LEG: CPT | Performed by: INTERNAL MEDICINE

## 2024-02-19 PROCEDURE — 3008F BODY MASS INDEX DOCD: CPT | Performed by: INTERNAL MEDICINE

## 2024-02-19 PROCEDURE — 99214 OFFICE O/P EST MOD 30 MIN: CPT | Performed by: INTERNAL MEDICINE

## 2024-02-19 PROCEDURE — 3078F DIAST BP <80 MM HG: CPT | Performed by: INTERNAL MEDICINE

## 2024-02-19 PROCEDURE — 3074F SYST BP LT 130 MM HG: CPT | Performed by: INTERNAL MEDICINE
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
## 2024-02-20 RX ORDER — TIRZEPATIDE 12.5 MG/.5ML
12.5 INJECTION, SOLUTION SUBCUTANEOUS WEEKLY
Qty: 6 ML | Refills: 0 | Status: SHIPPED | OUTPATIENT
Start: 2024-02-20

## 2024-02-20 NOTE — TELEPHONE ENCOUNTER
From: Jing Irving  To: Georgette Rodriguez  Sent: 2/19/2024 5:04 PM CST  Subject: Jc    Eliot calzada [facility] , [facility] etc  It seems that frequently munjaro is out of stock currently I am again with out ...  I was wondering if you would be able to prescribe a 3m supply of 12,5mg instead of one month...as it seems I can't find it near by this month we were told that [facility] in [facility] had 4 boxes ,and although we explained to them we were coming from far away and can pls hold it till we make it thete, by the time we made it there there were all sold out ..  So if possible I would truly appreciate if its allowed of course to get a 3 month prescription...   Thank you so much   Jing justin

## 2024-02-22 ENCOUNTER — TELEPHONE (OUTPATIENT)
Dept: INTERNAL MEDICINE CLINIC | Facility: CLINIC | Age: 69
End: 2024-02-22

## 2024-02-22 DIAGNOSIS — M89.8X6 TIBIAL PAIN: Primary | ICD-10-CM

## 2024-03-04 ENCOUNTER — TELEPHONE (OUTPATIENT)
Dept: INTERNAL MEDICINE CLINIC | Facility: CLINIC | Age: 69
End: 2024-03-04

## 2024-03-04 NOTE — TELEPHONE ENCOUNTER
Patient calling to request a referral for Order # [id number]. Patient does not have additional information regarding specialist or Dr. Name she is to schedule with. Patient called Central scheduling and was advised she needs a referral/name of Doctor she is scheduling for the below:     \  (R20.2) Paresthesia of finger  (primary encounter diagnosis)  Plan: EMG (At [facility])        Suspect carpal tunnel syndrome, will get emg. For now trial of wrist splint.

## 2024-03-12 ENCOUNTER — OFFICE VISIT (OUTPATIENT)
Dept: PHYSICAL THERAPY | Age: 69
End: 2024-03-12
Attending: ORTHOPAEDIC SURGERY
Payer: COMMERCIAL

## 2024-03-12 DIAGNOSIS — M75.41 SHOULDER IMPINGEMENT SYNDROME, RIGHT: Primary | ICD-10-CM

## 2024-03-12 PROCEDURE — 97110 THERAPEUTIC EXERCISES: CPT

## 2024-03-12 PROCEDURE — 97140 MANUAL THERAPY 1/> REGIONS: CPT

## 2024-03-12 PROCEDURE — 97112 NEUROMUSCULAR REEDUCATION: CPT

## 2024-03-12 NOTE — PROGRESS NOTES
Diagnosis:   Shoulder impingement syndrome, right       Referring Provider: Radha  Date of Evaluation:    12/29/23    Precautions:  HTN, High Cholesterol  and Hyperthyroid.  Next MD visit:   none scheduled  Date of Surgery: n/a   Insurance Primary/Secondary: [employer] [place] HMO / N/A     # Auth Visits: 12            Subjective: Patient reports continued right shoulder pain this evening. She has been doing her HEP and felt ok after her last visit.    Pain: 3/10      Objective:   Observation/Posture: Right depressed shoulder, thoracic kyphosis and forward head.  Palpation: Pain at the right shoulder region.  Sensation: Rigth forearm and finger numbness.  Cervical Screen: Decreased crom in all directions.     AROM: (* denotes performed with pain)  Shoulder    Flexion: R 153; L 180  Abduction: R 157; L 180  ER: R 67; L 90  IR: R 80; L 80      Accessory motion: Right shoulder hypomobility; +2 grades in all directions.     Flexibility: Right shoulder tightness.     Strength/MMT: (* denotes performed with pain)  Shoulder Elbow Scapular   Flexion: R 4/5; L 5/5  Abduction: R 4/5; L 5/5  ER: R 4/5; L 5/5  IR: R 4/5; L 5/5 Flexion: R 4/5; L 5/5  Extension: R 4/5; L 5/5  /5  Rhomboids: R4/5, L 5/5  Mid trap: R 4/5; L 5/5   Lats: R 4/5, L 5/5  Low trap: R 4/5; L 5/5      Special tests:   Positive right granger-janel and empty can tests. She was negative at the left and bilaterally with neer's and speed's tests.      Assessment: Patient presents some pain and tenderness at the right cervical and shoulder region during her stm and prom. The patient also presents with right shoulder weakness during her strengthening and posture re-ed. exercises.      Goals: (to be met in 12 visits)   .Pt will report improved ability to sleep without waking due to shoulder pain   Pt will improve shoulder flexion AROM to >180 degrees to be able to reach into overhead cabinets without pain or restriction   Pt will improve shoulder abduction AROM to >180  degrees to improve ability to don deodorant, don/doff shirts, and wash hair   Pt will increase shoulder AROM ER to 90 to be able to reach and fasten seatbelt   Pt will improve shoulder strength throughout to 5/5 to improve function with right ue use   Pt will demonstrate increased mid/low trap strength to 5/5 to promote improved shoulder mechanics and stabilization with lifting and reaching   Pt will be independent and compliant with comprehensive HEP to maintain progress achieved in PT      Plan: Plan to work on rom, strengthening and posture re-ed.  Date: 1/19/2024  TX#: 2/12 Date:  1/26/24               TX#: 3/12 Date:  2/15/24               TX#: 4/12 Date:  3/12/24               TX#: 5/12 Date:   Tx#: 6/   Ther. Ex.: 22'  UBE 4'/4' L1  PROM at the right shoulder  Cane Supine Flex. 20x  Shoulder Shrugs 2x20  Scap. Ret. 20x3\"  Wall Walks Flex. And Abd. 10xea. Ther. Ex.: 22'  UBE 4'/4' L1  PROM at the right shoulder  Cane Supine Flex. 20x  Shoulder Shrugs 2x20  Scap. Ret. 20x3\"  Wall Walks Flex. And Abd. 10xea.  Doorway St. 4x30\"  Right Bicep St. 3x30\" Ther. Ex.: 22'  UBE 4'/4' L1  Cane Flexion 20x  Shoulder Shrugs 2x20  Scap. Ret. 20x3\"  Wall Walks Flex. And Abd. 10xea.  Doorway St. 4x30\"  Right trap., lev. And scalene st. 2x30\"xea. Ther. Ex.: 27'  UBE 4'/4' L1  Cane Flexion 20x  Shoulder Shrugs 2x20  Scap. Ret. 20x3\"  Wall Walks Flex. And Abd. 10xea.  Doorway St. 4x30\"  Right trap., lev. And scalene st. 2x30\"xea.  Cane IR St. 20x3\"    Manual: 8'  STM at the right shoulder region Manual: 8'  STM at the right shoulder region Manual: 8'  STM at the right shoulder region Manual: 8'  STM at the right shoulder region    Neuro Re-ed.: 15'  Bicep Curls 20x  Cane Chest Press 20x Neuro Re-ed.: 15'  Cane Bicep Curls 20x  Cane Chest Press 20x Neuro Re-ed.: 15'  Cane Bicep Curls 20x  Cane Chest Press 20x Neuro Re-ed.: 10'  Cane Bicep Curls 20x  Cane Chest Press 20x     Modalities:   Ice at the right shoulder for 10' after her  treatment unbilled. Modalities:   heat at the right cervical region for 10' after her treatment unbilled.     HEP: Did not add any new exercises to her HEP.    Charges: 2TE, 1Neuro and 1MT       Total Timed Treatment: 45 min  Total Treatment Time: 45 min

## 2024-03-14 ENCOUNTER — TELEPHONE (OUTPATIENT)
Dept: CASE MANAGEMENT | Age: 69
End: 2024-03-14

## 2024-03-14 DIAGNOSIS — I83.93 VARICOSE VEINS OF BOTH LOWER EXTREMITIES, UNSPECIFIED WHETHER COMPLICATED: Primary | ICD-10-CM

## 2024-03-14 NOTE — TELEPHONE ENCOUNTER
Hello     I have received a call from Rita at [facility]. Rita notes that patient has appointment on 3.27.24. She notes that she has appointment Dr. Garcia . Please appropriate referral.    Patient left message stating that she needs a referral for Dr. Mcdonald.     Please reach out to clarify referral needs.     Thank you,    Severy  Referral specialist

## 2024-03-14 NOTE — TELEPHONE ENCOUNTER
Spoke with pt, verified , pt states that she needs a referral for Dr Tom Walker. Referral placed, pt has an appt on 3/27/24. Informed pt that referral will be sent to [facility] in order for authorization to be obtained, pt verbalized understanding.

## 2024-03-15 NOTE — TELEPHONE ENCOUNTER
Spoke with Rita at .   Patient will be seeing NP Micki Garcia under Dr. Singletary.   See Referral [id number]

## 2024-03-18 ENCOUNTER — OFFICE VISIT (OUTPATIENT)
Dept: INTERNAL MEDICINE CLINIC | Facility: CLINIC | Age: 69
End: 2024-03-18

## 2024-03-18 VITALS
WEIGHT: 222 LBS | TEMPERATURE: 98 F | HEART RATE: 89 BPM | DIASTOLIC BLOOD PRESSURE: 83 MMHG | SYSTOLIC BLOOD PRESSURE: 150 MMHG | HEIGHT: 59 IN | OXYGEN SATURATION: 98 % | BODY MASS INDEX: 44.76 KG/M2

## 2024-03-18 DIAGNOSIS — B02.8 HERPES ZOSTER WITH COMPLICATION: Primary | ICD-10-CM

## 2024-03-18 PROCEDURE — 3008F BODY MASS INDEX DOCD: CPT | Performed by: STUDENT IN AN ORGANIZED HEALTH CARE EDUCATION/TRAINING PROGRAM

## 2024-03-18 PROCEDURE — 3079F DIAST BP 80-89 MM HG: CPT | Performed by: STUDENT IN AN ORGANIZED HEALTH CARE EDUCATION/TRAINING PROGRAM

## 2024-03-18 PROCEDURE — 3077F SYST BP >= 140 MM HG: CPT | Performed by: STUDENT IN AN ORGANIZED HEALTH CARE EDUCATION/TRAINING PROGRAM

## 2024-03-18 PROCEDURE — 99214 OFFICE O/P EST MOD 30 MIN: CPT | Performed by: STUDENT IN AN ORGANIZED HEALTH CARE EDUCATION/TRAINING PROGRAM

## 2024-03-18 RX ORDER — GABAPENTIN 300 MG/1
300 CAPSULE ORAL NIGHTLY PRN
Qty: 30 CAPSULE | Refills: 0 | Status: SHIPPED | OUTPATIENT
Start: 2024-03-18 | End: 2024-04-17

## 2024-03-18 RX ORDER — VALACYCLOVIR HYDROCHLORIDE 1 G/1
1000 TABLET, FILM COATED ORAL EVERY 8 HOURS
Qty: 21 TABLET | Refills: 0 | Status: SHIPPED | OUTPATIENT
Start: 2024-03-18 | End: 2024-03-25

## 2024-03-18 RX ORDER — PREDNISONE 20 MG/1
60 TABLET ORAL DAILY
Qty: 21 TABLET | Refills: 0 | Status: SHIPPED | OUTPATIENT
Start: 2024-03-18 | End: 2024-03-25

## 2024-03-18 RX ORDER — TRIAMCINOLONE ACETONIDE 5 MG/G
1 CREAM TOPICAL 2 TIMES DAILY
Qty: 15 G | Refills: 1 | Status: SHIPPED | OUTPATIENT
Start: 2024-03-18 | End: 2024-03-28
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
## 2024-03-21 ENCOUNTER — OFFICE VISIT (OUTPATIENT)
Dept: SURGERY | Facility: CLINIC | Age: 69
End: 2024-03-21
Payer: COMMERCIAL

## 2024-03-21 VITALS
OXYGEN SATURATION: 98 % | BODY MASS INDEX: 45.46 KG/M2 | DIASTOLIC BLOOD PRESSURE: 76 MMHG | HEART RATE: 76 BPM | SYSTOLIC BLOOD PRESSURE: 132 MMHG | WEIGHT: 225.5 LBS | HEIGHT: 59 IN

## 2024-03-21 DIAGNOSIS — Z51.81 ENCOUNTER FOR THERAPEUTIC DRUG MONITORING: ICD-10-CM

## 2024-03-21 DIAGNOSIS — I10 ESSENTIAL HYPERTENSION: ICD-10-CM

## 2024-03-21 DIAGNOSIS — E66.01 MORBID OBESITY WITH BMI OF 45.0-49.9, ADULT (HCC): ICD-10-CM

## 2024-03-21 DIAGNOSIS — F43.9 STRESS: ICD-10-CM

## 2024-03-21 DIAGNOSIS — E11.9 TYPE 2 DIABETES MELLITUS WITHOUT RETINOPATHY (HCC): Primary | ICD-10-CM

## 2024-03-21 PROCEDURE — 3075F SYST BP GE 130 - 139MM HG: CPT | Performed by: INTERNAL MEDICINE

## 2024-03-21 PROCEDURE — 3008F BODY MASS INDEX DOCD: CPT | Performed by: INTERNAL MEDICINE

## 2024-03-21 PROCEDURE — 3078F DIAST BP <80 MM HG: CPT | Performed by: INTERNAL MEDICINE

## 2024-03-21 PROCEDURE — 99214 OFFICE O/P EST MOD 30 MIN: CPT | Performed by: INTERNAL MEDICINE

## 2024-03-21 RX ORDER — PHENTERMINE HYDROCHLORIDE 37.5 MG/1
37.5 TABLET ORAL
Qty: 90 TABLET | Refills: 5 | Status: SHIPPED | OUTPATIENT
Start: 2024-03-21 | End: 2024-06-19
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
## 2024-03-26 ENCOUNTER — OFFICE VISIT (OUTPATIENT)
Dept: PHYSICAL THERAPY | Age: 69
End: 2024-03-26
Attending: ORTHOPAEDIC SURGERY
Payer: COMMERCIAL

## 2024-03-26 DIAGNOSIS — M54.2 CERVICALGIA: Primary | ICD-10-CM

## 2024-03-26 PROCEDURE — 97110 THERAPEUTIC EXERCISES: CPT

## 2024-03-26 PROCEDURE — 97140 MANUAL THERAPY 1/> REGIONS: CPT

## 2024-03-26 PROCEDURE — 97112 NEUROMUSCULAR REEDUCATION: CPT

## 2024-03-26 NOTE — PROGRESS NOTES
Diagnosis:   Shoulder impingement syndrome, right       Referring Provider: Radha  Date of Evaluation:    12/29/23    Precautions:  HTN, High Cholesterol  and Hyperthyroid.  Next MD visit:   none scheduled  Date of Surgery: n/a   Insurance Primary/Secondary: [employer] [place] HMO / N/A     # Auth Visits: 12            Subjective: Patient reports right ue numbness and tingling this evening. She has been doing her HEP and felt ok after her last visit.    Pain: 3/10      Objective:   Observation/Posture: Right depressed shoulder, thoracic kyphosis and forward head.  Palpation: Pain at the right shoulder region.  Sensation: Rigth forearm and finger numbness.  Cervical Screen: Decreased crom in all directions.     AROM: (* denotes performed with pain)  Shoulder    Flexion: R 153; L 180  Abduction: R 157; L 180  ER: R 67; L 90  IR: R 80; L 80      Accessory motion: Right shoulder hypomobility; +2 grades in all directions.     Flexibility: Right shoulder tightness.     Strength/MMT: (* denotes performed with pain)  Shoulder Elbow Scapular   Flexion: R 4/5; L 5/5  Abduction: R 4/5; L 5/5  ER: R 4/5; L 5/5  IR: R 4/5; L 5/5 Flexion: R 4/5; L 5/5  Extension: R 4/5; L 5/5  /5  Rhomboids: R4/5, L 5/5  Mid trap: R 4/5; L 5/5   Lats: R 4/5, L 5/5  Low trap: R 4/5; L 5/5      Special tests:   Positive right garnger-janel and empty can tests. She was negative at the left and bilaterally with neer's and speed's tests.      Assessment: Patient presents with continued pain and tenderness at the right cervical and shoulder region during her stm and prom. The patient also presents with right shoulder weakness during her strengthening and posture re-ed. exercises.      Goals: (to be met in 12 visits)   .Pt will report improved ability to sleep without waking due to shoulder pain   Pt will improve shoulder flexion AROM to >180 degrees to be able to reach into overhead cabinets without pain or restriction   Pt will improve shoulder abduction  AROM to >180 degrees to improve ability to don deodorant, don/doff shirts, and wash hair   Pt will increase shoulder AROM ER to 90 to be able to reach and fasten seatbelt   Pt will improve shoulder strength throughout to 5/5 to improve function with right ue use   Pt will demonstrate increased mid/low trap strength to 5/5 to promote improved shoulder mechanics and stabilization with lifting and reaching   Pt will be independent and compliant with comprehensive HEP to maintain progress achieved in PT      Plan: Plan to work on rom, strengthening and posture re-ed.  Date: 1/19/2024  TX#: 2/12 Date:  1/26/24               TX#: 3/12 Date:  2/15/24               TX#: 4/12 Date:  3/12/24               TX#: 5/12 Date: 3/26/24  Tx#: 6/12   Ther. Ex.: 22'  UBE 4'/4' L1  PROM at the right shoulder  Cane Supine Flex. 20x  Shoulder Shrugs 2x20  Scap. Ret. 20x3\"  Wall Walks Flex. And Abd. 10xea. Ther. Ex.: 22'  UBE 4'/4' L1  PROM at the right shoulder  Cane Supine Flex. 20x  Shoulder Shrugs 2x20  Scap. Ret. 20x3\"  Wall Walks Flex. And Abd. 10xea.  Doorway St. 4x30\"  Right Bicep St. 3x30\" Ther. Ex.: 22'  UBE 4'/4' L1  Cane Flexion 20x  Shoulder Shrugs 2x20  Scap. Ret. 20x3\"  Wall Walks Flex. And Abd. 10xea.  Doorway St. 4x30\"  Right trap., lev. And scalene st. 2x30\"xea. Ther. Ex.: 27'  UBE 4'/4' L1  Cane Flexion 20x  Shoulder Shrugs 2x20  Scap. Ret. 20x3\"  Wall Walks Flex. And Abd. 10xea.  Doorway St. 4x30\"  Right trap., lev. And scalene st. 2x30\"xea.  Cane IR St. 20x3\" Ther. Ex.: 27'  UBE 4'/4' L1  Cane Flexion 20x  Shoulder Shrugs 2x20  Scap. Ret. 20x3\"  Wall Walks Flex. And Abd. 10xea.  Doorway St. 4x30\"  Right trap., lev. And scalene st. 2x30\"xea.  Cane IR St. 20x3\"   Manual: 8'  STM at the right shoulder region Manual: 8'  STM at the right shoulder region Manual: 8'  STM at the right shoulder region Manual: 8'  STM at the right shoulder region Manual: 8'  STM at the right shoulder region   Neuro Re-ed.: 15'  Bicep Curls  20x  Cane Chest Press 20x Neuro Re-ed.: 15'  Cane Bicep Curls 20x  Cane Chest Press 20x Neuro Re-ed.: 15'  Cane Bicep Curls 20x  Cane Chest Press 20x Neuro Re-ed.: 10'  Cane Bicep Curls 20x  Cane Chest Press 20x Neuro Re-ed.: 10'  Cane Bicep Curls 20x  Cane Chest Press 20x    Modalities:   Ice at the right shoulder for 10' after her treatment unbilled. Modalities:   heat at the right cervical region for 10' after her treatment unbilled.  Modalities:   heat at the right cervical region for 10' after her treatment unbilled.   HEP: Did not add any new exercises to her HEP.    Charges: 2TE, 1Neuro and 1MT       Total Timed Treatment: 45 min  Total Treatment Time: 45 min

## 2024-03-27 DIAGNOSIS — I83.819 VARICOSE VEINS WITH PAIN: Primary | ICD-10-CM

## 2024-04-04 ENCOUNTER — OFFICE VISIT (OUTPATIENT)
Dept: PHYSICAL THERAPY | Age: 69
End: 2024-04-04
Attending: ORTHOPAEDIC SURGERY
Payer: COMMERCIAL

## 2024-04-04 DIAGNOSIS — M54.2 CERVICALGIA: Primary | ICD-10-CM

## 2024-04-04 PROCEDURE — 97110 THERAPEUTIC EXERCISES: CPT

## 2024-04-04 PROCEDURE — 97112 NEUROMUSCULAR REEDUCATION: CPT

## 2024-04-04 PROCEDURE — 97140 MANUAL THERAPY 1/> REGIONS: CPT

## 2024-04-04 NOTE — PROGRESS NOTES
Diagnosis:   Shoulder impingement syndrome, right       Referring Provider: Radha  Date of Evaluation:    12/29/23    Precautions:  HTN, High Cholesterol  and Hyperthyroid.  Next MD visit:   none scheduled  Date of Surgery: n/a   [facility]     # Auth Visits: 12            Subjective: Patient reports right cervical pain this evening after work. She has been doing her HEP and felt ok after her last visit.    Pain: 6-7/10      Objective:   Observation/Posture: Right depressed shoulder, thoracic kyphosis and forward head.  Palpation: Pain at the right shoulder region.  Sensation: Rigth forearm and finger numbness.  Cervical Screen: Decreased crom in all directions.     AROM: (* denotes performed with pain)  Shoulder    Flexion: R 153; L 180  Abduction: R 157; L 180  ER: R 67; L 90  IR: R 80; L 80      Accessory motion: Right shoulder hypomobility; +2 grades in all directions.     Flexibility: Right shoulder tightness.     Strength/MMT: (* denotes performed with pain)  Shoulder Elbow Scapular   Flexion: R 4/5; L 5/5  Abduction: R 4/5; L 5/5  ER: R 4/5; L 5/5  IR: R 4/5; L 5/5 Flexion: R 4/5; L 5/5  Extension: R 4/5; L 5/5  /5  Rhomboids: R4/5, L 5/5  Mid trap: R 4/5; L 5/5   Lats: R 4/5, L 5/5  Low trap: R 4/5; L 5/5      Special tests:   Positive right garnger-janel and empty can tests. She was negative at the left and bilaterally with neer's and speed's tests.      Assessment: Patient presents with continued pain and tenderness at the right cervical during her stm and manual stretching. The patient also presents with right shoulder weakness and bilateral rounded shoulders posture during her treatment.      Goals: (to be met in 12 visits)   .Pt will report improved ability to sleep without waking due to shoulder pain   Pt will improve shoulder flexion AROM to >180 degrees to be able to reach into overhead cabinets without pain or restriction   Pt will improve shoulder abduction  AROM to >180 degrees to improve ability to don deodorant, don/doff shirts, and wash hair   Pt will increase shoulder AROM ER to 90 to be able to reach and fasten seatbelt   Pt will improve shoulder strength throughout to 5/5 to improve function with right ue use   Pt will demonstrate increased mid/low trap strength to 5/5 to promote improved shoulder mechanics and stabilization with lifting and reaching   Pt will be independent and compliant with comprehensive HEP to maintain progress achieved in PT      Plan: Plan to work on rom, strengthening and posture re-ed.  Date: 1/19/2024  TX#: 2/12 Date:  1/26/24               TX#: 3/12 Date:  2/15/24               TX#: 4/12 Date:  3/12/24               TX#: 5/12 Date: 3/26/24  Tx#: 6/12 Date: 4/4/24  Tx#: 7/12   Ther. Ex.: 22'  UBE 4'/4' L1  PROM at the right shoulder  Cane Supine Flex. 20x  Shoulder Shrugs 2x20  Scap. Ret. 20x3\"  Wall Walks Flex. And Abd. 10xea. Ther. Ex.: 22'  UBE 4'/4' L1  PROM at the right shoulder  Cane Supine Flex. 20x  Shoulder Shrugs 2x20  Scap. Ret. 20x3\"  Wall Walks Flex. And Abd. 10xea.  Doorway St. 4x30\"  Right Bicep St. 3x30\" Ther. Ex.: 22'  UBE 4'/4' L1  Cane Flexion 20x  Shoulder Shrugs 2x20  Scap. Ret. 20x3\"  Wall Walks Flex. And Abd. 10xea.  Doorway St. 4x30\"  Right trap., lev. And scalene st. 2x30\"xea. Ther. Ex.: 27'  UBE 4'/4' L1  Cane Flexion 20x  Shoulder Shrugs 2x20  Scap. Ret. 20x3\"  Wall Walks Flex. And Abd. 10xea.  Doorway St. 4x30\"  Right trap., lev. And scalene st. 2x30\"xea.  Cane IR St. 20x3\" Ther. Ex.: 27'  UBE 4'/4' L1  Cane Flexion 20x  Shoulder Shrugs 2x20  Scap. Ret. 20x3\"  Wall Walks Flex. And Abd. 10xea.  Doorway St. 4x30\"  Right trap., lev. And scalene st. 2x30\"xea.  Cane IR St. 20x3\" Ther. Ex.: 27'  UBE 4'/4' L1  Cane Flexion 20x  Shoulder Shrugs 2x20  Scap. Ret. 20x3\"  Wall Walks Flex. And Abd. 10xea.  Doorway St. 4x30\"  Right trap., lev. And scalene st. 2x30\"xea.  Cane IR St. 20x3\"   Manual: 8'  STM at the right  shoulder region Manual: 8'  STM at the right shoulder region Manual: 8'  STM at the right shoulder region Manual: 8'  STM at the right shoulder region Manual: 8'  STM at the right shoulder region Manual: 8'  STM at the right cervical and shoulder regions   Neuro Re-ed.: 15'  Bicep Curls 20x  Cane Chest Press 20x Neuro Re-ed.: 15'  Cane Bicep Curls 20x  Cane Chest Press 20x Neuro Re-ed.: 15'  Cane Bicep Curls 20x  Cane Chest Press 20x Neuro Re-ed.: 10'  Cane Bicep Curls 20x  Cane Chest Press 20x Neuro Re-ed.: 10'  Cane Bicep Curls 20x  Cane Chest Press 20x Neuro Re-ed.: 10'  Cane Bicep Curls 20x  Cane Chest Press 20x    Modalities:   Ice at the right shoulder for 10' after her treatment unbilled. Modalities:   heat at the right cervical region for 10' after her treatment unbilled.  Modalities:   heat at the right cervical region for 10' after her treatment unbilled. Modalities:   heat at the right cervical region for 10' after her treatment unbilled.   HEP: Did not add any new exercises to her HEP.    Charges: 2TE, 1Neuro and 1MT       Total Timed Treatment: 45 min  Total Treatment Time: 45 min

## 2024-04-11 ENCOUNTER — OFFICE VISIT (OUTPATIENT)
Dept: PHYSICAL THERAPY | Age: 69
End: 2024-04-11
Attending: ORTHOPAEDIC SURGERY
Payer: COMMERCIAL

## 2024-04-11 DIAGNOSIS — M54.2 CERVICALGIA: Primary | ICD-10-CM

## 2024-04-11 PROCEDURE — 97112 NEUROMUSCULAR REEDUCATION: CPT

## 2024-04-11 PROCEDURE — 97110 THERAPEUTIC EXERCISES: CPT

## 2024-04-11 PROCEDURE — 97140 MANUAL THERAPY 1/> REGIONS: CPT

## 2024-04-11 NOTE — PROGRESS NOTES
Diagnosis:   Shoulder impingement syndrome, right       Referring Provider: Radha  Date of Evaluation:    12/29/23    Precautions:  HTN, High Cholesterol  and Hyperthyroid.  Next MD visit:   none scheduled  Date of Surgery: n/a   Insurance Primary/Secondary: [employer] [place] H[place] / N/A     # Auth Visits: 12            Subjective: Patient reports decreased right cervical pain this evening. She has been doing her HEP and felt ok after her last visit.    Pain: 3-4/10      Objective:   Observation/Posture: Right depressed shoulder, thoracic kyphosis and forward head.  Palpation: Pain at the right shoulder region.  Sensation: Rigth forearm and finger numbness.  Cervical Screen: Decreased crom in all directions.     AROM: (* denotes performed with pain)  Shoulder    Flexion: R 153; L 180  Abduction: R 157; L 180  ER: R 67; L 90  IR: R 80; L 80      Accessory motion: Right shoulder hypomobility; +2 grades in all directions.     Flexibility: Right shoulder tightness.     Strength/MMT: (* denotes performed with pain)  Shoulder Elbow Scapular   Flexion: R 4/5; L 5/5  Abduction: R 4/5; L 5/5  ER: R 4/5; L 5/5  IR: R 4/5; L 5/5 Flexion: R 4/5; L 5/5  Extension: R 4/5; L 5/5  /5  Rhomboids: R4/5, L 5/5  Mid trap: R 4/5; L 5/5   Lats: R 4/5, L 5/5  Low trap: R 4/5; L 5/5      Special tests:   Positive right granger-janel and empty can tests. She was negative at the left and bilaterally with neer's and speed's tests.      Assessment: Patient presents with continued pain and tenderness at the right cervical region during her stm and manual stretching. The patient also presents with right shoulder tightness during her aarom exercises.      Goals: (to be met in 12 visits)   .Pt will report improved ability to sleep without waking due to shoulder pain   Pt will improve shoulder flexion AROM to >180 degrees to be able to reach into overhead cabinets without pain or restriction   Pt will improve shoulder abduction AROM to >180 degrees to  improve ability to don deodorant, don/doff shirts, and wash hair   Pt will increase shoulder AROM ER to 90 to be able to reach and fasten seatbelt   Pt will improve shoulder strength throughout to 5/5 to improve function with right ue use   Pt will demonstrate increased mid/low trap strength to 5/5 to promote improved shoulder mechanics and stabilization with lifting and reaching   Pt will be independent and compliant with comprehensive HEP to maintain progress achieved in PT      Plan: Plan to work on rom, strengthening and posture re-ed.  Date: 1/19/2024  TX#: 2/12 Date:  1/26/24               TX#: 3/12 Date:  2/15/24               TX#: 4/12 Date:  3/12/24               TX#: 5/12 Date: 3/26/24  Tx#: 6/12 Date: 4/4/24  Tx#: 7/12 Date: 4/11/24  Tx#: 8/12   Ther. Ex.: 22'  UBE 4'/4' L1  PROM at the right shoulder  Cane Supine Flex. 20x  Shoulder Shrugs 2x20  Scap. Ret. 20x3\"  Wall Walks Flex. And Abd. 10xea. Ther. Ex.: 22'  UBE 4'/4' L1  PROM at the right shoulder  Cane Supine Flex. 20x  Shoulder Shrugs 2x20  Scap. Ret. 20x3\"  Wall Walks Flex. And Abd. 10xea.  Doorway St. 4x30\"  Right Bicep St. 3x30\" Ther. Ex.: 22'  UBE 4'/4' L1  Cane Flexion 20x  Shoulder Shrugs 2x20  Scap. Ret. 20x3\"  Wall Walks Flex. And Abd. 10xea.  Doorway St. 4x30\"  Right trap., lev. And scalene st. 2x30\"xea. Ther. Ex.: 27'  UBE 4'/4' L1  Cane Flexion 20x  Shoulder Shrugs 2x20  Scap. Ret. 20x3\"  Wall Walks Flex. And Abd. 10xea.  Doorway St. 4x30\"  Right trap., lev. And scalene st. 2x30\"xea.  Cane IR St. 20x3\" Ther. Ex.: 27'  UBE 4'/4' L1  Cane Flexion 20x  Shoulder Shrugs 2x20  Scap. Ret. 20x3\"  Wall Walks Flex. And Abd. 10xea.  Doorway St. 4x30\"  Right trap., lev. And scalene st. 2x30\"xea.  Cane IR St. 20x3\" Ther. Ex.: 27'  UBE 4'/4' L1  Cane Flexion 20x  Shoulder Shrugs 2x20  Scap. Ret. 20x3\"  Wall Walks Flex. And Abd. 10xea.  Doorway St. 4x30\"  Right trap., lev. And scalene st. 2x30\"xea.  Cane IR St. 20x3\" Ther. Ex.: 10'  UBE 4'/4' L1  Cane  Flexion 20x  Right trap., lev. And scalene st. 2x30\"xea.  Cane IR St. 20x3\"   Manual: 8'  STM at the right shoulder region Manual: 8'  STM at the right shoulder region Manual: 8'  STM at the right shoulder region Manual: 8'  STM at the right shoulder region Manual: 8'  STM at the right shoulder region Manual: 8'  STM at the right cervical and shoulder regions Manual: 8'  STM at the right cervical and shoulder regions   Neuro Re-ed.: 15'  Bicep Curls 20x  Cane Chest Press 20x Neuro Re-ed.: 15'  Cane Bicep Curls 20x  Cane Chest Press 20x Neuro Re-ed.: 15'  Cane Bicep Curls 20x  Cane Chest Press 20x Neuro Re-ed.: 10'  Cane Bicep Curls 20x  Cane Chest Press 20x Neuro Re-ed.: 10'  Cane Bicep Curls 20x  Cane Chest Press 20x Neuro Re-ed.: 10'  Cane Bicep Curls 20x  Cane Chest Press 20x Neuro Re-ed.: 8'  Cane Bicep Curls 20x  Cane Chest Press 20x    Modalities:   Ice at the right shoulder for 10' after her treatment unbilled. Modalities:   heat at the right cervical region for 10' after her treatment unbilled.  Modalities:   heat at the right cervical region for 10' after her treatment unbilled. Modalities:   heat at the right cervical region for 10' after her treatment unbilled.    HEP: Did not add any new exercises to her HEP.    Charges: 1TE, 1Neuro and 1MT       Total Timed Treatment: 26 min  Total Treatment Time: 26 min had to leave early.

## 2024-04-16 DIAGNOSIS — B02.8 HERPES ZOSTER WITH COMPLICATION: ICD-10-CM

## 2024-04-17 RX ORDER — GABAPENTIN 300 MG/1
300 CAPSULE ORAL NIGHTLY PRN
Qty: 30 CAPSULE | Refills: 0 | Status: SHIPPED | OUTPATIENT
Start: 2024-04-17

## 2024-04-17 NOTE — TELEPHONE ENCOUNTER
Refill passed per [facility] protocol.  Requested Prescriptions   Pending Prescriptions Disp Refills    GABAPENTIN 300 MG Oral Cap [Pharmacy Med Name: GABAPENTIN 300MG CAPSULES] 30 capsule 0     Sig: TAKE 1 CAPSULE(300 MG) BY MOUTH EVERY NIGHT AS NEEDED       Neurology Medications Passed - 4/16/2024 12:25 PM        Passed - In person appointment or virtual visit in the past 6 mos or appointment in next 3 mos     Recent Outpatient Visits              6 days ago Cervicalgia    [facility] [place] Lore Quintana, PT    Office Visit    1 week ago Cervicalgia    [facility] [place] Lore Quintana, PT    Office Visit    3 weeks ago Cervicalgia    [facility] [place] Lore Quintana, PT    Office Visit    3 weeks ago Type 2 diabetes mellitus without retinopathy (HCC)    [facility] Max Mejia MD    Office Visit    1 month ago Herpes zoster with complication    [facility] Israel Guadalupe MD    Office Visit          Future Appointments         Provider Department Appt Notes    Tomorrow Lore Quintana, PT [facility] [place] 10 visits 1/1 to 5/30  [facility]    In 5 days Georgette Rodriguez MD [facility]     In 6 days Hope Darden DO [facility] EMG- RUE    In 1 week [address] Ultrasound     In 3 weeks Shannon Fernández MD [facility] Dx: Seborrheic dermatitis (L21.9)   Signed Referral [facility]:    Number of Visits: 3    In 5 months Max Mejia MD [facility]                        Recent Outpatient Visits              6 days ago Cervicalgia    [facility] [place] Lore Quintana, PT    Office Visit    1 week ago Cervicalgia    [facility]  Lore [place], PT    Office Visit    3 weeks ago Cervicalgia    [facility] [place] Lore Quintana, PT    Office Visit    3 weeks ago Type 2 diabetes mellitus without retinopathy (HCC)    [facility] Max Mejia MD    Office Visit    1 month ago Herpes zoster with complication    [facility] Israel Guadalupe MD    Office Visit          Future Appointments         Provider Department Appt Notes    Tomorrow Lore Quintana, PT [facility] [place] 10 visits 1/1 to 5/30  [facility], [facility] visits    In 5 days Georgette Rodriguez MD [facility]     In 6 days Hope Darden DO [facility] EMG- RUE    In 1 week [facility] Ultrasound     In 3 weeks Shannon Fernández MD [facility] Dx: Seborrheic dermatitis (L21.9)   Signed Referral [facility]:    Number of Visits: 3    In 5 months Max Mejia MD [facility]

## 2024-04-18 ENCOUNTER — OFFICE VISIT (OUTPATIENT)
Dept: PHYSICAL THERAPY | Age: 69
End: 2024-04-18
Attending: ORTHOPAEDIC SURGERY
Payer: COMMERCIAL

## 2024-04-18 DIAGNOSIS — M54.2 CERVICALGIA: Primary | ICD-10-CM

## 2024-04-18 PROCEDURE — 97110 THERAPEUTIC EXERCISES: CPT

## 2024-04-18 PROCEDURE — 97112 NEUROMUSCULAR REEDUCATION: CPT

## 2024-04-18 PROCEDURE — 97140 MANUAL THERAPY 1/> REGIONS: CPT

## 2024-04-18 NOTE — PROGRESS NOTES
Diagnosis:   Shoulder impingement syndrome, right       Referring Provider: Radha  Date of Evaluation:    12/29/23    Precautions:  HTN, High Cholesterol  and Hyperthyroid.  Next MD visit:   none scheduled  Date of Surgery: n/a   Insurance Primary/Secondary: [employer] [place] H[place] / N/A     # Auth Visits: 12            Subjective: Patient reports continued cervical pain this evening with fatigue. She has been doing her HEP and felt ok after her last visit.    Pain: 3-4/10      Objective:   Observation/Posture: Right depressed shoulder, thoracic kyphosis and forward head.  Palpation: Pain at the right shoulder region.  Sensation: Rigth forearm and finger numbness.  Cervical Screen: Decreased crom in all directions.     AROM: (* denotes performed with pain)  Shoulder    Flexion: R 153; L 180  Abduction: R 157; L 180  ER: R 67; L 90  IR: R 80; L 80      Accessory motion: Right shoulder hypomobility; +2 grades in all directions.     Flexibility: Right shoulder tightness.     Strength/MMT: (* denotes performed with pain)  Shoulder Elbow Scapular   Flexion: R 4/5; L 5/5  Abduction: R 4/5; L 5/5  ER: R 4/5; L 5/5  IR: R 4/5; L 5/5 Flexion: R 4/5; L 5/5  Extension: R 4/5; L 5/5  /5  Rhomboids: R4/5, L 5/5  Mid trap: R 4/5; L 5/5   Lats: R 4/5, L 5/5  Low trap: R 4/5; L 5/5      Special tests:   Positive right granger-janel and empty can tests. She was negative at the left and bilaterally with neer's and speed's tests.      Assessment: Patient presents with continued pain and tenderness at the right cervical region during her stm and manual stretching. The patient also presents with right shoulder tightness during her aarom exercises.      Goals: (to be met in 12 visits)   .Pt will report improved ability to sleep without waking due to shoulder pain   Pt will improve shoulder flexion AROM to >180 degrees to be able to reach into overhead cabinets without pain or restriction   Pt will improve shoulder abduction AROM to >180 degrees  to improve ability to don deodorant, don/doff shirts, and wash hair   Pt will increase shoulder AROM ER to 90 to be able to reach and fasten seatbelt   Pt will improve shoulder strength throughout to 5/5 to improve function with right ue use   Pt will demonstrate increased mid/low trap strength to 5/5 to promote improved shoulder mechanics and stabilization with lifting and reaching   Pt will be independent and compliant with comprehensive HEP to maintain progress achieved in PT      Plan: Plan to work on rom, strengthening and posture re-ed.  Date: 1/19/2024  TX#: 2/12 Date:  1/26/24               TX#: 3/12 Date:  2/15/24               TX#: 4/12 Date:  3/12/24               TX#: 5/12 Date: 3/26/24  Tx#: 6/12 Date: 4/4/24  Tx#: 7/12 Date: 4/11/24  Tx#: 8/12 Date: 4/18/24  Tx#: 9/12   Ther. Ex.: 22'  UBE 4'/4' L1  PROM at the right shoulder  Cane Supine Flex. 20x  Shoulder Shrugs 2x20  Scap. Ret. 20x3\"  Wall Walks Flex. And Abd. 10xea. Ther. Ex.: 22'  UBE 4'/4' L1  PROM at the right shoulder  Cane Supine Flex. 20x  Shoulder Shrugs 2x20  Scap. Ret. 20x3\"  Wall Walks Flex. And Abd. 10xea.  Doorway St. 4x30\"  Right Bicep St. 3x30\" Ther. Ex.: 22'  UBE 4'/4' L1  Cane Flexion 20x  Shoulder Shrugs 2x20  Scap. Ret. 20x3\"  Wall Walks Flex. And Abd. 10xea.  Doorway St. 4x30\"  Right trap., lev. And scalene st. 2x30\"xea. Ther. Ex.: 27'  UBE 4'/4' L1  Cane Flexion 20x  Shoulder Shrugs 2x20  Scap. Ret. 20x3\"  Wall Walks Flex. And Abd. 10xea.  Doorway St. 4x30\"  Right trap., lev. And scalene st. 2x30\"xea.  Cane IR St. 20x3\" Ther. Ex.: 27'  UBE 4'/4' L1  Cane Flexion 20x  Shoulder Shrugs 2x20  Scap. Ret. 20x3\"  Wall Walks Flex. And Abd. 10xea.  Doorway St. 4x30\"  Right trap., lev. And scalene st. 2x30\"xea.  Cane IR St. 20x3\" Ther. Ex.: 27'  UBE 4'/4' L1  Cane Flexion 20x  Shoulder Shrugs 2x20  Scap. Ret. 20x3\"  Wall Walks Flex. And Abd. 10xea.  Doorway St. 4x30\"  Right trap., lev. And scalene st. 2x30\"xea.  Cane IR St. 20x3\" Ther.  Ex.: 10'  UBE 4'/4' L1  Cane Flexion 20x  Right trap., lev. And scalene st. 2x30\"xea.  Cane IR St. 20x3\" Ther. Ex.: 20'  UBE 4'/4' L1  Cane Flexion 20x  Right trap., lev. And scalene st. 2x30\"xea.  Cane IR St. 20x3\"  Doorway St. 4x30\"  Shoulder Shrugs 2x20  Scap. Ret. 20x3\"     Manual: 8'  STM at the right shoulder region Manual: 8'  STM at the right shoulder region Manual: 8'  STM at the right shoulder region Manual: 8'  STM at the right shoulder region Manual: 8'  STM at the right shoulder region Manual: 8'  STM at the right cervical and shoulder regions Manual: 8'  STM at the right cervical and shoulder regions Manual: 8'  STM at the right cervical and shoulder regions   Neuro Re-ed.: 15'  Bicep Curls 20x  Cane Chest Press 20x Neuro Re-ed.: 15'  Cane Bicep Curls 20x  Cane Chest Press 20x Neuro Re-ed.: 15'  Cane Bicep Curls 20x  Cane Chest Press 20x Neuro Re-ed.: 10'  Cane Bicep Curls 20x  Cane Chest Press 20x Neuro Re-ed.: 10'  Cane Bicep Curls 20x  Cane Chest Press 20x Neuro Re-ed.: 10'  Cane Bicep Curls 20x  Cane Chest Press 20x Neuro Re-ed.: 8'  Cane Bicep Curls 20x  Cane Chest Press 20x Neuro Re-ed.: 10'  Cane Bicep Curls 20x  Cane Chest Press 20x    Modalities:   Ice at the right shoulder for 10' after her treatment unbilled. Modalities:   heat at the right cervical region for 10' after her treatment unbilled.  Modalities:   heat at the right cervical region for 10' after her treatment unbilled. Modalities:   heat at the right cervical region for 10' after her treatment unbilled.     HEP: Did not add any new exercises to her HEP.    Charges: 1TE, 1Neuro and 1MT       Total Timed Treatment: 38 min  Total Treatment Time: 38 min.

## 2024-04-22 ENCOUNTER — OFFICE VISIT (OUTPATIENT)
Dept: ENDOCRINOLOGY CLINIC | Facility: CLINIC | Age: 69
End: 2024-04-22
Payer: COMMERCIAL

## 2024-04-22 VITALS
WEIGHT: 225 LBS | HEART RATE: 80 BPM | SYSTOLIC BLOOD PRESSURE: 138 MMHG | BODY MASS INDEX: 45 KG/M2 | DIASTOLIC BLOOD PRESSURE: 85 MMHG

## 2024-04-22 DIAGNOSIS — E78.5 DYSLIPIDEMIA: ICD-10-CM

## 2024-04-22 DIAGNOSIS — E11.69 TYPE 2 DIABETES MELLITUS WITH OTHER SPECIFIED COMPLICATION, WITHOUT LONG-TERM CURRENT USE OF INSULIN (HCC): Primary | ICD-10-CM

## 2024-04-22 DIAGNOSIS — E03.9 HYPOTHYROIDISM, UNSPECIFIED TYPE: ICD-10-CM

## 2024-04-22 LAB
GLUCOSE BLOOD: 182
HEMOGLOBIN A1C: 6 % (ref 4.3–5.6)
TEST STRIP LOT #: NORMAL NUMERIC

## 2024-04-22 PROCEDURE — 3079F DIAST BP 80-89 MM HG: CPT | Performed by: INTERNAL MEDICINE

## 2024-04-22 PROCEDURE — 3075F SYST BP GE 130 - 139MM HG: CPT | Performed by: INTERNAL MEDICINE

## 2024-04-22 PROCEDURE — 83036 HEMOGLOBIN GLYCOSYLATED A1C: CPT | Performed by: INTERNAL MEDICINE

## 2024-04-22 PROCEDURE — 3044F HG A1C LEVEL LT 7.0%: CPT | Performed by: INTERNAL MEDICINE

## 2024-04-22 PROCEDURE — 82947 ASSAY GLUCOSE BLOOD QUANT: CPT | Performed by: INTERNAL MEDICINE

## 2024-04-22 PROCEDURE — 99213 OFFICE O/P EST LOW 20 MIN: CPT | Performed by: INTERNAL MEDICINE

## 2024-04-22 RX ORDER — TIRZEPATIDE 15 MG/.5ML
15 INJECTION, SOLUTION SUBCUTANEOUS WEEKLY
Qty: 6 ML | Refills: 0 | Status: SHIPPED | OUTPATIENT
Start: 2024-04-22

## 2024-04-22 RX ORDER — LEVOTHYROXINE SODIUM 137 UG/1
137 TABLET ORAL
Qty: 90 TABLET | Refills: 0 | Status: SHIPPED | OUTPATIENT
Start: 2024-04-22

## 2024-04-22 NOTE — PROGRESS NOTES
Return Office Visit     CHIEF COMPLAINT:  Type 2 DM     Hypothyroidism    HISTORY OF PRESENT ILLNESS:  Jing Irving is a 68 year old female who presents for follow up for for Type 2 DM and hypothyroidism     DM HISTORY  Diagnosed:  2018     HISTORY OF DIABETES COMPLICATIONS: :  History of Retinopathy: No - last eye exam: 2023  History of Neuropathy: no  History of Nephropathy: no     ASSOCIATED COMPLICATIONS:   HTN: yes  Hyperlipidemia: yes  Coronary Artery Disease:  no  Cerebrovascular Disease: no        HOME GLUCOSE READINGS:   Checks sugars a few times week   100-120 on most occasions     No symptoms of hypoglycemia.          CURRENT DIABETIC MEDICATIONS INCLUDE  Mounjaro 12.5  mg weekly          MEALS:  Dietary compliance with a low carb diet is moderate    EXERCISE:  No     HYPOTHYROIDISM:   She is on LT4 137 mcg PO daily  Reports compliance  Takes empty stomach       CURRENT MEDICATION:    Current Outpatient Medications   Medication Sig Dispense Refill    Tirzepatide (MOUNJARO) 15 MG/0.5ML Subcutaneous Solution Pen-injector Inject 15 mg into the skin once a week. 6 mL 0    levothyroxine 137 MCG Oral Tab Take 137 mcg by mouth before breakfast. 90 tablet 0    gabapentin 300 MG Oral Cap Take 1 capsule (300 mg total) by mouth nightly as needed. 30 capsule 0    Phentermine HCl 37.5 MG Oral Tab Take 1 tablet (37.5 mg total) by mouth every morning before breakfast. 90 tablet 5    Azelastine HCl 137 MCG/SPRAY Nasal Solution 1-2 sprays by Nasal route in the morning and 1-2 sprays before bedtime. FOR SINUS SYMPTOMS/NASAL CONGESTION.. 30 mL 3    fluticasone propionate 50 MCG/ACT Nasal Suspension 2 sprays by Each Nare route daily. FOR NASAL CONGESTION/SINUS SYMPTOMS. 3 each 3    metoprolol succinate ER 25 MG Oral Tablet 24 Hr Take 1 tablet (25 mg total) by mouth daily. 90 tablet 1    lisinopril-hydroCHLOROthiazide 20-25 MG Oral Tab Take 1 tablet by mouth daily. 90 tablet 1    rosuvastatin 20 MG Oral Tab Take 1 tablet  (20 mg total) by mouth daily. 90 tablet 1    albuterol 108 (90 Base) MCG/ACT Inhalation Aero Soln Inhale 1 puff into the lungs every 6 (six) hours as needed for Wheezing. 1 each 0    Budesonide-Formoterol Fumarate (SYMBICORT) 160-4.5 MCG/ACT Inhalation Aerosol Inhale 2 puffs into the lungs 2 (two) times daily. 1 each 5         ALLERGY:  No Known Allergies    PAST MEDICAL, SOCIAL AND FAMILY HISTORY:  See past medical history marked as reviewed.  See past surgical history marked as reviewed.  See past family history marked as reviewed.  See past social history marked as reviewed.    ASSESSMENTS:     REVIEW OF SYSTEMS:  Constitutional: Negative for:  fever,  fatigue, cold/heat intolerance,  weight changes  Eyes: Negative for:  Visual changes, proptosis, blurring  ENT: Negative for:  dysphagia, neck swelling, dysphonia  Respiratory: Negative for:  dyspnea, cough  Cardiovascular: Negative for:  chest pain, palpitations, orthopnea  GI: Negative for:  abdominal pain, nausea, vomiting, diarrhea, constipation, bleeding  Neurology: Negative for: headache, numbness, weakness  Genito-Urinary: Negative for: dysuria, frequency  Psychiatric: Negative for:  depression, anxiety  Hematology/Lymphatics: Negative for: bruising, lower extremity edema  Endocrine: Negative for: polyuria, polydypsia  Skin: Negative for: rash, blister, cellulitis,       PHYSICAL EXAM:  Vitals reviewed    General Appearance:  alert, well developed, in no acute distress  Head: Atraumatic  Eyes:  normal conjunctivae, sclera., normal sclera and normal pupils  Throat/Neck: normal sound to voice. Normal hearing, normal speech  Respiratory:  Speaking in full sentences, non-labored. no increased work of breathing, no audible wheezing    Neuro: motor grossly intact, moving all extremities without difficulty  Psychiatric:  oriented to time, self, and place  Extremities: 8/2023  Bilateral barefoot skin diabetic exam is normal, visualized feet and the appearance is  normal.  Bilateral monofilament/sensation of both feet is normal.  Pulsation pedal pulse exam of both lower legs/feet is normal as well.          ASSESSMENT AND PLAN:      1. Type 2 DM  A1c is 6.0 %  ( 3/2024)  -Reviewed the pathogenesis, natural course of diabetes. Patient understands the importance of glycemic control and the implications of uncontrolled diabetes including Diabetic ketoacidosis and various micro vascular and macrovascular complications.       A1c is good. Will increase dose for additional appetite suppression     - Mounjaro  12.5 --> 15 mg subcutaneous weekly  No personal or family history of MEN syndrome  Patient counselled regarding side effects including injection site reactions, nausea, vomiting, diarrhea, pancreatitis, gastroparesis and rare side effect sintia Patrick syndrome.          Check and call with BG as discussed     Last Ur Ma normal     Up to date with eye exam    2. .Dyslipidemia  LDL is good now   C/w  rosuvastatin 20 mg daily   -Reviewed importance of low fat diet  Daily exercise  3. Hypothyroidism  TSH is normal  LT4 137 mcg daily   Reviewed compliance and admnistration    Patient verbalized a complete  understanding of all of the above and did not have any further questions.       RTC in 4-5 months

## 2024-04-23 ENCOUNTER — PROCEDURE VISIT (OUTPATIENT)
Dept: PHYSICAL MEDICINE AND REHAB | Facility: CLINIC | Age: 69
End: 2024-04-23
Payer: COMMERCIAL

## 2024-04-23 DIAGNOSIS — R20.2 PARESTHESIA OF FINGER: ICD-10-CM

## 2024-04-23 PROCEDURE — 95886 MUSC TEST DONE W/N TEST COMP: CPT | Performed by: PHYSICAL MEDICINE & REHABILITATION

## 2024-04-23 PROCEDURE — 95909 NRV CNDJ TST 5-6 STUDIES: CPT | Performed by: PHYSICAL MEDICINE & REHABILITATION

## 2024-04-23 NOTE — PROCEDURES
[facility]   Nerve Conduction Study and Electromyography Procedure Note          Motor NCS      Nerve / Sites Muscle Latency Amplitude Amp % Duration Segments Distance Lat Diff Velocity     ms mV % ms  cm ms m/s   R Median - APB      Wrist APB 4.10 11.5 100 6.71 Wrist - APB 8        Elbow APB 7.08 11.1 96.6 6.92 Elbow - Wrist 16 2.98 54   R Ulnar - ADM      Wrist ADM 2.85 13.1 100 5.77 Wrist - ADM 8        B.Elbow ADM 5.31 12.5 95.7 6.02 B.Elbow - Wrist 17 2.46 69      A.Elbow ADM 6.98 12.4 94.9 6.04 A.Elbow - B.Elbow 11 1.67 66       Sensory NCS      Nerve / Sites Rec. Site Onset Lat Peak Lat NP Amp PP Amp Segments Distance Velocity     ms ms µV µV  cm m/s   R Median - Digit III      Wrist Dig II 2.85 3.65 35.1 51.5 Wrist - Dig II 14 49   R Ulnar - Digit  V      Wrist Dig V 2.52 3.15 27.8 40.6 Wrist - Dig V 14 56   R Radial - Wrist      Forearm Wrist 1.21 2.17 69.1 10.9 Forearm - Wrist 10 83       EMG Summary Table     Spontaneous MUAP Recruitment   Muscle Nerve Roots IA Fib PSW Fasc H.F. Comments Amp Dur. PPP Pattern   R. Deltoid (posterior) Axillary C5-C6 N None None None None Normal N N N N   R. Triceps brachii Radial C6-C8 N None None None None Normal N N N N   R. Flexor carpi radialis Median C6-C7 N None None None None Normal N N N N   R. First dorsal interosseous Ulnar C8-T1 N None None None None Normal N N N N   R. Abductor pollicis brevis Median C8-T1 N None None None None Normal N N N N                      Summary    The motor conduction test was performed on 3 nerve(s). The results were normal in 2 nerve(s): R Median - APB, R Ulnar - ADM. Findings were unremarkable in 1 nerve(s): R Median - APB. There were no results outside the specified normal range.      The sensory conduction test was performed on 3 nerve(s). The results were normal in 2 nerve(s): R Ulnar - Digit  V, R Radial - Wrist. Results outside the specified normal range were found in 1 nerve(s),  as follows:  In the R Median - Digit III study  the peak latency was increased for Wrist stimulation    The needle EMG study was normal in all 5 tested muscles: R. Deltoid (posterior), R. Triceps brachii, R. Flexor carpi radialis, R. First dorsal interosseous, R. Abductor pollicis brevis.        Conclusion: This is an ABNORMAL study.    There is electrodiagnostic evidence of a RIGHT upper extremity mild sensory demyelinating median nerve mononeuropathy at the wrist consistent with carpal tunnel syndrome.  There are no signs of active denervation and chronic reinnervation in the right APB muscle.  Recommend night splints for the next 4 to 6 weeks.  There is no evidence of a RIGHT brachial plexopathy, myopathy, peripheral neuropathy or radiculopathy in this study.      Thank you for allowing us to participate in the care of your patient.    Hope Darden DO, FAAPMR & CAQSM  Physical Medicine and Rehabilitation/Sports Medicine  [facility]

## 2024-04-29 ENCOUNTER — HOSPITAL ENCOUNTER (OUTPATIENT)
Dept: ULTRASOUND IMAGING | Facility: HOSPITAL | Age: 69
Discharge: HOME | End: 2024-04-29
Attending: NURSE PRACTITIONER
Payer: COMMERCIAL

## 2024-04-29 DIAGNOSIS — I83.819 VARICOSE VEINS WITH PAIN: ICD-10-CM

## 2024-04-29 PROCEDURE — 93970 EXTREMITY STUDY: CPT | Performed by: NURSE PRACTITIONER

## 2024-05-08 ENCOUNTER — TELEPHONE (OUTPATIENT)
Dept: DERMATOLOGY CLINIC | Facility: CLINIC | Age: 69
End: 2024-05-08

## 2024-05-08 ENCOUNTER — OFFICE VISIT (OUTPATIENT)
Dept: DERMATOLOGY CLINIC | Facility: CLINIC | Age: 69
End: 2024-05-08
Payer: COMMERCIAL

## 2024-05-08 DIAGNOSIS — L40.8 OTHER PSORIASIS: Primary | ICD-10-CM

## 2024-05-08 PROCEDURE — 99214 OFFICE O/P EST MOD 30 MIN: CPT | Performed by: DERMATOLOGY

## 2024-05-08 RX ORDER — APREMILAST 10-20-30MG
KIT ORAL
Qty: 55 EACH | Refills: 0 | Status: SHIPPED | OUTPATIENT
Start: 2024-05-08

## 2024-05-08 RX ORDER — TIRZEPATIDE 10 MG/.5ML
10 INJECTION, SOLUTION SUBCUTANEOUS
COMMUNITY
Start: 2023-12-02

## 2024-05-08 RX ORDER — APREMILAST 30 MG/1
1 TABLET, FILM COATED ORAL 2 TIMES DAILY
Qty: 60 TABLET | Refills: 5 | Status: SHIPPED | OUTPATIENT
Start: 2024-05-08 | End: 2024-06-07

## 2024-05-10 RX ORDER — FLUOCINOLONE ACETONIDE 0.11 MG/ML
OIL TOPICAL
Qty: 118 ML | Refills: 2 | Status: SHIPPED | OUTPATIENT
Start: 2024-05-10

## 2024-05-10 RX ORDER — TRIAMCINOLONE ACETONIDE 5 MG/G
CREAM TOPICAL
Qty: 60 G | Refills: 1 | Status: SHIPPED | OUTPATIENT
Start: 2024-05-10

## 2024-05-10 NOTE — TELEPHONE ENCOUNTER
Fax from [email or web address] - We have received your prescription referral and are verifying insurance information.
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
## 2024-05-13 NOTE — TELEPHONE ENCOUNTER
Current Outpatient Medications:       metoprolol succinate ER 25 MG Oral Tablet 24 Hr, Take 1 tablet (25 mg total) by mouth daily., Disp: 90 tablet, Rfl: 1

## 2024-05-13 NOTE — TELEPHONE ENCOUNTER
Fax from [facility]    Pa required. We will inform you once the PA has been submitted    Placed fax in pa inbox

## 2024-05-15 RX ORDER — METOPROLOL SUCCINATE 25 MG/1
25 TABLET, EXTENDED RELEASE ORAL DAILY
Qty: 90 TABLET | Refills: 3 | Status: SHIPPED | OUTPATIENT
Start: 2024-05-15

## 2024-05-15 NOTE — TELEPHONE ENCOUNTER
Refill passed per [facility] protocol.  Requested Prescriptions   Pending Prescriptions Disp Refills    metoprolol succinate ER 25 MG Oral Tablet 24 Hr 90 tablet 1     Sig: Take 1 tablet (25 mg total) by mouth daily.       Hypertension Medications Protocol Passed - 5/13/2024 11:05 AM        Passed - CMP or BMP in past 12 months        Passed - Last BP reading less than 140/90     BP Readings from Last 1 Encounters:   04/22/24 138/85               Passed - In person appointment or virtual visit in the past 12 mos or appointment in next 3 mos     Recent Outpatient Visits              1 week ago Other psoriasis    [facility] Shannon Fernández MD    Office Visit    3 weeks ago Type 2 diabetes mellitus with other specified complication, without long-term current use of insulin ([facility])    Pending sale to [facility] Georgette Rodriguez MD    Office Visit    3 weeks ago Cervicalgia    [place]  [facility] [place] Lore Quintana, PT    Office Visit    1 month ago Cervicalgia    [place]  [facility]    Office Visit    1 month ago Cervicalgia    [place]  [facility]    [facility] Visit          Future Appointments         Provider Department Appt Notes    In 1 month Shannon Fernández MD [facility]     In 4 months Max Mejia MD [facility] 2 mon f/u                    Passed - EGFRCR or GFRNAA > 50     GFR Evaluation  EGFRCR: 83 , resulted on 7/23/2023             Recent Outpatient Visits              1 week ago Other psoriasis    [facility] Shannon Fernández MD    Office Visit    3 weeks ago Type 2 diabetes mellitus with other specified complication, without long-term current use of insulin ([facility])    [facility],  Georgette Siddiqui MD    Office Visit    3 weeks ago Cervicalgia    [place]  [facility] [place], PT    Office Visit    1 month ago Cervicalgia    [place]  [facility] [place], PT    Office Visit    1 month ago Cervicalgia    [place]  [facility] [place], PT    Office Visit          Future Appointments         Provider Department Appt Notes    In 1 month [facility]     In 4 months [facility] 2 [facility] f/u

## 2024-05-21 ENCOUNTER — HOSPITAL ENCOUNTER (OUTPATIENT)
Dept: ULTRASOUND IMAGING | Facility: HOSPITAL | Age: 69
Discharge: HOME | End: 2024-05-21
Attending: NURSE PRACTITIONER

## 2024-05-21 ENCOUNTER — TELEPHONE (OUTPATIENT)
Dept: INTERNAL MEDICINE CLINIC | Facility: CLINIC | Age: 69
End: 2024-05-21

## 2024-05-21 DIAGNOSIS — I83.90 VARICOSE VEINS: ICD-10-CM

## 2024-05-21 DIAGNOSIS — M25.562 LEFT KNEE PAIN, UNSPECIFIED CHRONICITY: Primary | ICD-10-CM

## 2024-05-21 NOTE — TELEPHONE ENCOUNTER
Left detailed message (per HONORIO) informing patient Dr. Pineda has placed the order for left knee xray. Provided central scheduling phone number as well as nurse number for any questions.    Also sent [place] message.

## 2024-05-21 NOTE — TELEPHONE ENCOUNTER
Left voicemail to call back our office. Office phone number provided with telephone hours.    FYI: Dr. Pineda did not order the test in question and has not seen him since 02/2024

## 2024-05-21 NOTE — TELEPHONE ENCOUNTER
Patient returned call.  Has seen Dr. Pineda previously for leg symptoms. Pressure and fullness, was referred to vascular and evaluated.  US was done, which was negative for reflux or venous insufficiency.  She was advised to have plain films done of the knee to rule out arthritis.  Left knee, Dr. Pineda please advise.

## 2024-05-21 NOTE — TELEPHONE ENCOUNTER
Patient called and said she got an ultrasound done today and would like  to review the results, they are suggesting an xray of the knee cap additional imaging because they are suspecting arthritis, please advise.

## 2024-05-22 ENCOUNTER — HOSPITAL ENCOUNTER (OUTPATIENT)
Dept: GENERAL RADIOLOGY | Age: 69
Discharge: HOME | End: 2024-05-22
Attending: INTERNAL MEDICINE

## 2024-05-22 ENCOUNTER — TELEPHONE (OUTPATIENT)
Dept: INTERNAL MEDICINE CLINIC | Facility: CLINIC | Age: 69
End: 2024-05-22

## 2024-05-22 DIAGNOSIS — M17.12 PRIMARY OSTEOARTHRITIS OF ONE KNEE, LEFT: Primary | ICD-10-CM

## 2024-05-22 DIAGNOSIS — M25.562 LEFT KNEE PAIN, UNSPECIFIED CHRONICITY: ICD-10-CM

## 2024-05-22 PROCEDURE — 73560 X-RAY EXAM OF KNEE 1 OR 2: CPT | Performed by: INTERNAL MEDICINE

## 2024-05-22 NOTE — TELEPHONE ENCOUNTER
Spoke to patient, full name and date of birth verified.   Informed of left knee xray order. Patient verbalized understanding, will call central scheduling for an appointment.     Patient reported the pain in her knee sometimes wakes her up at night. Patient not currently taking any medications. Recommended trying Tylenol at bedtime, patient agreed to give this a try.     Patient has no further questions.

## 2024-06-07 ENCOUNTER — HOSPITAL ENCOUNTER (EMERGENCY)
Facility: HOSPITAL | Age: 69
Discharge: HOME | End: 2024-06-07
Attending: EMERGENCY MEDICINE
Payer: COMMERCIAL

## 2024-06-07 ENCOUNTER — APPOINTMENT (OUTPATIENT)
Dept: MRI IMAGING | Facility: HOSPITAL | Age: 69
End: 2024-06-07
Attending: EMERGENCY MEDICINE
Payer: COMMERCIAL

## 2024-06-07 ENCOUNTER — TELEPHONE (OUTPATIENT)
Dept: OPHTHALMOLOGY | Facility: CLINIC | Age: 69
End: 2024-06-07

## 2024-06-07 VITALS
DIASTOLIC BLOOD PRESSURE: 70 MMHG | OXYGEN SATURATION: 98 % | HEART RATE: 72 BPM | BODY MASS INDEX: 47.37 KG/M2 | HEIGHT: 59 IN | RESPIRATION RATE: 20 BRPM | TEMPERATURE: 99 F | WEIGHT: 235 LBS | SYSTOLIC BLOOD PRESSURE: 126 MMHG

## 2024-06-07 DIAGNOSIS — G51.0 BELL'S PALSY: Primary | ICD-10-CM

## 2024-06-07 LAB
ANION GAP SERPL CALC-SCNC: 8 MMOL/L (ref 0–18)
BASOPHILS # BLD AUTO: 0.05 X10(3) UL (ref 0–0.2)
BASOPHILS NFR BLD AUTO: 0.5 %
BUN BLD-MCNC: 25 MG/DL (ref 9–23)
BUN/CREAT SERPL: 28.4 (ref 10–20)
CALCIUM BLD-MCNC: 9.6 MG/DL (ref 8.7–10.4)
CHLORIDE SERPL-SCNC: 102 MMOL/L (ref 98–112)
CO2 SERPL-SCNC: 31 MMOL/L (ref 21–32)
CREAT BLD-MCNC: 0.88 MG/DL
DEPRECATED RDW RBC AUTO: 44.3 FL (ref 35.1–46.3)
EGFRCR SERPLBLD CKD-EPI 2021: 72 ML/MIN/1.73M2 (ref 60–?)
EOSINOPHIL # BLD AUTO: 0.14 X10(3) UL (ref 0–0.7)
EOSINOPHIL NFR BLD AUTO: 1.4 %
ERYTHROCYTE [DISTWIDTH] IN BLOOD BY AUTOMATED COUNT: 13.2 % (ref 11–15)
GLUCOSE BLD-MCNC: 96 MG/DL (ref 70–99)
GLUCOSE BLDC GLUCOMTR-MCNC: 96 MG/DL (ref 70–99)
HCT VFR BLD AUTO: 42 %
HGB BLD-MCNC: 14.3 G/DL
IMM GRANULOCYTES # BLD AUTO: 0.04 X10(3) UL (ref 0–1)
IMM GRANULOCYTES NFR BLD: 0.4 %
LYMPHOCYTES # BLD AUTO: 1.96 X10(3) UL (ref 1–4)
LYMPHOCYTES NFR BLD AUTO: 19.9 %
MCH RBC QN AUTO: 31.2 PG (ref 26–34)
MCHC RBC AUTO-ENTMCNC: 34 G/DL (ref 31–37)
MCV RBC AUTO: 91.5 FL
MONOCYTES # BLD AUTO: 0.66 X10(3) UL (ref 0.1–1)
MONOCYTES NFR BLD AUTO: 6.7 %
NEUTROPHILS # BLD AUTO: 7 X10 (3) UL (ref 1.5–7.7)
NEUTROPHILS # BLD AUTO: 7 X10(3) UL (ref 1.5–7.7)
NEUTROPHILS NFR BLD AUTO: 71.1 %
OSMOLALITY SERPL CALC.SUM OF ELEC: 296 MOSM/KG (ref 275–295)
PLATELET # BLD AUTO: 274 10(3)UL (ref 150–450)
POTASSIUM SERPL-SCNC: 3 MMOL/L (ref 3.5–5.1)
RBC # BLD AUTO: 4.59 X10(6)UL
SODIUM SERPL-SCNC: 141 MMOL/L (ref 136–145)
WBC # BLD AUTO: 9.9 X10(3) UL (ref 4–11)

## 2024-06-07 PROCEDURE — 36415 COLL VENOUS BLD VENIPUNCTURE: CPT

## 2024-06-07 PROCEDURE — 80048 BASIC METABOLIC PNL TOTAL CA: CPT | Performed by: EMERGENCY MEDICINE

## 2024-06-07 PROCEDURE — 99284 EMERGENCY DEPT VISIT MOD MDM: CPT

## 2024-06-07 PROCEDURE — 85025 COMPLETE CBC W/AUTO DIFF WBC: CPT | Performed by: EMERGENCY MEDICINE

## 2024-06-07 PROCEDURE — 82962 GLUCOSE BLOOD TEST: CPT

## 2024-06-07 PROCEDURE — 70551 MRI BRAIN STEM W/O DYE: CPT | Performed by: EMERGENCY MEDICINE

## 2024-06-07 RX ORDER — ERYTHROMYCIN 5 MG/G
1 OINTMENT OPHTHALMIC EVERY 6 HOURS
Qty: 3.5 G | Refills: 0 | Status: SHIPPED | OUTPATIENT
Start: 2024-06-07 | End: 2024-06-14

## 2024-06-07 RX ORDER — PREDNISONE 20 MG/1
40 TABLET ORAL DAILY
Qty: 10 TABLET | Refills: 0 | Status: SHIPPED | OUTPATIENT
Start: 2024-06-07 | End: 2024-06-12

## 2024-06-07 RX ORDER — VALACYCLOVIR HYDROCHLORIDE 1 G/1
1000 TABLET, FILM COATED ORAL 3 TIMES DAILY
Qty: 21 TABLET | Refills: 0 | Status: SHIPPED | OUTPATIENT
Start: 2024-06-07 | End: 2024-06-14

## 2024-06-07 RX ORDER — WATER 10 ML/10ML
INJECTION INTRAMUSCULAR; INTRAVENOUS; SUBCUTANEOUS
Status: COMPLETED
Start: 2024-06-07 | End: 2024-06-07

## 2024-06-07 NOTE — TELEPHONE ENCOUNTER
Called patient back wanted to be seen today I advised her we don't have a doctor in the office. I gave her the number for [place] [facility] [place].

## 2024-06-07 NOTE — TELEPHONE ENCOUNTER
Patient states to feel a lot of pressure in their right eye. Patient states their pupil to their eye has also moved over closer to their nose and they are not able to see out of that eye at all and their eye keep watering. Patient states this has been going on 2-3 days and has been progressively getting worse. Please advise.
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
## 2024-06-07 NOTE — ED INITIAL ASSESSMENT (HPI)
Pt presents to ED with complaint of blurred vision x 3 days. Pt reports she has a new glasses prescription and is unsure if this is contributing.   Pt denies HA, n/v, numbness/tingling  Negative BEFAST in triage

## 2024-06-10 ENCOUNTER — TELEPHONE (OUTPATIENT)
Dept: CASE MANAGEMENT | Age: 69
End: 2024-06-10

## 2024-06-10 DIAGNOSIS — G51.0 BELL'S PALSY: Primary | ICD-10-CM

## 2024-06-10 NOTE — TELEPHONE ENCOUNTER
Dr. Pineda,     Patient is at Dr. Shadi Collins's office for [place], post ER visit, and needs a referral.    Pended referral please review diagnosis and sign off if you agree.    Thank you.  Soumya Sigala  [facility]

## 2024-06-13 ENCOUNTER — PATIENT OUTREACH (OUTPATIENT)
Dept: CASE MANAGEMENT | Age: 69
End: 2024-06-13

## 2024-06-13 NOTE — PROGRESS NOTES
1st attempt ED f/up appt request :    PCP - Monday 6/17 @1:15pm w/ Dr. Palacios    Opthalmology - Decline - pt states that she will wait until after her PCP appt on Monday    No further assistance needed.      Closing encounter

## 2024-06-17 ENCOUNTER — OFFICE VISIT (OUTPATIENT)
Dept: INTERNAL MEDICINE CLINIC | Facility: CLINIC | Age: 69
End: 2024-06-17
Payer: COMMERCIAL

## 2024-06-17 VITALS
SYSTOLIC BLOOD PRESSURE: 126 MMHG | DIASTOLIC BLOOD PRESSURE: 80 MMHG | HEIGHT: 59 IN | BODY MASS INDEX: 44.45 KG/M2 | TEMPERATURE: 98 F | OXYGEN SATURATION: 96 % | WEIGHT: 220.5 LBS | HEART RATE: 99 BPM

## 2024-06-17 DIAGNOSIS — G51.0 BELL'S PALSY: Primary | ICD-10-CM

## 2024-06-17 PROCEDURE — 3079F DIAST BP 80-89 MM HG: CPT | Performed by: INTERNAL MEDICINE

## 2024-06-17 PROCEDURE — 3074F SYST BP LT 130 MM HG: CPT | Performed by: INTERNAL MEDICINE

## 2024-06-17 PROCEDURE — 99213 OFFICE O/P EST LOW 20 MIN: CPT | Performed by: INTERNAL MEDICINE

## 2024-06-17 PROCEDURE — 3008F BODY MASS INDEX DOCD: CPT | Performed by: INTERNAL MEDICINE
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
## 2024-06-20 ENCOUNTER — MED REC SCAN ONLY (OUTPATIENT)
Dept: INTERNAL MEDICINE CLINIC | Facility: CLINIC | Age: 69
End: 2024-06-20

## 2024-07-18 ENCOUNTER — TELEPHONE (OUTPATIENT)
Dept: ENDOCRINOLOGY CLINIC | Facility: CLINIC | Age: 69
End: 2024-07-18

## 2024-07-18 RX ORDER — ROSUVASTATIN CALCIUM 20 MG/1
20 TABLET, COATED ORAL DAILY
Qty: 90 TABLET | Refills: 3 | Status: SHIPPED | OUTPATIENT
Start: 2024-07-18

## 2024-07-18 NOTE — TELEPHONE ENCOUNTER
Current Outpatient Medications   Medication Sig Dispense Refill                                              levothyroxine 137 MCG Oral Tab Take 137 mcg by mouth before breakfast. 90 tablet 0

## 2024-07-19 NOTE — TELEPHONE ENCOUNTER
Refill passed per [facility] protocol.    Requested Prescriptions   Pending Prescriptions Disp Refills    ROSUVASTATIN 20 MG Oral Tab [Pharmacy Med Name: ROSUVASTATIN 20MG TABLETS] 90 tablet 1     Sig: TAKE 1 TABLET(20 MG) BY MOUTH DAILY       Cholesterol Medication Protocol Passed - 7/16/2024  1:11 PM        Passed - ALT < 80     Lab Results   Component Value Date    ALT 21 07/23/2023             Passed - ALT resulted within past year        Passed - Lipid panel within past 12 months     Lab Results   Component Value Date    CHOLEST 157 10/24/2023    TRIG 178 (H) 10/24/2023    HDL 45 10/24/2023    LDL 82 10/24/2023    VLDL 28 10/24/2023    NONHDLC 112 10/24/2023             Passed - In person appointment or virtual visit in the past 12 mos or appointment in next 3 mos     Recent Outpatient Visits              1 month ago Bell's palsy    [facility] Farhan Pineda MD    Office Visit    2 months ago Other psoriasis    [facility] Shannon Fernández MD    Office Visit    2 months ago Type 2 diabetes mellitus with other specified complication, without long-term current use of insulin ([facility])    [facility] Georgette Rodriguez MD    Office Visit    3 months ago Cervicalgia    [facility] [place] Lore Quintana, PT    Office Visit    3 months ago Cervicalgia    [facility]  [facility] [place], [place]    Office Visit          Future Appointments         Provider Department Appt Notes    In 2 months Max Mejia MD [facility] 2 mon f/u                       Future Appointments         Provider Department Appt Notes    In 2 months Max Mejia MD [facility] 2 mon f/u          Recent Outpatient Visits              1 month ago Bell's palsy    [facility]  [facility] [place] Farhan Pineda MD    Office Visit    2 months ago Other psoriasis    [facility], [facility], Shannon Liu MD    Office Visit    2 months ago Type 2 diabetes mellitus with other specified complication, without long-term current use of insulin ([facility])    [facility], Georgette Siddiqui MD    Office Visit    3 months ago Cervicalgia    [place]  [facility] [place], [place]    Office Visit    3 months ago Cervicalgia    [place]  [facility] [place], [place]    Office Visit

## 2024-07-22 RX ORDER — LEVOTHYROXINE SODIUM 137 UG/1
137 TABLET ORAL
Qty: 90 TABLET | Refills: 1 | Status: SHIPPED | OUTPATIENT
Start: 2024-07-22

## 2024-07-22 NOTE — TELEPHONE ENCOUNTER
Endocrine refill protocol for medications for hypothyroidism and hyperthyroidism    Protocol Criteria: Levothyroxine 137 mcg, PASSED. RX sent.  Appointment with Endocrinology completed in the last 12 months or scheduled in the next 6 months     Verify appointment has been completed or scheduled in the appropriate timeline. If so can send a 90 day supply with 1 refill per provider protocol.    Normal TSH result in the past 12 months   Review recent telephone encounters and mychart communications with patient to ensure a dose change has not occurred since last office visit that was not updated in the medication history list   Last completed office visit:4/22/2024 Georgette Rodriguez MD   Next scheduled Follow up: no follow up scheduled   Last TSH result:   Component      Latest Ref Rng 12/19/2023   TSH      0.550 - 4.780 mIU/mL 2.371

## 2024-08-01 ENCOUNTER — NURSE TRIAGE (OUTPATIENT)
Dept: INTERNAL MEDICINE CLINIC | Facility: CLINIC | Age: 69
End: 2024-08-01

## 2024-08-01 NOTE — TELEPHONE ENCOUNTER
Please reply to pool: EM RN TRIAGE  Action Requested: Summary for Provider     []  Critical Lab, Recommendations Needed  [x] Need Additional Advice  []   FYI    [x]   Need Orders  [] Need Medications Sent to Pharmacy  []  Other     SUMMARY: patient contacts clinic reporting generalized fatigue.  Inquires if her thyroid may be off.  Denies pain, fever, dizziness, chest pain shortness of breath or other symptoms.  Will be traveling at the end of the month, no acute visits to offer prior.  Inquires if she can have her blood levels checked and she can follow up upon her return.  Dr. Pineda please advise.    Reason for call: Fatigue  Onset: Data Unavailable                       Reason for Disposition   Fatigue (i.e., tires easily, decreased energy) and persists > 1 week    Protocols used: Weakness (Generalized) and Fatigue-A-OH

## 2024-08-05 ENCOUNTER — OFFICE VISIT (OUTPATIENT)
Dept: INTERNAL MEDICINE CLINIC | Facility: CLINIC | Age: 69
End: 2024-08-05

## 2024-08-05 VITALS
DIASTOLIC BLOOD PRESSURE: 80 MMHG | OXYGEN SATURATION: 96 % | HEIGHT: 59 IN | TEMPERATURE: 98 F | HEART RATE: 89 BPM | SYSTOLIC BLOOD PRESSURE: 128 MMHG | WEIGHT: 218.81 LBS | BODY MASS INDEX: 44.11 KG/M2

## 2024-08-05 DIAGNOSIS — Z00.00 ANNUAL PHYSICAL EXAM: Primary | ICD-10-CM

## 2024-08-05 DIAGNOSIS — E78.00 PURE HYPERCHOLESTEROLEMIA: ICD-10-CM

## 2024-08-05 DIAGNOSIS — E11.9 TYPE 2 DIABETES MELLITUS WITHOUT RETINOPATHY (HCC): ICD-10-CM

## 2024-08-05 DIAGNOSIS — I10 ESSENTIAL HYPERTENSION: ICD-10-CM

## 2024-08-05 DIAGNOSIS — G47.33 OSA (OBSTRUCTIVE SLEEP APNEA): ICD-10-CM

## 2024-08-05 DIAGNOSIS — I25.10 CORONARY ARTERY DISEASE INVOLVING NATIVE CORONARY ARTERY OF NATIVE HEART WITHOUT ANGINA PECTORIS: ICD-10-CM

## 2024-08-05 DIAGNOSIS — E03.9 ACQUIRED HYPOTHYROIDISM: ICD-10-CM

## 2024-08-05 DIAGNOSIS — J44.9 CHRONIC OBSTRUCTIVE PULMONARY DISEASE, UNSPECIFIED COPD TYPE (HCC): ICD-10-CM

## 2024-08-05 DIAGNOSIS — G51.0 BELL'S PALSY: ICD-10-CM

## 2024-08-05 PROCEDURE — 3044F HG A1C LEVEL LT 7.0%: CPT | Performed by: INTERNAL MEDICINE

## 2024-08-05 PROCEDURE — 3079F DIAST BP 80-89 MM HG: CPT | Performed by: INTERNAL MEDICINE

## 2024-08-05 PROCEDURE — 3008F BODY MASS INDEX DOCD: CPT | Performed by: INTERNAL MEDICINE

## 2024-08-05 PROCEDURE — 99397 PER PM REEVAL EST PAT 65+ YR: CPT | Performed by: INTERNAL MEDICINE

## 2024-08-05 PROCEDURE — 3074F SYST BP LT 130 MM HG: CPT | Performed by: INTERNAL MEDICINE

## 2024-08-05 RX ORDER — AMOXICILLIN AND CLAVULANATE POTASSIUM 875; 125 MG/1; MG/1
1 TABLET, FILM COATED ORAL 2 TIMES DAILY
Qty: 14 TABLET | Refills: 0 | Status: SHIPPED | OUTPATIENT
Start: 2024-08-05 | End: 2024-08-12

## 2024-08-05 RX ORDER — FLUOCINOLONE ACETONIDE 0.11 MG/ML
OIL TOPICAL
Qty: 118 ML | Refills: 2 | Status: SHIPPED | OUTPATIENT
Start: 2024-08-05

## 2024-08-05 RX ORDER — PHENTERMINE HYDROCHLORIDE 37.5 MG/1
TABLET ORAL
COMMUNITY
Start: 2024-07-16
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
## 2024-08-06 ENCOUNTER — LAB ENCOUNTER (OUTPATIENT)
Dept: LAB | Facility: HOSPITAL | Age: 69
End: 2024-08-06
Attending: INTERNAL MEDICINE
Payer: COMMERCIAL

## 2024-08-06 DIAGNOSIS — Z00.00 ANNUAL PHYSICAL EXAM: ICD-10-CM

## 2024-08-06 DIAGNOSIS — E03.9 ACQUIRED HYPOTHYROIDISM: ICD-10-CM

## 2024-08-06 LAB
ALBUMIN SERPL-MCNC: 4.4 G/DL (ref 3.2–4.8)
ALBUMIN/GLOB SERPL: 1.5 {RATIO} (ref 1–2)
ALP LIVER SERPL-CCNC: 76 U/L
ALT SERPL-CCNC: 13 U/L
ANION GAP SERPL CALC-SCNC: 8 MMOL/L (ref 0–18)
AST SERPL-CCNC: 19 U/L (ref ?–34)
BASOPHILS # BLD AUTO: 0.04 X10(3) UL (ref 0–0.2)
BASOPHILS NFR BLD AUTO: 0.4 %
BILIRUB SERPL-MCNC: 1.3 MG/DL (ref 0.2–1.1)
BUN BLD-MCNC: 18 MG/DL (ref 9–23)
BUN/CREAT SERPL: 21.2 (ref 10–20)
CALCIUM BLD-MCNC: 9.4 MG/DL (ref 8.7–10.4)
CHLORIDE SERPL-SCNC: 107 MMOL/L (ref 98–112)
CHOLEST SERPL-MCNC: 179 MG/DL (ref ?–200)
CO2 SERPL-SCNC: 28 MMOL/L (ref 21–32)
CREAT BLD-MCNC: 0.85 MG/DL
CREAT UR-SCNC: 149.1 MG/DL
DEPRECATED RDW RBC AUTO: 43.1 FL (ref 35.1–46.3)
EGFRCR SERPLBLD CKD-EPI 2021: 76 ML/MIN/1.73M2 (ref 60–?)
EOSINOPHIL # BLD AUTO: 0.19 X10(3) UL (ref 0–0.7)
EOSINOPHIL NFR BLD AUTO: 2 %
ERYTHROCYTE [DISTWIDTH] IN BLOOD BY AUTOMATED COUNT: 13.1 % (ref 11–15)
EST. AVERAGE GLUCOSE BLD GHB EST-MCNC: 131 MG/DL (ref 68–126)
FASTING PATIENT LIPID ANSWER: YES
FASTING STATUS PATIENT QL REPORTED: YES
GLOBULIN PLAS-MCNC: 2.9 G/DL (ref 2–3.5)
GLUCOSE BLD-MCNC: 114 MG/DL (ref 70–99)
HBA1C MFR BLD: 6.2 % (ref ?–5.7)
HCT VFR BLD AUTO: 42.2 %
HDLC SERPL-MCNC: 42 MG/DL (ref 40–59)
HGB BLD-MCNC: 14.4 G/DL
IMM GRANULOCYTES # BLD AUTO: 0.04 X10(3) UL (ref 0–1)
IMM GRANULOCYTES NFR BLD: 0.4 %
LDLC SERPL CALC-MCNC: 111 MG/DL (ref ?–100)
LYMPHOCYTES # BLD AUTO: 2.02 X10(3) UL (ref 1–4)
LYMPHOCYTES NFR BLD AUTO: 20.9 %
MCH RBC QN AUTO: 30.5 PG (ref 26–34)
MCHC RBC AUTO-ENTMCNC: 34.1 G/DL (ref 31–37)
MCV RBC AUTO: 89.4 FL
MICROALBUMIN UR-MCNC: 0.9 MG/DL
MICROALBUMIN/CREAT 24H UR-RTO: 6 UG/MG (ref ?–30)
MONOCYTES # BLD AUTO: 0.58 X10(3) UL (ref 0.1–1)
MONOCYTES NFR BLD AUTO: 6 %
NEUTROPHILS # BLD AUTO: 6.81 X10 (3) UL (ref 1.5–7.7)
NEUTROPHILS # BLD AUTO: 6.81 X10(3) UL (ref 1.5–7.7)
NEUTROPHILS NFR BLD AUTO: 70.3 %
NONHDLC SERPL-MCNC: 137 MG/DL (ref ?–130)
OSMOLALITY SERPL CALC.SUM OF ELEC: 299 MOSM/KG (ref 275–295)
PLATELET # BLD AUTO: 280 10(3)UL (ref 150–450)
POTASSIUM SERPL-SCNC: 3.7 MMOL/L (ref 3.5–5.1)
PROT SERPL-MCNC: 7.3 G/DL (ref 5.7–8.2)
RBC # BLD AUTO: 4.72 X10(6)UL
SODIUM SERPL-SCNC: 143 MMOL/L (ref 136–145)
T3FREE SERPL-MCNC: 2.66 PG/ML (ref 2.4–4.2)
T4 FREE SERPL-MCNC: 1.6 NG/DL (ref 0.8–1.7)
TRIGL SERPL-MCNC: 146 MG/DL (ref 30–149)
TSI SER-ACNC: 0.23 MIU/ML (ref 0.55–4.78)
VLDLC SERPL CALC-MCNC: 25 MG/DL (ref 0–30)
WBC # BLD AUTO: 9.7 X10(3) UL (ref 4–11)

## 2024-08-06 PROCEDURE — 84439 ASSAY OF FREE THYROXINE: CPT

## 2024-08-06 PROCEDURE — 36415 COLL VENOUS BLD VENIPUNCTURE: CPT

## 2024-08-06 PROCEDURE — 85025 COMPLETE CBC W/AUTO DIFF WBC: CPT

## 2024-08-06 PROCEDURE — 82043 UR ALBUMIN QUANTITATIVE: CPT

## 2024-08-06 PROCEDURE — 80053 COMPREHEN METABOLIC PANEL: CPT

## 2024-08-06 PROCEDURE — 83036 HEMOGLOBIN GLYCOSYLATED A1C: CPT

## 2024-08-06 PROCEDURE — 80061 LIPID PANEL: CPT

## 2024-08-06 PROCEDURE — 84443 ASSAY THYROID STIM HORMONE: CPT

## 2024-08-06 PROCEDURE — 84481 FREE ASSAY (FT-3): CPT

## 2024-08-06 PROCEDURE — 82570 ASSAY OF URINE CREATININE: CPT

## 2024-08-09 ENCOUNTER — TELEPHONE (OUTPATIENT)
Dept: INTERNAL MEDICINE CLINIC | Facility: CLINIC | Age: 69
End: 2024-08-09

## 2024-08-09 RX ORDER — LEVOTHYROXINE SODIUM 0.12 MG/1
125 TABLET ORAL
Qty: 90 TABLET | Refills: 0 | Status: SHIPPED | OUTPATIENT
Start: 2024-08-09

## 2024-09-17 RX ORDER — TIRZEPATIDE 15 MG/.5ML
15 INJECTION, SOLUTION SUBCUTANEOUS WEEKLY
Qty: 6 ML | Refills: 0 | Status: SHIPPED | OUTPATIENT
Start: 2024-09-17

## 2024-09-17 NOTE — TELEPHONE ENCOUNTER
Tirzepatide (MOUNJARO) 15 MG/0.5ML Subcutaneous Solution Pen-injector, Inject 15 mg into the skin once a week., Disp: 6 mL, Rfl: 0

## 2024-09-17 NOTE — TELEPHONE ENCOUNTER
Endocrine Refill protocol for oral and injectable diabetic medications    Protocol Criteria:  PASSED  Reason: N/A  -Appointment with Endocrinology completed in the last 6 months or scheduled in the next 3 months    -A1c result below 8.5% in the past 6 months      Verify the above has been completed or scheduled in the appropriate timeline. If so can send a 90 day supply with 1 refill.     Last completed office visit: 4/22/2024 Georgette Rodriguez MD   Next scheduled Follow up: No future appointments. [facility] sent  Last A1c result: Last A1c value was 6.2% done 8/6/2024.

## 2024-09-17 NOTE — TELEPHONE ENCOUNTER
Patient Name:  Laxmi Castro MRN:  [id number]   :  1941  Age:  68 y.o. Sex: female   Ordering Provider: Ivelisse Falcon MD  Referring Provider: Rain Melendrez DO  Primary Care Provider:  Rain Melendrez DO     Indications: WOODS and abnormal EKG     Medications:     Current Outpatient Medications:     warfarin (COUMADIN) 2 MG tablet, Take 2 mg by mouth As directed, Disp: , Rfl:     glipiZIDE (GLUCOTROL XL) 5 MG extended release tablet, , Disp: , Rfl:     carvedilol (COREG) 6.25 MG tablet, Take by mouth 2 times daily , Disp: , Rfl:     digoxin (LANOXIN) 250 MCG tablet, Take 250 mcg by mouth daily \"5 times a week, skipping 2 days\", Disp: , Rfl:     famotidine (PEPCID) 20 MG tablet, Take 20 mg by mouth 2 times daily , Disp: , Rfl:     furosemide (LASIX) 20 MG tablet, Take 20 mg by mouth daily , Disp: , Rfl:     metFORMIN (GLUCOPHAGE) 1000 MG tablet, Take 1,000 mg by mouth 2 times daily (with meals) , Disp: , Rfl:     spironolactone (ALDACTONE) 25 MG tablet, Take 25 mg by mouth daily , Disp: , Rfl:     warfarin (COUMADIN) 2.5 MG tablet, Take 2.5 mg by mouth daily \"6 days a week\", Disp: , Rfl:     Current Facility-Administered Medications:     regadenoson (LEXISCAN) injection 0.4 mg, 0.4 mg, Intravenous, Once, Miguel Ángel Bryant DO    Facility-Administered Medications Ordered in Other Encounters:     technetium sestamibi (CARDIOLITE) injection 30 millicurie, 30 millicurie, Intravenous, ONCE PRN, Ivelisse Falcon MD    technetium sestamibi (CARDIOLITE) injection 10 millicurie, 10 millicurie, Intravenous, ONCE PRN, Ivelisse Falcon MD      ----------------------------------------------------------------------------------------------------------                Cas Rosario 0.4 mg injected over 10 seconds. IV Cardiolite injected 20 seconds post Lexiscan injection.      Heart Rate:  85  Resting Blood Pressure:  142/76 Please call and book apt if none made by 9/24/2024 Thanks   ----------------------------------------------------------------------------------------------------------     HR BP   1 min 96 131/74   2 min     3 min 85 147/77   4 min     5 min 84 144/70   6 min     7 min 83 137/68   8 min     9 min 88 147/94   10 min       Symptoms:  Chest Pain:  No      Nausea:  No     Headache:  No    Shortness of Breath:  Yes     Other:  No      Electronically signed by Gertrude Winkler RCP on 4/8/21 at 9:21 AM EDT    ----------------------------------------------------------------------------------------------------------  Resting EKG:  Afib, prolong qt    Arrhythmias:  none     EKG Changes:  none     Interpretation:    - Negative ECG Portion of Lexiscan Stress Test  - Await Nuclear Portion    Supervising Physician:    [facility] [facility]  [facility] [employer]  [phone]

## 2024-09-23 ENCOUNTER — TELEPHONE (OUTPATIENT)
Dept: ENDOCRINOLOGY CLINIC | Facility: CLINIC | Age: 69
End: 2024-09-23

## 2024-09-23 ENCOUNTER — OFFICE VISIT (OUTPATIENT)
Dept: INTERNAL MEDICINE CLINIC | Facility: CLINIC | Age: 69
End: 2024-09-23
Payer: COMMERCIAL

## 2024-09-23 VITALS
OXYGEN SATURATION: 98 % | WEIGHT: 225 LBS | BODY MASS INDEX: 45.36 KG/M2 | HEIGHT: 59 IN | SYSTOLIC BLOOD PRESSURE: 144 MMHG | HEART RATE: 79 BPM | TEMPERATURE: 99 F | DIASTOLIC BLOOD PRESSURE: 84 MMHG

## 2024-09-23 DIAGNOSIS — J01.90 ACUTE BACTERIAL SINUSITIS: Primary | ICD-10-CM

## 2024-09-23 DIAGNOSIS — B96.89 ACUTE BACTERIAL SINUSITIS: Primary | ICD-10-CM

## 2024-09-23 PROCEDURE — 3061F NEG MICROALBUMINURIA REV: CPT | Performed by: STUDENT IN AN ORGANIZED HEALTH CARE EDUCATION/TRAINING PROGRAM

## 2024-09-23 PROCEDURE — 3008F BODY MASS INDEX DOCD: CPT | Performed by: STUDENT IN AN ORGANIZED HEALTH CARE EDUCATION/TRAINING PROGRAM

## 2024-09-23 PROCEDURE — 99213 OFFICE O/P EST LOW 20 MIN: CPT | Performed by: STUDENT IN AN ORGANIZED HEALTH CARE EDUCATION/TRAINING PROGRAM

## 2024-09-23 PROCEDURE — 3077F SYST BP >= 140 MM HG: CPT | Performed by: STUDENT IN AN ORGANIZED HEALTH CARE EDUCATION/TRAINING PROGRAM

## 2024-09-23 PROCEDURE — 3079F DIAST BP 80-89 MM HG: CPT | Performed by: STUDENT IN AN ORGANIZED HEALTH CARE EDUCATION/TRAINING PROGRAM

## 2024-09-23 PROCEDURE — 3044F HG A1C LEVEL LT 7.0%: CPT | Performed by: STUDENT IN AN ORGANIZED HEALTH CARE EDUCATION/TRAINING PROGRAM

## 2024-09-23 RX ORDER — METHYLPREDNISOLONE 4 MG
TABLET, DOSE PACK ORAL
Qty: 1 EACH | Refills: 0 | Status: SHIPPED | OUTPATIENT
Start: 2024-09-23

## 2024-09-23 RX ORDER — FLUTICASONE PROPIONATE 50 MCG
2 SPRAY, SUSPENSION (ML) NASAL DAILY
Qty: 1 EACH | Refills: 11 | Status: SHIPPED | OUTPATIENT
Start: 2024-09-23

## 2024-09-23 RX ORDER — AZELASTINE HYDROCHLORIDE 137 UG/1
1-2 SPRAY, METERED NASAL 2 TIMES DAILY
Qty: 30 ML | Refills: 11 | Status: SHIPPED | OUTPATIENT
Start: 2024-09-23

## 2024-09-23 NOTE — TELEPHONE ENCOUNTER
Current Outpatient Medications   Medication Sig Dispense Refill                                Tirzepatide (MOUNJARO) 15 MG/0.5ML Subcutaneous Solution Pen-injector Inject 15 mg into the skin once a week. (Patient not taking: Reported on 9/23/2024) 6 mL 0   KEY: [id number]

## 2024-09-23 NOTE — PROGRESS NOTES
OFFICE NOTE     Patient ID: Jing Irving is a 68 year old female.  Today's Date: 09/23/24  Chief Complaint: Sore Throat (2 weeks sore throat and coughing at night time)      Pt is a 69y/o female with PMHx of Class 3 obesity, COPD, HTN, CAD, HLD, Hypothyroidism, Obesity, ROGELIO, OA of right hand, catracts whom presents to clinic after ongoing URI/sinus congestion for past 2 weeks  Sinus Problem  This is a new problem. The current episode started 1 to 4 weeks ago. The problem has been gradually worsening since onset. The maximum temperature recorded prior to her arrival was 100.4 - 100.9 F. The fever has been present for Less than 1 day. The pain is mild. Associated symptoms include chills, congestion, coughing, headaches, a hoarse voice, sinus pressure, sneezing and a sore throat. Pertinent negatives include no shortness of breath.         Vitals:    09/23/24 0900   BP: 144/84   Pulse: 79   Temp: 98.5 °F (36.9 °C)   TempSrc: Oral   SpO2: 98%   Weight: 225 lb (102.1 kg)   Height: 4' 11\" (1.499 m)     body mass index is 45.44 kg/m².  BP Readings from Last 3 Encounters:   09/23/24 144/84   08/05/24 128/80   06/17/24 126/80     The 10-year ASCVD risk score (Mindy TAI, et al., 2019) is: 20%    Values used to calculate the score:      Age: 68 years      Sex: Female      Is Non- : No      Diabetic: Yes      Tobacco smoker: No      Systolic Blood Pressure: 144 mmHg      Is BP treated: Yes      HDL Cholesterol: 42 mg/dL      Total Cholesterol: 179 mg/dL      Medications reviewed:  Current Outpatient Medications   Medication Sig Dispense Refill    amoxicillin clavulanate 875-125 MG Oral Tab Take 1 tablet by mouth 2 (two) times daily for 10 days. 20 tablet 0    azelastine 137 MCG/SPRAY Nasal Solution 1-2 sprays by Nasal route in the morning and 1-2 sprays before bedtime. FOR SINUS SYMPTOMS/NASAL CONGESTION.. 30 mL 11    fluticasone propionate 50 MCG/ACT Nasal Suspension 2 sprays by Each Nare  route daily. FOR NASAL CONGESTION/SINUS SYMPTOMS. 1 each 11    methylPREDNISolone 4 MG Oral Tablet Therapy Pack Take as directed with food. 1 each 0    levothyroxine 125 MCG Oral Tab Take 1 tablet (125 mcg total) by mouth before breakfast. 90 tablet 0    Phentermine HCl 37.5 MG Oral Tab       Fluocinolone Acetonide Scalp (DERMA-SMOOTHE/FS SCALP) 0.01 % External Oil Use qhs as directed for scalp psoriasis 118 mL 2    rosuvastatin 20 MG Oral Tab Take 1 tablet (20 mg total) by mouth daily. 90 tablet 3    metoprolol succinate ER 25 MG Oral Tablet 24 Hr Take 1 tablet (25 mg total) by mouth daily. 90 tablet 3    lisinopril-hydroCHLOROthiazide 20-25 MG Oral Tab Take 1 tablet by mouth daily. 90 tablet 1    albuterol 108 (90 Base) MCG/ACT Inhalation Aero Soln Inhale 1 puff into the lungs every 6 (six) hours as needed for Wheezing. 1 each 0    Budesonide-Formoterol Fumarate (SYMBICORT) 160-4.5 MCG/ACT Inhalation Aerosol Inhale 2 puffs into the lungs 2 (two) times daily. 1 each 5    Tirzepatide (MOUNJARO) 15 MG/0.5ML Subcutaneous Solution Pen-injector Inject 15 mg into the skin once a week. (Patient not taking: Reported on 9/23/2024) 6 mL 0    levothyroxine 137 MCG Oral Tab Take 137 mcg by mouth before breakfast. (Patient not taking: Reported on 9/23/2024) 90 tablet 1    triamcinolone 0.5 % External Cream Use bid as directed (Patient not taking: Reported on 6/17/2024) 60 g 1    Apremilast (OTEZLA) 10 & 20 & 30 MG Oral Tablet Therapy Pack Take as directed on pack (Patient not taking: Reported on 6/17/2024) 55 each 0         Assessment & Plan    1. Acute bacterial sinusitis (Primary)  -     Amoxicillin-Pot Clavulanate; Take 1 tablet by mouth 2 (two) times daily for 10 days.  Dispense: 20 tablet; Refill: 0  -     Azelastine HCl; 1-2 sprays by Nasal route in the morning and 1-2 sprays before bedtime. FOR SINUS SYMPTOMS/NASAL CONGESTION..  Dispense: 30 mL; Refill: 11  -     Fluticasone Propionate; 2 sprays by Each Nare route daily.  FOR NASAL CONGESTION/SINUS SYMPTOMS.  Dispense: 1 each; Refill: 11  -     methylPREDNISolone; Take as directed with food.  Dispense: 1 each; Refill: 0  Patient presenting URI and now sinus congestion tenderness and erythematous nasal turbinates consistent with acute secondary acute bacterial rhinosinusitis.  Plan:  -Use Flonase and Azelastine 1 puff each nostril daily for next month or till symptom resolves   Augmentin 1 tab BID for total 10 day course , pt educated while taking antibiotics should also take OTC priobiotics daily and/or natural probiotics such as greek yogurt/Kefir to help prevent development of C.Diff.  -Start medrol dose pack for severe inflammation  -for sore throat, educated to purchase OTC benzocaine lozenges for laryngitis/pharyngitis as well  -take hot showers, drink tea and increase fluid intake   -If no improvement also able to follow up with myself follow up in 10-14 days if needs antibiotic duration and agent if warranted       Follow Up: As needed/if symptoms worsen or Return in about 2 weeks (around 10/7/2024), or if symptoms worsen or fail to improve..        Objective/ Results:   Physical Exam  Constitutional:       Appearance: She is well-developed.   HENT:      Nose: Congestion (bilateraly) and rhinorrhea present.      Right Turbinates: Enlarged and swollen.      Left Turbinates: Enlarged and swollen.   Cardiovascular:      Rate and Rhythm: Normal rate and regular rhythm.      Heart sounds: Normal heart sounds.   Pulmonary:      Effort: Pulmonary effort is normal.      Breath sounds: Normal breath sounds.   Abdominal:      General: Bowel sounds are normal.      Palpations: Abdomen is soft.   Skin:     General: Skin is warm and dry.   Neurological:      Mental Status: She is alert and oriented to person, place, and time.      Deep Tendon Reflexes: Reflexes are normal and symmetric.        Reviewed:    Patient Active Problem List    Diagnosis    Cervicalgia    Shoulder impingement  syndrome, right    Stress    Increased appetite    Age-related nuclear cataract of both eyes    Type 2 diabetes mellitus without retinopathy (HCC)    Encounter for therapeutic drug monitoring    Binge eating    ROGELIO (obstructive sleep apnea)    Morbid obesity with BMI of 45.0-49.9, adult (HCC)    Pain of right thumb    Right wrist pain    Ulnar impaction syndrome    Primary osteoarthritis of first carpometacarpal joint of right hand    Degenerative arthritis of finger    Ganglion of right hand    COPD (chronic obstructive pulmonary disease) (HCC)    Blepharitis    Dry eyes, bilateral    Hypothyroid    Essential hypertension    Obesity    Hypercholesterolemia      No Known Allergies     Social History     Socioeconomic History    Marital status:    Occupational History    Occupation: office   Tobacco Use    Smoking status: Former     Current packs/day: 0.00     Average packs/day: 0.5 packs/day for 7.0 years (3.5 ttl pk-yrs)     Types: Cigarettes     Start date: 1993     Quit date: 2000     Years since quittin.1     Passive exposure: Past    Smokeless tobacco: Never    Tobacco comments:     quit smoking     Vaping Use    Vaping status: Never Used   Substance and Sexual Activity    Alcohol use: Not Currently     Alcohol/week: 1.0 standard drink of alcohol     Types: 1 Glasses of wine per week     Comment: social - monthly    Drug use: No    Sexual activity: Yes     Partners: Male     Comment: none   Other Topics Concern    Grew up on a farm No    History of tanning Yes    Outdoor occupation No    Pt has a pacemaker No    Pt has a defibrillator No    Breast feeding No    Reaction to local anesthetic No    Exercise No      Review of Systems   Constitutional:  Positive for chills.   HENT:  Positive for congestion, hoarse voice, sinus pressure, sneezing and sore throat.    Respiratory:  Positive for cough. Negative for shortness of breath.    Cardiovascular: Negative.    Gastrointestinal: Negative.     Skin: Negative.    Neurological:  Positive for headaches.     All other systems negative unless otherwise stated in ROS or HPI above.       Israel Guadalupe MD  Internal Medicine       Call office with any questions or seek emergency care if necessary.   Patient understands and agrees to follow directions and advice.      ----------------------------------------- PATIENT INSTRUCTIONS-----------------------------------------     There are no Patient Instructions on file for this visit.      The 21st Century Cures Act makes medical notes available to patients in the interest of transparency.  However, please be advised that this is a medical document.  It is intended as a peer to peer communication.  It is written in medical language and may contain abbreviations or verbiage that are technical and unfamiliar.  It may appear blunt or direct.  Medical documents are intended to carry relevant information, facts as evident, and the clinical opinion of the practitioner.

## 2024-09-25 ENCOUNTER — EKG ENCOUNTER (OUTPATIENT)
Dept: LAB | Age: 69
End: 2024-09-25
Attending: INTERNAL MEDICINE
Payer: COMMERCIAL

## 2024-09-25 DIAGNOSIS — Z51.81 ENCOUNTER FOR THERAPEUTIC DRUG MONITORING: ICD-10-CM

## 2024-09-25 DIAGNOSIS — I10 ESSENTIAL HYPERTENSION: ICD-10-CM

## 2024-09-25 DIAGNOSIS — E11.9 TYPE 2 DIABETES MELLITUS WITHOUT RETINOPATHY (HCC): ICD-10-CM

## 2024-09-25 DIAGNOSIS — E66.01 MORBID OBESITY WITH BMI OF 45.0-49.9, ADULT (HCC): ICD-10-CM

## 2024-09-25 DIAGNOSIS — F43.9 STRESS: ICD-10-CM

## 2024-09-25 LAB
ATRIAL RATE: 89 BPM
P AXIS: 53 DEGREES
P-R INTERVAL: 164 MS
Q-T INTERVAL: 368 MS
QRS DURATION: 82 MS
QTC CALCULATION (BEZET): 447 MS
R AXIS: 44 DEGREES
T AXIS: 69 DEGREES
VENTRICULAR RATE: 89 BPM

## 2024-09-25 PROCEDURE — 93005 ELECTROCARDIOGRAM TRACING: CPT

## 2024-09-25 PROCEDURE — 93010 ELECTROCARDIOGRAM REPORT: CPT | Performed by: INTERNAL MEDICINE

## 2024-09-25 NOTE — TELEPHONE ENCOUNTER
Medication PA Requested:                   Tirzepatide (MOUNJARO) 15 MG/0.5ML Subcutaneous Solution Pen-injector                                        CoverMyMeds Used:  Key:  Quantity: 6 ml  Day Supply: 90  Sig: Inject 15 mg into the skin once a week.   DX Code:    E11.69                                 CPT code (if applicable):   Case Number/Pending Ref#:

## 2024-09-26 ENCOUNTER — OFFICE VISIT (OUTPATIENT)
Dept: SURGERY | Facility: CLINIC | Age: 69
End: 2024-09-26
Payer: COMMERCIAL

## 2024-09-26 VITALS
HEART RATE: 86 BPM | DIASTOLIC BLOOD PRESSURE: 100 MMHG | OXYGEN SATURATION: 98 % | WEIGHT: 224.81 LBS | HEIGHT: 59 IN | BODY MASS INDEX: 45.32 KG/M2 | SYSTOLIC BLOOD PRESSURE: 176 MMHG

## 2024-09-26 DIAGNOSIS — E11.9 TYPE 2 DIABETES MELLITUS WITHOUT RETINOPATHY (HCC): Primary | ICD-10-CM

## 2024-09-26 DIAGNOSIS — F43.9 STRESS: ICD-10-CM

## 2024-09-26 DIAGNOSIS — Z51.81 ENCOUNTER FOR THERAPEUTIC DRUG MONITORING: ICD-10-CM

## 2024-09-26 DIAGNOSIS — E66.01 MORBID OBESITY WITH BMI OF 45.0-49.9, ADULT (HCC): ICD-10-CM

## 2024-09-26 DIAGNOSIS — I10 ESSENTIAL HYPERTENSION: ICD-10-CM

## 2024-09-26 PROCEDURE — 3008F BODY MASS INDEX DOCD: CPT | Performed by: INTERNAL MEDICINE

## 2024-09-26 PROCEDURE — 99214 OFFICE O/P EST MOD 30 MIN: CPT | Performed by: INTERNAL MEDICINE

## 2024-09-26 PROCEDURE — 3077F SYST BP >= 140 MM HG: CPT | Performed by: INTERNAL MEDICINE

## 2024-09-26 PROCEDURE — 3080F DIAST BP >= 90 MM HG: CPT | Performed by: INTERNAL MEDICINE

## 2024-09-26 RX ORDER — PHENTERMINE HYDROCHLORIDE 37.5 MG/1
37.5 TABLET ORAL
Qty: 30 TABLET | Refills: 5 | Status: SHIPPED | OUTPATIENT
Start: 2024-09-26
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
## 2024-10-14 ENCOUNTER — PATIENT MESSAGE (OUTPATIENT)
Dept: INTERNAL MEDICINE CLINIC | Facility: CLINIC | Age: 69
End: 2024-10-14

## 2024-10-15 NOTE — TELEPHONE ENCOUNTER
Dr. Pineda, please see [employer] message and advise. Thanks.    Future Appointments   Date Time Provider Department Center   11/18/2024 10:00 AM Georgette Rodriguez MD [facility]   4/9/2025  3:45 PM Max Mejia MD [address]

## 2024-10-15 NOTE — TELEPHONE ENCOUNTER
We can refer her to Dr Roger brown;   Lasik is not a treatment for cataract.   Cataract is treated with lens extraction and replacement with another lens.

## 2024-10-16 NOTE — TELEPHONE ENCOUNTER
Dr Pineda's recommendation and DR Duran's information sent via [employer] .   Postponed to confirm patient has reviewed message.  Per chart, patient still has HMO as an active insurnace ==instruction sent .     Active Insurance as of 10/16/2024    [facility] [place]    Payor Plan Insurance Group Employer/Plan Group   [facility] [place] [id number]    Payor Plan Address Payor Plan Phone Number Payor Plan Fax Number Effective Dates   [address] [facility]   7/1/2021 - None Entered   [address] [phone]      Subscriber Name Subscriber Birth Date Member ID    PHYLLIS SMYTH 10/24/1955 [id number]      Guarantor Name (ID) Guarantor Birth Date Guarantor Address Guarantor Type   LIBRAPHYLLIS ([id number]) 10/24/1955 [address] Personal/Family     [place] [address]

## 2024-10-17 NOTE — TELEPHONE ENCOUNTER
Advised patient of Dr. Pineda's note. Patient verbalized understanding and will look on [employer] for the number.

## 2024-10-23 DIAGNOSIS — E03.9 ACQUIRED HYPOTHYROIDISM: Primary | ICD-10-CM

## 2024-10-23 NOTE — TELEPHONE ENCOUNTER
Patient called requesting a refill on the following medication:    levothyroxine 125 MCG Oral Tab     Per patient she would like to know if she needs to complete blood work before she can get a refill.

## 2024-10-25 RX ORDER — LEVOTHYROXINE SODIUM 125 UG/1
125 TABLET ORAL
Qty: 90 TABLET | Refills: 0 | Status: SHIPPED | OUTPATIENT
Start: 2024-10-25

## 2024-10-25 NOTE — TELEPHONE ENCOUNTER
Please review; protocol failed/No Protocol    Requested Prescriptions   Pending Prescriptions Disp Refills    levothyroxine 125 MCG Oral Tab 90 tablet 0     Sig: Take 1 tablet (125 mcg total) by mouth before breakfast.       Thyroid Medication Protocol Failed - 10/25/2024 12:27 PM        Failed - Last TSH value is normal     Lab Results   Component Value Date    TSH 0.231 (L) 08/06/2024                 Passed - TSH in past 12 months        Passed - In person appointment or virtual visit in the past 12 mos or appointment in next 3 mos     Recent Outpatient Visits              4 weeks ago Type 2 diabetes mellitus without retinopathy (HCC)    [facility] Max Mejia MD    Office Visit    1 month ago Acute bacterial sinusitis    [facility], Israel Ferrell MD    Office Visit    2 months ago Annual physical exam    [facility], Farhan Moralez MD    Office Visit    4 months ago Bell's palsy    [facility] Farhan Pineda MD    Office Visit    5 months ago Other psoriasis    [facility] Shannon Fernádnez MD    Office Visit          Future Appointments         Provider Department Appt Notes    In 3 weeks Georgette Rodriguez MD [facility] f/up    In 5 months Max Mejia MD [facility] 6 month follow up                       Future Appointments         Provider Department Appt Notes    In 3 weeks Georgette Rodriguez MD [facility] f/up    In 5 months Max Mejia MD [facility] 6 month follow up          Recent Outpatient Visits              4 weeks ago Type 2 diabetes mellitus without retinopathy (HCC)    [facility]  [place], Max Paniagua MD    Office Visit    1 month ago Acute bacterial sinusitis    [facility] Israel Guadalupe MD    Office Visit    2 months ago Annual physical exam    [facility], Farhan Moralez MD    Office Visit    4 months ago Bell's palsy    [facility]    [facility] Visit    5 months ago Other psoriasis    [facility], Shannon Liu MD    Office Visit

## 2024-10-29 NOTE — TELEPHONE ENCOUNTER
Spoke with patient, verified , informed patient of message below, patient verbalized understanding.

## 2024-11-13 ENCOUNTER — HOSPITAL ENCOUNTER (OUTPATIENT)
Dept: GENERAL RADIOLOGY | Age: 69
Discharge: HOME | End: 2024-11-13
Attending: STUDENT IN AN ORGANIZED HEALTH CARE EDUCATION/TRAINING PROGRAM
Payer: COMMERCIAL

## 2024-11-13 ENCOUNTER — LAB ENCOUNTER (OUTPATIENT)
Dept: LAB | Age: 69
End: 2024-11-13
Attending: STUDENT IN AN ORGANIZED HEALTH CARE EDUCATION/TRAINING PROGRAM
Payer: COMMERCIAL

## 2024-11-13 ENCOUNTER — OFFICE VISIT (OUTPATIENT)
Dept: INTERNAL MEDICINE CLINIC | Facility: CLINIC | Age: 69
End: 2024-11-13

## 2024-11-13 VITALS
BODY MASS INDEX: 44.96 KG/M2 | DIASTOLIC BLOOD PRESSURE: 78 MMHG | TEMPERATURE: 98 F | HEIGHT: 59 IN | SYSTOLIC BLOOD PRESSURE: 135 MMHG | HEART RATE: 78 BPM | OXYGEN SATURATION: 98 % | WEIGHT: 223 LBS

## 2024-11-13 DIAGNOSIS — Z91.81 HISTORY OF RECENT FALL: Primary | ICD-10-CM

## 2024-11-13 DIAGNOSIS — M47.812 CERVICAL ARTHRITIS: ICD-10-CM

## 2024-11-13 DIAGNOSIS — E03.9 ACQUIRED HYPOTHYROIDISM: ICD-10-CM

## 2024-11-13 DIAGNOSIS — M79.672 ACUTE FOOT PAIN, LEFT: ICD-10-CM

## 2024-11-13 DIAGNOSIS — R09.82 POST-NASAL DRIP: ICD-10-CM

## 2024-11-13 DIAGNOSIS — M79.641 RIGHT HAND PAIN: ICD-10-CM

## 2024-11-13 DIAGNOSIS — M25.572 ACUTE LEFT ANKLE PAIN: ICD-10-CM

## 2024-11-13 LAB — TSI SER-ACNC: 16.65 UIU/ML (ref 0.55–4.78)

## 2024-11-13 PROCEDURE — 73100 X-RAY EXAM OF WRIST: CPT | Performed by: STUDENT IN AN ORGANIZED HEALTH CARE EDUCATION/TRAINING PROGRAM

## 2024-11-13 PROCEDURE — 3075F SYST BP GE 130 - 139MM HG: CPT | Performed by: STUDENT IN AN ORGANIZED HEALTH CARE EDUCATION/TRAINING PROGRAM

## 2024-11-13 PROCEDURE — 73130 X-RAY EXAM OF HAND: CPT | Performed by: STUDENT IN AN ORGANIZED HEALTH CARE EDUCATION/TRAINING PROGRAM

## 2024-11-13 PROCEDURE — 3078F DIAST BP <80 MM HG: CPT | Performed by: STUDENT IN AN ORGANIZED HEALTH CARE EDUCATION/TRAINING PROGRAM

## 2024-11-13 PROCEDURE — 73030 X-RAY EXAM OF SHOULDER: CPT | Performed by: STUDENT IN AN ORGANIZED HEALTH CARE EDUCATION/TRAINING PROGRAM

## 2024-11-13 PROCEDURE — 84443 ASSAY THYROID STIM HORMONE: CPT

## 2024-11-13 PROCEDURE — 3008F BODY MASS INDEX DOCD: CPT | Performed by: STUDENT IN AN ORGANIZED HEALTH CARE EDUCATION/TRAINING PROGRAM

## 2024-11-13 PROCEDURE — 36415 COLL VENOUS BLD VENIPUNCTURE: CPT

## 2024-11-13 PROCEDURE — 99215 OFFICE O/P EST HI 40 MIN: CPT | Performed by: STUDENT IN AN ORGANIZED HEALTH CARE EDUCATION/TRAINING PROGRAM

## 2024-11-13 PROCEDURE — 73630 X-RAY EXAM OF FOOT: CPT | Performed by: STUDENT IN AN ORGANIZED HEALTH CARE EDUCATION/TRAINING PROGRAM

## 2024-11-13 PROCEDURE — 72050 X-RAY EXAM NECK SPINE 4/5VWS: CPT | Performed by: STUDENT IN AN ORGANIZED HEALTH CARE EDUCATION/TRAINING PROGRAM

## 2024-11-13 RX ORDER — LORATADINE 10 MG/1
10 TABLET ORAL DAILY
Qty: 90 TABLET | Refills: 0 | Status: SHIPPED | OUTPATIENT
Start: 2024-11-13 | End: 2025-02-11

## 2024-11-13 RX ORDER — METHYLPREDNISOLONE 4 MG/1
TABLET ORAL
Qty: 1 EACH | Refills: 0 | Status: SHIPPED | OUTPATIENT
Start: 2024-11-13

## 2024-11-13 RX ORDER — NAPROXEN 500 MG/1
500 TABLET ORAL 2 TIMES DAILY WITH MEALS
Qty: 60 TABLET | Refills: 0 | Status: SHIPPED | OUTPATIENT
Start: 2024-11-13 | End: 2024-12-13

## 2024-11-13 NOTE — PROGRESS NOTES
OFFICE NOTE     Patient ID: Jing Irving is a 69 year old female.  Today's Date: 11/13/24  Chief Complaint: Sore Throat (3 weeks sore throat on and off, cough at night time) and Fall (Fell hole in road, injured Left foot, b/l knees, R shoulder pain, bruising on R hand)    Pt is a 67y/o female with PMHx of Class 3 obesity, COPD, HTN, CAD, HLD, Hypothyroidism, Obesity, ROGELIO, OA of right hand, cataracts with recurrent sinusitis (last seen sept 2024), for numerous issues today.   Ongoing sore throat on and off for past 3 weeks, cough at nighttime.   Pt sustained fall in [place] and injured left foot, right shoulder and right hand.     Also sore throat at night.           Vitals:    11/13/24 1349   BP: 135/78   Pulse: 78   Temp: 97.8 °F (36.6 °C)   TempSrc: Oral   SpO2: 98%   Weight: 223 lb (101.2 kg)   Height: 4' 11\" (1.499 m)     body mass index is 45.04 kg/m².  BP Readings from Last 3 Encounters:   11/13/24 135/78   09/26/24 (!) 176/100   09/23/24 144/84     The 10-year ASCVD risk score (Mindy TAI, et al., 2019) is: 19.5%    Values used to calculate the score:      Age: 69 years      Sex: Female      Is Non- : No      Diabetic: Yes      Tobacco smoker: No      Systolic Blood Pressure: 135 mmHg      Is BP treated: Yes      HDL Cholesterol: 42 mg/dL      Total Cholesterol: 179 mg/dL      Medications reviewed:  Current Outpatient Medications   Medication Sig Dispense Refill    methylPREDNISolone (MEDROL) 4 MG Oral Tablet Therapy Pack As directed. 1 each 0    naproxen 500 MG Oral Tab Take 1 tablet (500 mg total) by mouth 2 (two) times daily with meals. 60 tablet 0    loratadine 10 MG Oral Tab Take 1 tablet (10 mg total) by mouth daily. 90 tablet 0    levothyroxine 125 MCG Oral Tab Take 1 tablet (125 mcg total) by mouth before breakfast. 90 tablet 0    Phentermine HCl 37.5 MG Oral Tab Take 1 tablet (37.5 mg total) by mouth every morning before breakfast. 30 tablet 5    azelastine 137  MCG/SPRAY Nasal Solution 1-2 sprays by Nasal route in the morning and 1-2 sprays before bedtime. FOR SINUS SYMPTOMS/NASAL CONGESTION.. 30 mL 11    fluticasone propionate 50 MCG/ACT Nasal Suspension 2 sprays by Each Nare route daily. FOR NASAL CONGESTION/SINUS SYMPTOMS. 1 each 11    Tirzepatide (MOUNJARO) 15 MG/0.5ML Subcutaneous Solution Pen-injector Inject 15 mg into the skin once a week. 6 mL 0    rosuvastatin 20 MG Oral Tab Take 1 tablet (20 mg total) by mouth daily. 90 tablet 3    metoprolol succinate ER 25 MG Oral Tablet 24 Hr Take 1 tablet (25 mg total) by mouth daily. 90 tablet 3    lisinopril-hydroCHLOROthiazide 20-25 MG Oral Tab Take 1 tablet by mouth daily. 90 tablet 1    albuterol 108 (90 Base) MCG/ACT Inhalation Aero Soln Inhale 1 puff into the lungs every 6 (six) hours as needed for Wheezing. 1 each 0    Budesonide-Formoterol Fumarate (SYMBICORT) 160-4.5 MCG/ACT Inhalation Aerosol Inhale 2 puffs into the lungs 2 (two) times daily. 1 each 5    methylPREDNISolone 4 MG Oral Tablet Therapy Pack Take as directed with food. (Patient not taking: Reported on 11/13/2024) 1 each 0    Fluocinolone Acetonide Scalp (DERMA-SMOOTHE/FS SCALP) 0.01 % External Oil Use qhs as directed for scalp psoriasis 118 mL 2    levothyroxine 137 MCG Oral Tab Take 137 mcg by mouth before breakfast. (Patient not taking: Reported on 11/13/2024) 90 tablet 1    triamcinolone 0.5 % External Cream Use bid as directed (Patient not taking: Reported on 11/13/2024) 60 g 1    Apremilast (OTEZLA) 10 & 20 & 30 MG Oral Tablet Therapy Pack Take as directed on pack (Patient not taking: Reported on 11/13/2024) 55 each 0         Assessment & Plan    1. History of recent fall (Primary)  -     methylPREDNISolone; As directed.  Dispense: 1 each; Refill: 0  -     Naproxen; Take 1 tablet (500 mg total) by mouth 2 (two) times daily with meals.  Dispense: 60 tablet; Refill: 0  -     PHYSICAL THERAPY - INTERNAL  -     Hand & Microvascular Surgery Referral -  [facility] (Dr. Covarrubias)  -     Ortho Referral - [facility])  2. Acute left ankle pain  -     XR FOOT, COMPLETE (MIN 3 VIEWS), LEFT (CPT=73630); Future; Expected date: 11/13/2024  -     methylPREDNISolone; As directed.  Dispense: 1 each; Refill: 0  -     Naproxen; Take 1 tablet (500 mg total) by mouth 2 (two) times daily with meals.  Dispense: 60 tablet; Refill: 0  -     PHYSICAL THERAPY - INTERNAL  -     Hand & Microvascular Surgery Referral - [facility] (Dr. Covarrubias)  -     Ortho Referral - [facility])  Pt with right foot/ankle pain likely with sprain vs fracture  Plan  -obtain Xray of left foot/ankle to r/o fracture  -start medrol dose pack followed by naproxen 500mg po BID PRN for pain/inflammation  -given instructions to purchase OTC lace up ankle brace, wear/weight bare as tolerated.       3. Acute foot pain, left  -     XR FOOT, COMPLETE (MIN 3 VIEWS), LEFT (CPT=73630); Future; Expected date: 11/13/2024  -     methylPREDNISolone; As directed.  Dispense: 1 each; Refill: 0  -     Naproxen; Take 1 tablet (500 mg total) by mouth 2 (two) times daily with meals.  Dispense: 60 tablet; Refill: 0  -     PHYSICAL THERAPY - INTERNAL  -     Ortho Referral - [facility])  4. Right hand pain  -     XR HAND (MIN 3 VIEWS), RIGHT (CPT=73130); Future; Expected date: 11/13/2024  -     XR WRIST (2 VIEWS), RIGHT (CPT=73100); Future; Expected date: 11/13/2024  -     methylPREDNISolone; As directed.  Dispense: 1 each; Refill: 0  -     Naproxen; Take 1 tablet (500 mg total) by mouth 2 (two) times daily with meals.  Dispense: 60 tablet; Refill: 0  -     PHYSICAL THERAPY - INTERNAL  -     Ortho Referral - [place] ([facility])  Pt with signs of right wrist pain and numbness/tingling, +/- Tinel's sign and Phalen's signs consistent with carpal tunnel vs tendonitis.   Plan:  -Order xray of affected right wrist  -start medrol dose pack followed by naproxen for  anti-inflammatory effect  -Educated patient to obtain OTC Thumb spica splint and wear as much as tolerated day and night    -Follow up with Orthopedic for steroid injection for wrist, shoulder and hand surgeon if fracture of right hand noted    5. Cervical arthritis  -     XR SHOULDER, COMPLETE (MIN 2 VIEWS), RIGHT (CPT=73030); Future; Expected date: 2024  -     XR CERVICAL SPINE (4VIEWS) (CPT=72050); Future; Expected date: 2024  -     methylPREDNISolone; As directed.  Dispense: 1 each; Refill: 0  -     Naproxen; Take 1 tablet (500 mg total) by mouth 2 (two) times daily with meals.  Dispense: 60 tablet; Refill: 0  -     PHYSICAL THERAPY - INTERNAL  -     Ortho Referral - [place] ([facility])  6. Post-nasal drip  -     Loratadine; Take 1 tablet (10 mg total) by mouth daily.  Dispense: 90 tablet; Refill: 0  Pt OTC loratadine 10mg po daily and continue flonase and azelastine    Follow Up: As needed/if symptoms worsen or Return in about 4 weeks (around 2024), or if symptoms worsen or fail to improve..     I spent 41 minutes obtaining pertitent medical history, reviewing pertinent imaging/labs and specialists notes, evaluating patient, discussing differential diagnosis' and various treatment options, reinforcing importance of compliance with treatment plan, and completing documentation.     Encounter Times  PreChartin minutes    Reviewing/Obtainin minutes      Medical Exam: 5 minutes    Plan: 5 minutes      Notes: 7 minutes    Counseling/Education: 6 minutes      Referring/Communicating:   minutes    Ind Interpretation:   minutes      Care Coordination:   minutes       My total time spent caring for the patient on the day of the encounter: 41 minutes.     Objective/ Results:   Physical Exam  Constitutional:       Appearance: She is well-developed.   Cardiovascular:      Rate and Rhythm: Normal rate and regular rhythm.      Heart sounds: Normal heart sounds.   Pulmonary:      Effort:  Pulmonary effort is normal.      Breath sounds: Normal breath sounds.   Abdominal:      General: Bowel sounds are normal.      Palpations: Abdomen is soft.   Musculoskeletal:         General: Swelling and tenderness present.      Right shoulder: Tenderness present. Decreased range of motion.      Right wrist: Tenderness present. Decreased range of motion.      Cervical back: Spasms and tenderness present. Decreased range of motion.      Left ankle: Tenderness present. Decreased range of motion.      Left foot: Decreased range of motion. Tenderness present.   Skin:     General: Skin is warm and dry.   Neurological:      Mental Status: She is alert and oriented to person, place, and time.      Deep Tendon Reflexes: Reflexes are normal and symmetric.        Reviewed:    Patient Active Problem List    Diagnosis    Cervicalgia    Shoulder impingement syndrome, right    Stress    Increased appetite    Age-related nuclear cataract of both eyes    Type 2 diabetes mellitus without retinopathy ([facility])    Encounter for therapeutic drug monitoring    Binge eating    ROGELIO (obstructive sleep apnea)    Morbid obesity with BMI of 45.0-49.9, adult ([facility])    Pain of right thumb    Right wrist pain    Ulnar impaction syndrome    Primary osteoarthritis of first carpometacarpal joint of right hand    Degenerative arthritis of finger    Ganglion of right hand    COPD (chronic obstructive pulmonary disease) ([facility])    Blepharitis    Dry eyes, bilateral    Hypothyroid    Essential hypertension    Obesity    Hypercholesterolemia      Allergies[1]     Social History     Socioeconomic History    Marital status:    Occupational History    Occupation: office   Tobacco Use    Smoking status: Former     Current packs/day: 0.00     Average packs/day: 0.5 packs/day for 7.0 years (3.5 ttl pk-yrs)     Types: Cigarettes     Start date: 1993     Quit date: 2000     Years since quittin.3     Passive exposure: Past    Smokeless tobacco:  Never    Tobacco comments:     quit smoking 2002    Vaping Use    Vaping status: Never Used   Substance and Sexual Activity    Alcohol use: Not Currently     Alcohol/week: 1.0 standard drink of alcohol     Types: 1 Glasses of wine per week     Comment: social - monthly    Drug use: No    Sexual activity: Yes     Partners: Male     Comment: none   Other Topics Concern    Grew up on a farm No    History of tanning Yes    Outdoor occupation No    Pt has a pacemaker No    Pt has a defibrillator No    Breast feeding No    Reaction to local anesthetic No    Exercise No      Review of Systems  All other systems negative unless otherwise stated in ROS or HPI above.       Israel Guadalupe MD  Internal Medicine       Call office with any questions or seek emergency care if necessary.   Patient understands and agrees to follow directions and advice.      ----------------------------------------- PATIENT INSTRUCTIONS-----------------------------------------     There are no Patient Instructions on file for this visit.        The 21st Century Cures Act makes medical notes available to patients in the interest of transparency.  However, please be advised that this is a medical document.  It is intended as a peer to peer communication.  It is written in medical language and may contain abbreviations or verbiage that are technical and unfamiliar.  It may appear blunt or direct.  Medical documents are intended to carry relevant information, facts as evident, and the clinical opinion of the practitioner.          [1] No Known Allergies

## 2024-11-14 ENCOUNTER — TELEPHONE (OUTPATIENT)
Dept: INTERNAL MEDICINE CLINIC | Facility: CLINIC | Age: 69
End: 2024-11-14

## 2024-11-14 DIAGNOSIS — R79.89 ELEVATED TSH: Primary | ICD-10-CM

## 2024-11-14 RX ORDER — LEVOTHYROXINE SODIUM 137 UG/1
137 TABLET ORAL
Qty: 90 TABLET | Refills: 0 | Status: SHIPPED | OUTPATIENT
Start: 2024-11-14

## 2024-11-14 NOTE — TELEPHONE ENCOUNTER
Dr. Guadalupe please advise, patient requesting a note to excuse her from work due to her not feeling well from 11/13/2024-11/15/2024. Letter pended to be signed

## 2024-11-14 NOTE — TELEPHONE ENCOUNTER
Work letter sent in a my chart message to patient. Patient was made aware we would be sending the letter via my chart. Verbalized understanding.

## 2024-11-17 NOTE — PROGRESS NOTES
Please relay to pt (multiple duplicate messages consolidated below for all imaging):  Eliot Ch,    I have reviewed you xrays:  Cervical xray: arthritis and right sided foraminal narrowing C5-C6, C6-C7  Right shoulder; Mild arthritis of right shoulder  Right wrist: Mild arthritis of the wrist  Right Hand xray: no acute skeletal abnormality  Left foot xray: heel spur and arthritis noted    Please follow up with physical therapy for joint pain, also likely symptoms you have of your right Arm is from the level of the neck. So please get a tempurpedic neck pillow, go to physical therapy and follow up with physiatrist for further evaluation, as for your foot. Please follow up with podiatry.  -Dr. Guadalupe

## 2024-11-17 NOTE — PROGRESS NOTES
Eliot Ch,    I have reviewed you xrays:  Cervical xray: arthritis and right sided foraminal narrowing C5-C6, C6-C7  Right shoulder; Mild arthritis of right shoulder  Right wrist: Mild arthritis of the wrist  Right Hand xray: no acute skeletal abnormality  Left foot xray: heel spur and arthritis noted    Please follow up with physical therapy for joint pain, also likely symptoms you have of your right Arm is from the level of the neck. So please get a tempurpedic neck pillow, go to physical therapy and follow up with physiatrist for further evaluation, as for your foot. Please follow up with podiatry.  -Dr. Guadalupe

## 2024-11-18 ENCOUNTER — TELEPHONE (OUTPATIENT)
Dept: INTERNAL MEDICINE CLINIC | Facility: CLINIC | Age: 69
End: 2024-11-18

## 2024-11-18 ENCOUNTER — TELEPHONE (OUTPATIENT)
Dept: ENDOCRINOLOGY CLINIC | Facility: CLINIC | Age: 69
End: 2024-11-18

## 2024-11-18 DIAGNOSIS — E11.69 TYPE 2 DIABETES MELLITUS WITH OTHER SPECIFIED COMPLICATION, WITHOUT LONG-TERM CURRENT USE OF INSULIN (HCC): Primary | ICD-10-CM

## 2024-11-18 NOTE — TELEPHONE ENCOUNTER
Informed patient referral has to see Dr. Rodriguez has been signed by Dr. Pineda. Patient verbalizes understanding.

## 2024-11-18 NOTE — TELEPHONE ENCOUNTER
Patient missed appointment today and declined the next date. Patient states she needs to be seen right away due to her health condition please call

## 2024-11-19 ENCOUNTER — TELEPHONE (OUTPATIENT)
Dept: INTERNAL MEDICINE CLINIC | Facility: CLINIC | Age: 69
End: 2024-11-19

## 2024-11-20 NOTE — TELEPHONE ENCOUNTER
Called pt, VM not set up. MC sent.     Dr. Rodriguez,     Patient missed appt 11/18 and is requesting sooner appt, okay for pt to be scheduled for first available, 1/10?

## 2024-11-21 ENCOUNTER — OFFICE VISIT (OUTPATIENT)
Dept: INTERNAL MEDICINE CLINIC | Facility: CLINIC | Age: 69
End: 2024-11-21

## 2024-11-21 VITALS
HEIGHT: 59 IN | TEMPERATURE: 98 F | BODY MASS INDEX: 44.76 KG/M2 | OXYGEN SATURATION: 98 % | HEART RATE: 92 BPM | SYSTOLIC BLOOD PRESSURE: 122 MMHG | DIASTOLIC BLOOD PRESSURE: 77 MMHG | WEIGHT: 222 LBS

## 2024-11-21 DIAGNOSIS — J32.9 SINUSITIS, UNSPECIFIED CHRONICITY, UNSPECIFIED LOCATION: ICD-10-CM

## 2024-11-21 DIAGNOSIS — R09.82 POST-NASAL DRIP: Primary | ICD-10-CM

## 2024-11-21 PROCEDURE — 3008F BODY MASS INDEX DOCD: CPT | Performed by: STUDENT IN AN ORGANIZED HEALTH CARE EDUCATION/TRAINING PROGRAM

## 2024-11-21 PROCEDURE — 99214 OFFICE O/P EST MOD 30 MIN: CPT | Performed by: STUDENT IN AN ORGANIZED HEALTH CARE EDUCATION/TRAINING PROGRAM

## 2024-11-21 PROCEDURE — 3078F DIAST BP <80 MM HG: CPT | Performed by: STUDENT IN AN ORGANIZED HEALTH CARE EDUCATION/TRAINING PROGRAM

## 2024-11-21 PROCEDURE — 3074F SYST BP LT 130 MM HG: CPT | Performed by: STUDENT IN AN ORGANIZED HEALTH CARE EDUCATION/TRAINING PROGRAM

## 2024-11-21 RX ORDER — FLUTICASONE PROPIONATE 50 MCG
2 SPRAY, SUSPENSION (ML) NASAL DAILY
Qty: 1 EACH | Refills: 11 | Status: SHIPPED | OUTPATIENT
Start: 2024-11-21

## 2024-11-21 RX ORDER — AZELASTINE HYDROCHLORIDE 137 UG/1
1-2 SPRAY, METERED NASAL 2 TIMES DAILY
Qty: 30 ML | Refills: 11 | Status: SHIPPED | OUTPATIENT
Start: 2024-11-21

## 2024-11-21 RX ORDER — METHYLPREDNISOLONE 4 MG/1
TABLET ORAL
Qty: 1 EACH | Refills: 0 | Status: SHIPPED | OUTPATIENT
Start: 2024-11-21

## 2024-11-21 NOTE — PROGRESS NOTES
OFFICE NOTE     Patient ID: Jing Irving is a 69 year old female.  Today's Date: 11/21/24  Chief Complaint: Sore Throat (Still having symptoms, difficulty swallowing, coughing, runny nose)    Pt is a 69y/o female with PMHx of Class 3 obesity, COPD, HTN, CAD, HLD, Hypothyroidism, Obesity, ROGELIO, Ride cervicalgia/foraminal narrowing, left foot heel spur, OA of right hand, cataracts whom presents to clinic with ongoign sore throat for past month    Pt was prescribe loratadine on 11/13 and medrol dose pack, using nasal spray and inhalers.         Vitals:    11/21/24 1300   BP: 122/77   Pulse: 92   Temp: 97.5 °F (36.4 °C)   TempSrc: Oral   SpO2: 98%   Weight: 222 lb (100.7 kg)   Height: 4' 11\" (1.499 m)     body mass index is 44.84 kg/m².  BP Readings from Last 3 Encounters:   11/21/24 122/77   11/13/24 135/78   09/26/24 (!) 176/100     The 10-year ASCVD risk score (Mindy TAI, et al., 2019) is: 16.2%    Values used to calculate the score:      Age: 69 years      Sex: Female      Is Non- : No      Diabetic: Yes      Tobacco smoker: No      Systolic Blood Pressure: 122 mmHg      Is BP treated: Yes      HDL Cholesterol: 42 mg/dL      Total Cholesterol: 179 mg/dL      Medications reviewed:  Current Outpatient Medications   Medication Sig Dispense Refill    amoxicillin clavulanate 875-125 MG Oral Tab Take 1 tablet by mouth 2 (two) times daily for 10 days. 20 tablet 0    azelastine 137 MCG/SPRAY Nasal Solution 1-2 sprays by Nasal route in the morning and 1-2 sprays before bedtime. FOR SINUS SYMPTOMS/NASAL CONGESTION.. 30 mL 11    fluticasone propionate 50 MCG/ACT Nasal Suspension 2 sprays by Each Nare route daily. FOR NASAL CONGESTION/SINUS SYMPTOMS. 1 each 11    Benzocaine-Menthol 6-10 MG Mouth/Throat Lozenge Take 1 lozenge by mouth every 2 (two) hours as needed for Pain. 20 lozenge 0    methylPREDNISolone 4 MG Oral Tablet Therapy Pack Take as directed with food. 1 each 0    levothyroxine  137 MCG Oral Tab Take 137 mcg by mouth before breakfast. 90 tablet 0    naproxen 500 MG Oral Tab Take 1 tablet (500 mg total) by mouth 2 (two) times daily with meals. (Patient taking differently: Take 1 tablet (500 mg total) by mouth 2 (two) times daily with meals. Pt states takes as needed) 60 tablet 0    loratadine 10 MG Oral Tab Take 1 tablet (10 mg total) by mouth daily. 90 tablet 0    Phentermine HCl 37.5 MG Oral Tab Take 1 tablet (37.5 mg total) by mouth every morning before breakfast. 30 tablet 5    Tirzepatide (MOUNJARO) 15 MG/0.5ML Subcutaneous Solution Pen-injector Inject 15 mg into the skin once a week. 6 mL 0    Fluocinolone Acetonide Scalp (DERMA-SMOOTHE/FS SCALP) 0.01 % External Oil Use qhs as directed for scalp psoriasis 118 mL 2    rosuvastatin 20 MG Oral Tab Take 1 tablet (20 mg total) by mouth daily. 90 tablet 3    metoprolol succinate ER 25 MG Oral Tablet 24 Hr Take 1 tablet (25 mg total) by mouth daily. 90 tablet 3    lisinopril-hydroCHLOROthiazide 20-25 MG Oral Tab Take 1 tablet by mouth daily. 90 tablet 1    albuterol 108 (90 Base) MCG/ACT Inhalation Aero Soln Inhale 1 puff into the lungs every 6 (six) hours as needed for Wheezing. 1 each 0    Budesonide-Formoterol Fumarate (SYMBICORT) 160-4.5 MCG/ACT Inhalation Aerosol Inhale 2 puffs into the lungs 2 (two) times daily. 1 each 5    methylPREDNISolone (MEDROL) 4 MG Oral Tablet Therapy Pack As directed. (Patient not taking: Reported on 11/21/2024) 1 each 0    methylPREDNISolone 4 MG Oral Tablet Therapy Pack Take as directed with food. (Patient not taking: Reported on 9/26/2024) 1 each 0    triamcinolone 0.5 % External Cream Use bid as directed (Patient not taking: Reported on 6/17/2024) 60 g 1    Apremilast (OTEZLA) 10 & 20 & 30 MG Oral Tablet Therapy Pack Take as directed on pack (Patient not taking: Reported on 6/17/2024) 55 each 0         Assessment & Plan    1. Post-nasal drip (Primary)  -     ENT - INTERNAL  -     Amoxicillin-Pot  Clavulanate; Take 1 tablet by mouth 2 (two) times daily for 10 days.  Dispense: 20 tablet; Refill: 0  -     Azelastine HCl; 1-2 sprays by Nasal route in the morning and 1-2 sprays before bedtime. FOR SINUS SYMPTOMS/NASAL CONGESTION..  Dispense: 30 mL; Refill: 11  -     Fluticasone Propionate; 2 sprays by Each Nare route daily. FOR NASAL CONGESTION/SINUS SYMPTOMS.  Dispense: 1 each; Refill: 11  -     Benzocaine-Menthol; Take 1 lozenge by mouth every 2 (two) hours as needed for Pain.  Dispense: 20 lozenge; Refill: 0  -     methylPREDNISolone; Take as directed with food.  Dispense: 1 each; Refill: 0  2. Sinusitis, unspecified chronicity, unspecified location  -     ENT - INTERNAL  -     Amoxicillin-Pot Clavulanate; Take 1 tablet by mouth 2 (two) times daily for 10 days.  Dispense: 20 tablet; Refill: 0  -     Azelastine HCl; 1-2 sprays by Nasal route in the morning and 1-2 sprays before bedtime. FOR SINUS SYMPTOMS/NASAL CONGESTION..  Dispense: 30 mL; Refill: 11  -     Fluticasone Propionate; 2 sprays by Each Nare route daily. FOR NASAL CONGESTION/SINUS SYMPTOMS.  Dispense: 1 each; Refill: 11  -     Benzocaine-Menthol; Take 1 lozenge by mouth every 2 (two) hours as needed for Pain.  Dispense: 20 lozenge; Refill: 0  -     methylPREDNISolone; Take as directed with food.  Dispense: 1 each; Refill: 0  Patient presenting URI and now sinus congestion tenderness and erythematous nasal turbinates consistent with acute secondary acute bacterial rhinosinusitis.  Plan:  -take hot showers, drink tea and increase fluid intake   -Start Augmentin 1 tablet twice daily for 10 days  - pt educated while taking antibiotics should also take OTC priobiotics daily and/or natural probiotics such as greek yogurt/Kefir to help prevent development of C.Diff.  Flonase(Fluticazone generic acceptable) 1-2 puff each nostril twice daily for next month or till symptom resolves, Azelastine 1-2 puff each nostril twice daily for next month or till symptom  resolves, Medrol dose pack for severe inflammation  -If no improvement, referral given to ENT for further evaluation and evaluation of possible nasal polyp or if CT sinus warranted    Follow Up: As needed/if symptoms worsen or No follow-ups on file..      Objective/ Results:   Physical Exam  Constitutional:       Appearance: She is well-developed.   HENT:      Nose: Congestion (bilateraly) and rhinorrhea present.      Right Turbinates: Enlarged and swollen.      Left Turbinates: Enlarged and swollen.   Cardiovascular:      Rate and Rhythm: Normal rate and regular rhythm.      Heart sounds: Normal heart sounds.   Pulmonary:      Effort: Pulmonary effort is normal.      Breath sounds: Normal breath sounds.   Abdominal:      General: Bowel sounds are normal.      Palpations: Abdomen is soft.   Skin:     General: Skin is warm and dry.   Neurological:      Mental Status: She is alert and oriented to person, place, and time.      Deep Tendon Reflexes: Reflexes are normal and symmetric.        Reviewed:    Patient Active Problem List    Diagnosis    Cervicalgia    Shoulder impingement syndrome, right    Stress    Increased appetite    Age-related nuclear cataract of both eyes    Type 2 diabetes mellitus without retinopathy ([facility])    Encounter for therapeutic drug monitoring    Binge eating    ROGELIO (obstructive sleep apnea)    Morbid obesity with BMI of 45.0-49.9, adult ([facility])    Pain of right thumb    Right wrist pain    Ulnar impaction syndrome    Primary osteoarthritis of first carpometacarpal joint of right hand    Degenerative arthritis of finger    Ganglion of right hand    COPD (chronic obstructive pulmonary disease) ([facility])    Blepharitis    Dry eyes, bilateral    Hypothyroid    Essential hypertension    Obesity    Hypercholesterolemia      Allergies[1]     Social History     Socioeconomic History    Marital status:    Occupational History    Occupation: office   Tobacco Use    Smoking status: Former     Current  packs/day: 0.00     Average packs/day: 0.5 packs/day for 7.0 years (3.5 ttl pk-yrs)     Types: Cigarettes     Start date: 1993     Quit date: 2000     Years since quittin.3     Passive exposure: Past    Smokeless tobacco: Never    Tobacco comments:     quit smoking     Vaping Use    Vaping status: Never Used   Substance and Sexual Activity    Alcohol use: Not Currently     Alcohol/week: 1.0 standard drink of alcohol     Types: 1 Glasses of wine per week     Comment: social - monthly    Drug use: No    Sexual activity: Yes     Partners: Male     Comment: none   Other Topics Concern    Grew up on a farm No    History of tanning Yes    Outdoor occupation No    Pt has a pacemaker No    Pt has a defibrillator No    Breast feeding No    Reaction to local anesthetic No    Exercise No      Review of Systems   Constitutional: Negative.    HENT:  Positive for postnasal drip, sinus pressure, sinus pain, sore throat, trouble swallowing and voice change.    Respiratory: Negative.     Cardiovascular: Negative.    Gastrointestinal: Negative.    Skin: Negative.    Neurological: Negative.      All other systems negative unless otherwise stated in ROS or HPI above.       Israel Guadalupe MD  Internal Medicine       Call office with any questions or seek emergency care if necessary.   Patient understands and agrees to follow directions and advice.      ----------------------------------------- PATIENT INSTRUCTIONS-----------------------------------------     There are no Patient Instructions on file for this visit.        The  [employer] Cures Act makes medical notes available to patients in the interest of transparency.  However, please be advised that this is a medical document.  It is intended as a peer to peer communication.  It is written in medical language and may contain abbreviations or verbiage that are technical and unfamiliar.  It may appear blunt or direct.  Medical documents are intended to carry relevant  information, facts as evident, and the clinical opinion of the practitioner.          [1] No Known Allergies

## 2024-12-05 ENCOUNTER — OFFICE VISIT (OUTPATIENT)
Dept: OTOLARYNGOLOGY | Facility: CLINIC | Age: 69
End: 2024-12-05

## 2024-12-05 DIAGNOSIS — R09.82 POST-NASAL DRIP: Primary | ICD-10-CM

## 2024-12-05 DIAGNOSIS — K21.00 GASTROESOPHAGEAL REFLUX DISEASE WITH ESOPHAGITIS, UNSPECIFIED WHETHER HEMORRHAGE: ICD-10-CM

## 2024-12-05 DIAGNOSIS — J32.9 SINUSITIS, UNSPECIFIED CHRONICITY, UNSPECIFIED LOCATION: ICD-10-CM

## 2024-12-05 DIAGNOSIS — J04.0 LARYNGITIS: ICD-10-CM

## 2024-12-05 DIAGNOSIS — R05.3 CHRONIC COUGH: ICD-10-CM

## 2024-12-05 RX ORDER — GUAIFENESIN AND CODEINE PHOSPHATE 100; 10 MG/5ML; MG/5ML
10 SOLUTION ORAL 3 TIMES DAILY PRN
Qty: 150 ML | Refills: 0 | Status: SHIPPED | OUTPATIENT
Start: 2024-12-05 | End: 2024-12-10

## 2024-12-05 RX ORDER — OMEPRAZOLE 40 MG/1
40 CAPSULE, DELAYED RELEASE ORAL
Qty: 60 CAPSULE | Refills: 0 | Status: SHIPPED | OUTPATIENT
Start: 2024-12-05 | End: 2025-01-04
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
## 2024-12-06 ENCOUNTER — TELEPHONE (OUTPATIENT)
Dept: DERMATOLOGY CLINIC | Facility: CLINIC | Age: 69
End: 2024-12-06

## 2024-12-06 RX ORDER — OMEPRAZOLE 40 MG/1
40 CAPSULE, DELAYED RELEASE ORAL
Qty: 180 CAPSULE | Refills: 0 | OUTPATIENT
Start: 2024-12-06

## 2024-12-06 NOTE — TELEPHONE ENCOUNTER
Please disregard.  PA good through 5/2025.  Pt receiving this medication from [facility].   previous TE.

## 2024-12-27 DIAGNOSIS — E66.01 MORBID OBESITY WITH BMI OF 45.0-49.9, ADULT (HCC): ICD-10-CM

## 2024-12-30 RX ORDER — PHENTERMINE HYDROCHLORIDE 37.5 MG/1
37.5 TABLET ORAL
Qty: 90 TABLET | Refills: 0 | Status: SHIPPED | OUTPATIENT
Start: 2024-12-30

## 2024-12-31 ENCOUNTER — TELEPHONE (OUTPATIENT)
Dept: SURGERY | Facility: CLINIC | Age: 69
End: 2024-12-31

## 2025-01-03 ENCOUNTER — OFFICE VISIT (OUTPATIENT)
Dept: INTERNAL MEDICINE CLINIC | Facility: CLINIC | Age: 70
End: 2025-01-03

## 2025-01-03 ENCOUNTER — TELEPHONE (OUTPATIENT)
Dept: SURGERY | Facility: CLINIC | Age: 70
End: 2025-01-03

## 2025-01-03 VITALS
DIASTOLIC BLOOD PRESSURE: 88 MMHG | WEIGHT: 225 LBS | SYSTOLIC BLOOD PRESSURE: 138 MMHG | HEIGHT: 59 IN | OXYGEN SATURATION: 99 % | BODY MASS INDEX: 45.36 KG/M2 | RESPIRATION RATE: 16 BRPM | HEART RATE: 79 BPM

## 2025-01-03 DIAGNOSIS — R09.81 NASAL CONGESTION: ICD-10-CM

## 2025-01-03 DIAGNOSIS — H04.203 WATERY EYES: ICD-10-CM

## 2025-01-03 DIAGNOSIS — J02.9 SORE THROAT: Primary | ICD-10-CM

## 2025-01-03 PROCEDURE — 3008F BODY MASS INDEX DOCD: CPT | Performed by: INTERNAL MEDICINE

## 2025-01-03 PROCEDURE — 3079F DIAST BP 80-89 MM HG: CPT | Performed by: INTERNAL MEDICINE

## 2025-01-03 PROCEDURE — 99213 OFFICE O/P EST LOW 20 MIN: CPT | Performed by: INTERNAL MEDICINE

## 2025-01-03 PROCEDURE — 3075F SYST BP GE 130 - 139MM HG: CPT | Performed by: INTERNAL MEDICINE

## 2025-01-03 PROCEDURE — 87880 STREP A ASSAY W/OPTIC: CPT | Performed by: INTERNAL MEDICINE
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
## 2025-01-07 ENCOUNTER — TELEPHONE (OUTPATIENT)
Dept: INTERNAL MEDICINE CLINIC | Facility: CLINIC | Age: 70
End: 2025-01-07

## 2025-01-07 NOTE — TELEPHONE ENCOUNTER
Request for medical clearance received from [facility]. Patient is having a right cataract surgery on 3/20/25 and left cataract surgery on 4/10/25 with Dr Stevie Reyes. Cloud[employer] message sent to patient to schedule an appointment with Dr Pineda prior to surgery date.

## 2025-01-09 ENCOUNTER — MED REC SCAN ONLY (OUTPATIENT)
Dept: INTERNAL MEDICINE CLINIC | Facility: CLINIC | Age: 70
End: 2025-01-09

## 2025-02-24 ENCOUNTER — EKG ENCOUNTER (OUTPATIENT)
Dept: LAB | Age: 70
End: 2025-02-24
Attending: INTERNAL MEDICINE
Payer: COMMERCIAL

## 2025-02-24 ENCOUNTER — OFFICE VISIT (OUTPATIENT)
Dept: INTERNAL MEDICINE CLINIC | Facility: CLINIC | Age: 70
End: 2025-02-24
Payer: COMMERCIAL

## 2025-02-24 ENCOUNTER — OFFICE VISIT (OUTPATIENT)
Dept: ENDOCRINOLOGY CLINIC | Facility: CLINIC | Age: 70
End: 2025-02-24
Payer: COMMERCIAL

## 2025-02-24 ENCOUNTER — LAB ENCOUNTER (OUTPATIENT)
Dept: LAB | Age: 70
End: 2025-02-24
Attending: INTERNAL MEDICINE
Payer: COMMERCIAL

## 2025-02-24 VITALS
SYSTOLIC BLOOD PRESSURE: 120 MMHG | BODY MASS INDEX: 45 KG/M2 | DIASTOLIC BLOOD PRESSURE: 70 MMHG | WEIGHT: 220.5 LBS | HEART RATE: 82 BPM

## 2025-02-24 VITALS
HEART RATE: 87 BPM | WEIGHT: 221.13 LBS | HEIGHT: 59 IN | BODY MASS INDEX: 44.58 KG/M2 | DIASTOLIC BLOOD PRESSURE: 73 MMHG | SYSTOLIC BLOOD PRESSURE: 116 MMHG

## 2025-02-24 DIAGNOSIS — E11.69 TYPE 2 DIABETES MELLITUS WITH OTHER SPECIFIED COMPLICATION, WITHOUT LONG-TERM CURRENT USE OF INSULIN (HCC): ICD-10-CM

## 2025-02-24 DIAGNOSIS — Z01.818 PREOP GENERAL PHYSICAL EXAM: ICD-10-CM

## 2025-02-24 DIAGNOSIS — Z01.818 PREOP GENERAL PHYSICAL EXAM: Primary | ICD-10-CM

## 2025-02-24 DIAGNOSIS — E78.00 PURE HYPERCHOLESTEROLEMIA: ICD-10-CM

## 2025-02-24 DIAGNOSIS — Z12.31 ENCOUNTER FOR SCREENING MAMMOGRAM FOR BREAST CANCER: ICD-10-CM

## 2025-02-24 DIAGNOSIS — I25.10 CORONARY ARTERY DISEASE INVOLVING NATIVE CORONARY ARTERY OF NATIVE HEART WITHOUT ANGINA PECTORIS: ICD-10-CM

## 2025-02-24 DIAGNOSIS — E03.9 ACQUIRED HYPOTHYROIDISM: ICD-10-CM

## 2025-02-24 DIAGNOSIS — E11.69 TYPE 2 DIABETES MELLITUS WITH OTHER SPECIFIED COMPLICATION, WITHOUT LONG-TERM CURRENT USE OF INSULIN (HCC): Primary | ICD-10-CM

## 2025-02-24 DIAGNOSIS — H25.9 AGE-RELATED CATARACT OF BOTH EYES, UNSPECIFIED AGE-RELATED CATARACT TYPE: ICD-10-CM

## 2025-02-24 DIAGNOSIS — E78.5 DYSLIPIDEMIA: ICD-10-CM

## 2025-02-24 DIAGNOSIS — I10 ESSENTIAL HYPERTENSION: ICD-10-CM

## 2025-02-24 DIAGNOSIS — G47.33 OSA (OBSTRUCTIVE SLEEP APNEA): ICD-10-CM

## 2025-02-24 DIAGNOSIS — E03.9 HYPOTHYROIDISM, UNSPECIFIED TYPE: ICD-10-CM

## 2025-02-24 LAB
ALBUMIN SERPL-MCNC: 4.7 G/DL (ref 3.2–4.8)
ALBUMIN/GLOB SERPL: 1.8 {RATIO} (ref 1–2)
ALP LIVER SERPL-CCNC: 69 U/L
ALT SERPL-CCNC: 13 U/L
ANION GAP SERPL CALC-SCNC: 10 MMOL/L (ref 0–18)
AST SERPL-CCNC: 20 U/L (ref ?–34)
ATRIAL RATE: 77 BPM
BILIRUB SERPL-MCNC: 0.9 MG/DL (ref 0.2–1.1)
BUN BLD-MCNC: 22 MG/DL (ref 9–23)
BUN/CREAT SERPL: 23.4 (ref 10–20)
CALCIUM BLD-MCNC: 9.3 MG/DL (ref 8.7–10.4)
CHLORIDE SERPL-SCNC: 105 MMOL/L (ref 98–112)
CHOLEST SERPL-MCNC: 172 MG/DL (ref ?–200)
CO2 SERPL-SCNC: 29 MMOL/L (ref 21–32)
CREAT BLD-MCNC: 0.94 MG/DL
CREAT UR-SCNC: 317.1 MG/DL
EGFRCR SERPLBLD CKD-EPI 2021: 67 ML/MIN/1.73M2 (ref 60–?)
FASTING PATIENT LIPID ANSWER: YES
FASTING STATUS PATIENT QL REPORTED: YES
GLOBULIN PLAS-MCNC: 2.6 G/DL (ref 2–3.5)
GLUCOSE BLD-MCNC: 107 MG/DL (ref 70–99)
GLUCOSE BLOOD: 169
HDLC SERPL-MCNC: 44 MG/DL (ref 40–59)
HEMOGLOBIN A1C: 6.3 % (ref 4.3–5.6)
LDLC SERPL CALC-MCNC: 106 MG/DL (ref ?–100)
MICROALBUMIN UR-MCNC: 1.5 MG/DL
MICROALBUMIN/CREAT 24H UR-RTO: 4.7 UG/MG (ref ?–30)
NONHDLC SERPL-MCNC: 128 MG/DL (ref ?–130)
OSMOLALITY SERPL CALC.SUM OF ELEC: 302 MOSM/KG (ref 275–295)
P AXIS: -20 DEGREES
P-R INTERVAL: 152 MS
POTASSIUM SERPL-SCNC: 3.3 MMOL/L (ref 3.5–5.1)
PROT SERPL-MCNC: 7.3 G/DL (ref 5.7–8.2)
Q-T INTERVAL: 410 MS
QRS DURATION: 80 MS
QTC CALCULATION (BEZET): 463 MS
R AXIS: 30 DEGREES
SODIUM SERPL-SCNC: 144 MMOL/L (ref 136–145)
T AXIS: 66 DEGREES
TEST STRIP LOT #: NORMAL NUMERIC
TRIGL SERPL-MCNC: 120 MG/DL (ref 30–149)
TSI SER-ACNC: 9.19 UIU/ML (ref 0.55–4.78)
VENTRICULAR RATE: 77 BPM
VLDLC SERPL CALC-MCNC: 20 MG/DL (ref 0–30)

## 2025-02-24 PROCEDURE — 3074F SYST BP LT 130 MM HG: CPT | Performed by: INTERNAL MEDICINE

## 2025-02-24 PROCEDURE — 82043 UR ALBUMIN QUANTITATIVE: CPT

## 2025-02-24 PROCEDURE — 93010 ELECTROCARDIOGRAM REPORT: CPT | Performed by: INTERNAL MEDICINE

## 2025-02-24 PROCEDURE — 82570 ASSAY OF URINE CREATININE: CPT

## 2025-02-24 PROCEDURE — 3044F HG A1C LEVEL LT 7.0%: CPT | Performed by: INTERNAL MEDICINE

## 2025-02-24 PROCEDURE — 99214 OFFICE O/P EST MOD 30 MIN: CPT | Performed by: INTERNAL MEDICINE

## 2025-02-24 PROCEDURE — 3061F NEG MICROALBUMINURIA REV: CPT | Performed by: INTERNAL MEDICINE

## 2025-02-24 PROCEDURE — 84443 ASSAY THYROID STIM HORMONE: CPT

## 2025-02-24 PROCEDURE — 3008F BODY MASS INDEX DOCD: CPT | Performed by: INTERNAL MEDICINE

## 2025-02-24 PROCEDURE — 36415 COLL VENOUS BLD VENIPUNCTURE: CPT

## 2025-02-24 PROCEDURE — 80053 COMPREHEN METABOLIC PANEL: CPT

## 2025-02-24 PROCEDURE — 93005 ELECTROCARDIOGRAM TRACING: CPT

## 2025-02-24 PROCEDURE — 82947 ASSAY GLUCOSE BLOOD QUANT: CPT | Performed by: INTERNAL MEDICINE

## 2025-02-24 PROCEDURE — 3078F DIAST BP <80 MM HG: CPT | Performed by: INTERNAL MEDICINE

## 2025-02-24 PROCEDURE — 80061 LIPID PANEL: CPT

## 2025-02-24 PROCEDURE — 83036 HEMOGLOBIN GLYCOSYLATED A1C: CPT | Performed by: INTERNAL MEDICINE

## 2025-02-24 RX ORDER — TIRZEPATIDE 15 MG/.5ML
15 INJECTION, SOLUTION SUBCUTANEOUS WEEKLY
Qty: 6 ML | Refills: 1 | Status: SHIPPED | OUTPATIENT
Start: 2025-02-24

## 2025-02-24 RX ORDER — ROSUVASTATIN CALCIUM 20 MG/1
20 TABLET, COATED ORAL DAILY
Qty: 90 TABLET | Refills: 3 | Status: SHIPPED | OUTPATIENT
Start: 2025-02-24
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
## 2025-02-24 NOTE — PROGRESS NOTES
Return Office Visit     CHIEF COMPLAINT:  Type 2 DM     Hypothyroidism    HISTORY OF PRESENT ILLNESS:  Jing Irving is a 69 year old female who presents for follow up for for Type 2 DM and hypothyroidism     DM HISTORY  Diagnosed:  2018     HISTORY OF DIABETES COMPLICATIONS: :  History of Retinopathy: No - last eye exam: 2024 , from notes not clear if she had a diabetic eye exam. She has an apt coming up at the [facility] and will discuss this with them  History of Neuropathy: no  History of Nephropathy: no     ASSOCIATED COMPLICATIONS:   HTN: yes  Hyperlipidemia: yes  Coronary Artery Disease:  no  Cerebrovascular Disease: no        HOME GLUCOSE READINGS:   Checks sugars a few times week   100-120 on most occasions     No symptoms of hypoglycemia.          CURRENT DIABETIC MEDICATIONS INCLUDE  Mounjaro 15  mg weekly          MEALS:  Dietary compliance with a low carb diet is moderate    EXERCISE:  No     HYPOTHYROIDISM:   She is on LT4 137 mcg PO daily  Reports compliance  Takes empty stomach       CURRENT MEDICATION:    Current Outpatient Medications   Medication Sig Dispense Refill    Tirzepatide (MOUNJARO) 15 MG/0.5ML Subcutaneous Solution Auto-injector Inject 15 mg into the skin once a week. 6 mL 1    PHENTERMINE HCL 37.5 MG Oral Tab TAKE 1 TABLET(37.5 MG) BY MOUTH EVERY MORNING BEFORE BREAKFAST 90 tablet 0    azelastine 137 MCG/SPRAY Nasal Solution 1-2 sprays by Nasal route in the morning and 1-2 sprays before bedtime. FOR SINUS SYMPTOMS/NASAL CONGESTION.. 30 mL 11    levothyroxine 137 MCG Oral Tab Take 137 mcg by mouth before breakfast. 90 tablet 0    Fluocinolone Acetonide Scalp (DERMA-SMOOTHE/FS SCALP) 0.01 % External Oil Use qhs as directed for scalp psoriasis 118 mL 2    rosuvastatin 20 MG Oral Tab Take 1 tablet (20 mg total) by mouth daily. 90 tablet 3    metoprolol succinate ER 25 MG Oral Tablet 24 Hr Take 1 tablet (25 mg total) by mouth daily. 90 tablet 3    triamcinolone 0.5 % External  Cream Use bid as directed 60 g 1    lisinopril-hydroCHLOROthiazide 20-25 MG Oral Tab Take 1 tablet by mouth daily. 90 tablet 1    albuterol 108 (90 Base) MCG/ACT Inhalation Aero Soln Inhale 1 puff into the lungs every 6 (six) hours as needed for Wheezing. 1 each 0    Budesonide-Formoterol Fumarate (SYMBICORT) 160-4.5 MCG/ACT Inhalation Aerosol Inhale 2 puffs into the lungs 2 (two) times daily. 1 each 5         ALLERGY:  No Known Allergies    PAST MEDICAL, SOCIAL AND FAMILY HISTORY:  See past medical history marked as reviewed.  See past surgical history marked as reviewed.  See past family history marked as reviewed.  See past social history marked as reviewed.    ASSESSMENTS:     REVIEW OF SYSTEMS:  Constitutional: Negative for:  fever,  fatigue, cold/heat intolerance,  weight changes  Eyes: Negative for:  Visual changes, proptosis, blurring  ENT: Negative for:  dysphagia, neck swelling, dysphonia  Respiratory: Negative for:  dyspnea, cough  Cardiovascular: Negative for:  chest pain, palpitations, orthopnea  GI: Negative for:  abdominal pain, nausea, vomiting, diarrhea, constipation, bleeding  Neurology: Negative for: headache, numbness, weakness  Genito-Urinary: Negative for: dysuria, frequency  Psychiatric: Negative for:  depression, anxiety  Hematology/Lymphatics: Negative for: bruising, lower extremity edema  Endocrine: Negative for: polyuria, polydypsia  Skin: Negative for: rash, blister, cellulitis,       PHYSICAL EXAM:  Vitals reviewed    General Appearance:  alert, well developed, in no acute distress  Head: Atraumatic  Eyes:  normal conjunctivae, sclera., normal sclera and normal pupils  Throat/Neck: normal sound to voice. Normal hearing, normal speech  Respiratory:  Speaking in full sentences, non-labored. no increased work of breathing, no audible wheezing    Neuro: motor grossly intact, moving all extremities without difficulty  Psychiatric:  oriented to time, self, and place  Extremities: Feb 2025    Bilateral barefoot skin diabetic exam is normal, visualized feet and the appearance is normal.  Bilateral monofilament/sensation of both feet is normal.  Pulsation pedal pulse exam of both lower legs/feet is normal as well.          ASSESSMENT AND PLAN:      1. Type 2 DM  A1c is 6.3 % ( 2/2025)   -Reviewed the pathogenesis, natural course of diabetes. Patient understands the importance of glycemic control and the implications of uncontrolled diabetes including Diabetic ketoacidosis and various micro vascular and macrovascular complications.          - Mounjaro  15 mg subcutaneous weekly  No personal or family history of MEN syndrome  Patient counselled regarding side effects including injection site reactions, nausea, vomiting, diarrhea, pancreatitis, gastroparesis and rare side effect sintia Patrick syndrome.          Check and call with BG as discussed     Last Ur Ma normal       2. .Dyslipidemia  LDL is slightly high   Has not been compliant with statin  Will work on this  Discussed ways to ensure compliance  C/w  rosuvastatin 20 mg daily   -Reviewed importance of low fat diet  Daily exercise  3. Hypothyroidism  TSH is normal  LT4 137 mcg daily   Reviewed compliance and admnistration    Patient verbalized a complete  understanding of all of the above and did not have any further questions.       RTC in 4-5 months

## 2025-02-25 ENCOUNTER — LAB ENCOUNTER (OUTPATIENT)
Dept: LAB | Age: 70
End: 2025-02-25
Attending: INTERNAL MEDICINE
Payer: COMMERCIAL

## 2025-02-25 DIAGNOSIS — E78.00 PURE HYPERCHOLESTEROLEMIA: ICD-10-CM

## 2025-02-25 DIAGNOSIS — E03.9 ACQUIRED HYPOTHYROIDISM: ICD-10-CM

## 2025-02-25 DIAGNOSIS — E87.6 LOW BLOOD POTASSIUM: Primary | ICD-10-CM

## 2025-02-25 DIAGNOSIS — E87.6 LOW BLOOD POTASSIUM: ICD-10-CM

## 2025-02-25 LAB — POTASSIUM SERPL-SCNC: 3.1 MMOL/L (ref 3.5–5.1)

## 2025-02-25 PROCEDURE — 84132 ASSAY OF SERUM POTASSIUM: CPT

## 2025-02-25 PROCEDURE — 36415 COLL VENOUS BLD VENIPUNCTURE: CPT

## 2025-02-25 RX ORDER — LEVOTHYROXINE SODIUM 150 UG/1
150 TABLET ORAL
Qty: 90 TABLET | Refills: 0 | Status: SHIPPED | OUTPATIENT
Start: 2025-02-25

## 2025-02-25 RX ORDER — ROSUVASTATIN CALCIUM 40 MG/1
40 TABLET, COATED ORAL NIGHTLY
Qty: 90 TABLET | Refills: 0 | Status: SHIPPED | OUTPATIENT
Start: 2025-02-25

## 2025-02-25 NOTE — TELEPHONE ENCOUNTER
Faxed pre-op clearance to [facility] to fax number [phone]. Confirmation has been received that fax went through successfully.

## 2025-02-26 ENCOUNTER — TELEPHONE (OUTPATIENT)
Dept: INTERNAL MEDICINE CLINIC | Facility: CLINIC | Age: 70
End: 2025-02-26

## 2025-02-26 DIAGNOSIS — E87.6 HYPOKALEMIA: Primary | ICD-10-CM

## 2025-02-26 RX ORDER — POTASSIUM CHLORIDE 750 MG/1
10 TABLET, EXTENDED RELEASE ORAL DAILY
Qty: 90 TABLET | Refills: 1 | Status: SHIPPED | OUTPATIENT
Start: 2025-02-26

## 2025-03-04 NOTE — TELEPHONE ENCOUNTER
Discharge Summary  Multiple Sessions    Client Name: Yordan Enamorado MRN#: [id number] YOB: 1953    Discharge Date:   March 4, 2025    Service Modality: Video Visit:      Provider verified identity through the following two step process.  Patient provided:  Patient is known previously to provider    Telemedicine Visit: The patient's condition can be safely assessed and treated via synchronous audio and visual telemedicine encounter.      Reason for Telemedicine Visit: Services only offered telehealth    Originating Site (Patient Location): Patient's home    Distant Site (Provider Location): Provider Remote Setting- Home Office    Consent:  The patient/guardian has verbally consented to: the potential risks and benefits of telemedicine (video visit) versus in person care; bill my insurance or make self-payment for services provided; and responsibility for payment of non-covered services.     Patient would like the video invitation sent by:  My Chart    Mode of Communication:  Video Conference via [employer]    Distant Location (Provider):  Off-site    As the provider I attest to compliance with applicable laws and regulations related to telemedicine.    Service Type: Individual      Session Start Time: 10:00 AM  Session End Time: 10:38 AM      Session Length: 38 minutes     Session #: 12     Attendees: Client attended alone      Focus of Treatment Objective(s):  Client's presenting concerns included: Adjustment Difficulties related to: family concerns  Phase 1 & 2 of EMDR therapy  Stage of Change at time of Discharge: ACTION (Actively working towards change)    Medication Adherence:  NA    Chemical Use:  NA    Assessment: Current Emotional / Mental Status (status of significant symptoms):    Risk status (Self / Other harm or suicidal ideation)  Client denies current fears or concerns for personal safety.  Client denies current or recent suicidal ideation or behaviors.  Client denies  Patient indicates new labs were completed by her new pcp Dr. Adolfo Tobias and would like for Dr. Danial Tao to review results from recent labs. Patient has concerns on her levels. Please call at [phone]. current or recent homicidal ideation or behaviors.  Client denies current or recent self injurious behavior or ideation.  Client denies other safety concerns.  Recommended that patient call 911 or go to the local ED should there be a change in any of these risk factors    Appearance:   Appropriate   Eye Contact:   Good   Psychomotor Behavior: Normal   Attitude:   Cooperative  Interested Pleasant  Orientation:   All  Speech   Rate / Production: Normal    Volume:  Normal   Mood:    Anxious  Sad   Affect:    Mood Congruent   Thought Content:  Clear   Thought Form:  Coherent  Goal Directed  Logical   Insight:   Good     DSM5 Diagnoses: (Sustained by DSM5 Criteria Listed Above)  Diagnoses:  Adjustment Disorders  309.24 (F43.22) With anxiety  Psychosocial & Contextual Factors:  with history of addiction  Assessments Completed:  The following assessments were completed by patient for this visit:  PROMIS 10-Global Health (only subscores and total score):       11/7/2023    10:01 AM 4/30/2024     9:59 AM 8/31/2024    11:41 AM 9/12/2024     9:59 AM 3/3/2025     4:29 PM   PROMIS-10 Scores Only   Global Mental Health Score 14 15 15 16 16    Global Physical Health Score 16 18 18 18 19    PROMIS TOTAL - SUBSCORES 30 33 33 34 35        Patient-reported       Reason for Discharge:  Client request; plan to engage in couple's therapy      Aftercare Plan:  Client may resume counseling services at any time in the future by calling the [facility] Intake Office, [phone].      Emilee Marcelo, KEZIA  March 4, 2025

## 2025-03-10 ENCOUNTER — MED REC SCAN ONLY (OUTPATIENT)
Dept: INTERNAL MEDICINE CLINIC | Facility: CLINIC | Age: 70
End: 2025-03-10

## 2025-04-12 DIAGNOSIS — E03.9 ACQUIRED HYPOTHYROIDISM: ICD-10-CM

## 2025-04-15 ENCOUNTER — HOSPITAL ENCOUNTER (OUTPATIENT)
Dept: MAMMOGRAPHY | Age: 70
Discharge: HOME | End: 2025-04-15
Attending: INTERNAL MEDICINE
Payer: COMMERCIAL

## 2025-04-15 DIAGNOSIS — Z12.31 ENCOUNTER FOR SCREENING MAMMOGRAM FOR BREAST CANCER: ICD-10-CM

## 2025-04-15 PROCEDURE — 77063 BREAST TOMOSYNTHESIS BI: CPT | Performed by: INTERNAL MEDICINE

## 2025-04-15 PROCEDURE — 77067 SCR MAMMO BI INCL CAD: CPT | Performed by: INTERNAL MEDICINE

## 2025-04-16 RX ORDER — LEVOTHYROXINE SODIUM 125 UG/1
TABLET ORAL
Qty: 90 TABLET | Refills: 0 | OUTPATIENT
Start: 2025-04-16

## 2025-04-16 RX ORDER — LEVOTHYROXINE SODIUM 150 UG/1
150 TABLET ORAL
Qty: 90 TABLET | Refills: 0 | OUTPATIENT
Start: 2025-04-16

## 2025-04-16 RX ORDER — LISINOPRIL AND HYDROCHLOROTHIAZIDE 20; 25 MG/1; MG/1
1 TABLET ORAL DAILY
Qty: 90 TABLET | Refills: 3 | Status: SHIPPED | OUTPATIENT
Start: 2025-04-16

## 2025-04-16 NOTE — TELEPHONE ENCOUNTER
Patients dose was changed to Levothyroxine 150mcg    Levothyroxine 150mc day supply sent to [facility] on 2025-patient to repeat labs in 3 months     Lab notes on 2025:  Her TSh is elevated so need to increase dose of her levothyroxine to 150mcg daily. #90; recheck TSh in 3mos

## 2025-04-16 NOTE — TELEPHONE ENCOUNTER
Refill passed per Clinic protocol.  Requested Prescriptions   Pending Prescriptions Disp Refills    LISINOPRIL-HYDROCHLOROTHIAZIDE 20-25 MG Oral Tab [Pharmacy Med Name: LISINOPRIL-HCTZ 20/25MG TABLETS] 90 tablet 1     Sig: Take 1 tablet by mouth daily.       Hypertension Medications Protocol Passed - 4/16/2025 10:54 AM        Passed - CMP or BMP in past 12 months        Passed - Last BP reading less than 140/90     BP Readings from Last 1 Encounters:   02/24/25 116/73               Passed - In person appointment or virtual visit in the past 12 mos or appointment in next 3 mos     Recent Outpatient Visits              1 month ago Preop general physical exam    [facility], Farhan Moralez MD    Office Visit    1 month ago Type 2 diabetes mellitus with other specified complication, without long-term current use of insulin (HCC)    [facility] [place] Georgette Rodriguez MD    Office Visit    3 months ago Sore throat    [facility], [place] Ezekiel Davis MD    Office Visit    4 months ago Post-nasal drip    [facility], Gus Abreu MD    Office Visit    4 months ago Post-nasal drip    [facility], Israel Ferrell MD    Office Visit          Future Appointments         Provider Department Appt Notes    In 1 week Max Mejia MD [facility]                     Passed - EGFRCR or GFRNAA > 50     GFR Evaluation  EGFRCR: 67 , resulted on 2/24/2025          Passed - Medication is active on med list          LEVOTHYROXINE 125 MCG Oral Tab [Pharmacy Med Name: LEVOTHYROXINE 0.125MG (125MCG) TAB] 90 tablet 0     Sig: TAKE 1 TABLET(125 MCG) BY MOUTH BEFORE BREAKFAST. STOP  MCG       Thyroid Medication Protocol Failed - 4/16/2025 10:54 AM        Failed - Last TSH value is normal     Lab  Results   Component Value Date    TSH 9.192 (H) 02/24/2025                 Failed - Medication is active on med list        Passed - TSH in past 12 months        Passed - In person appointment or virtual visit in the past 12 mos or appointment in next 3 mos     Recent Outpatient Visits              1 month ago [facility]    [facility] Visit    1 month ago Type 2 diabetes mellitus with other specified complication, without long-term current use of insulin (HCC)    [facility], Georgette Siddiqui MD    Office Visit    3 months ago Sore throat    [facility], Ezekiel Banuelos MD    Office Visit    4 months ago Post-nasal drip    [facility], Gus Abreu MD    Office Visit    4 months ago Post-nasal drip    [facility], Israel Ferrell MD    Office Visit          Future Appointments         Provider Department Appt Notes    In 1 week [facility]                       LEVOTHYROXINE 150 MCG Oral Tab [Pharmacy Med Name: LEVOTHYROXINE 0.150MG (150MCG) TAB] 90 tablet 0     Sig: TAKE 1 TABLET(150 MCG) BY MOUTH BEFORE BREAKFAST       Thyroid Medication Protocol Failed - 4/16/2025 10:54 AM        Failed - Last TSH value is normal     Lab Results   Component Value Date    TSH 9.192 (H) 02/24/2025                 Passed - TSH in past 12 months        Passed - In person appointment or virtual visit in the past 12 mos or appointment in next 3 mos     Recent Outpatient Visits              1 month ago [facility]op general physical exam    [facility]    Office Visit    1 month ago Type 2 diabetes mellitus with other specified complication, without long-term current use  of insulin (HCC)    [facility] [place]    Office Visit    3 months ago Sore throat    [facility], [facility] [place] Ezekiel Davis MD    Office Visit    4 months ago Post-nasal drip    [facility], Gus Abreu MD    Office Visit    4 months ago Post-nasal drip    [facility], Israel Ferrell MD    Office Visit          Future Appointments         Provider Department Appt Notes    In 1 week [facility]                     Passed - Medication is active on med list

## 2025-04-23 ENCOUNTER — OFFICE VISIT (OUTPATIENT)
Dept: INTERNAL MEDICINE CLINIC | Facility: CLINIC | Age: 70
End: 2025-04-23
Payer: COMMERCIAL

## 2025-04-23 VITALS
DIASTOLIC BLOOD PRESSURE: 77 MMHG | TEMPERATURE: 98 F | HEART RATE: 80 BPM | BODY MASS INDEX: 44.55 KG/M2 | OXYGEN SATURATION: 98 % | HEIGHT: 59 IN | SYSTOLIC BLOOD PRESSURE: 115 MMHG | WEIGHT: 221 LBS

## 2025-04-23 DIAGNOSIS — E03.9 HYPOTHYROIDISM, UNSPECIFIED TYPE: Primary | ICD-10-CM

## 2025-04-23 DIAGNOSIS — J01.90 ACUTE BACTERIAL SINUSITIS: ICD-10-CM

## 2025-04-23 DIAGNOSIS — B96.89 ACUTE BACTERIAL SINUSITIS: ICD-10-CM

## 2025-04-23 PROCEDURE — 3078F DIAST BP <80 MM HG: CPT | Performed by: STUDENT IN AN ORGANIZED HEALTH CARE EDUCATION/TRAINING PROGRAM

## 2025-04-23 PROCEDURE — 99214 OFFICE O/P EST MOD 30 MIN: CPT | Performed by: STUDENT IN AN ORGANIZED HEALTH CARE EDUCATION/TRAINING PROGRAM

## 2025-04-23 PROCEDURE — 3008F BODY MASS INDEX DOCD: CPT | Performed by: STUDENT IN AN ORGANIZED HEALTH CARE EDUCATION/TRAINING PROGRAM

## 2025-04-23 PROCEDURE — 3074F SYST BP LT 130 MM HG: CPT | Performed by: STUDENT IN AN ORGANIZED HEALTH CARE EDUCATION/TRAINING PROGRAM

## 2025-04-23 RX ORDER — FLUTICASONE PROPIONATE 50 MCG
2 SPRAY, SUSPENSION (ML) NASAL DAILY
Qty: 1 EACH | Refills: 11 | Status: SHIPPED | OUTPATIENT
Start: 2025-04-23

## 2025-04-23 RX ORDER — LEVOTHYROXINE SODIUM 150 UG/1
150 TABLET ORAL
Qty: 90 TABLET | Refills: 1 | Status: SHIPPED | OUTPATIENT
Start: 2025-04-23

## 2025-04-23 RX ORDER — AZELASTINE HYDROCHLORIDE 137 UG/1
1-2 SPRAY, METERED NASAL 2 TIMES DAILY
Qty: 30 ML | Refills: 11 | Status: SHIPPED | OUTPATIENT
Start: 2025-04-23

## 2025-04-23 RX ORDER — METHYLPREDNISOLONE 4 MG/1
TABLET ORAL
Qty: 1 EACH | Refills: 0 | Status: SHIPPED | OUTPATIENT
Start: 2025-04-23

## 2025-04-23 RX ORDER — LEVOTHYROXINE SODIUM 125 UG/1
TABLET ORAL
COMMUNITY
Start: 2025-04-16 | End: 2025-04-23

## 2025-04-23 NOTE — PROGRESS NOTES
OFFICE NOTE       The following individual(s) verbally consented to be recorded using ambient AI listening technology and understand that they can each withdraw their consent to this listening technology at any point by asking the clinician to turn off or pause the recording:    Patient name: Jing Irving  Additional names:            Patient ID: Jing Irving is a 69 year old female.  Today's Date: 04/23/25  Chief Complaint: Sore Throat (Sore throat, cough, headache x3 days)      History of Present Illness  Ms. Jing Irving is a 69-year-old female with hypothyroidism who presents with sore throat, cough, and headache.    She has been experiencing a sore throat, cough, and headache for the past three days. The symptoms are primarily concentrated in the head. She recalls a similar episode in the past where nasal sprays, antibiotics, and lozenges were effective in alleviating her symptoms.    She has a history of hypothyroidism and feels drained and has no energy, which she attributes to her thyroid condition. Her current medication for hypothyroidism is levothyroxine, taken at a dose of 150 mcg daily. She mentions that her thyroid levels are suboptimal.    She experiences leg pain, which she associates with muscle strain from walking at work. 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 No chest symptoms are present, indicating the symptoms are more head-related.       Vitals:    04/23/25 0902   BP: 115/77   Pulse: 80   Temp: 98.3 °F (36.8 °C)   TempSrc: Tympanic   SpO2: 98%   Weight: 221 lb (100.2 kg)   Height: 4' 11\" (1.499 m)     body mass index is 44.64 kg/m².  BP Readings from Last 3 Encounters:   04/23/25 115/77   02/24/25 116/73   02/24/25 120/70     The 10-year ASCVD risk score (Mindy TAI, et al., 2019) is: 14.1%    Values used to calculate the score:      Age: 69 years      Sex: Female      Is Non-   American: No      Diabetic: Yes      Tobacco smoker: No      Systolic Blood Pressure: 115 mmHg      Is BP treated: Yes      HDL Cholesterol: 44 mg/dL      Total Cholesterol: 172 mg/dL  Results         Medications reviewed:  Current Medications[1]      Assessment & Plan    1. Hypothyroidism, unspecified type (Primary)  -     Levothyroxine Sodium; Take 1 tablet (150 mcg total) by mouth before breakfast.  Dispense: 90 tablet; Refill: 1  -     TSH W Reflex To Free T4; Future; Expected date: 10/23/2025  2. Acute bacterial sinusitis  -     Amoxicillin-Pot Clavulanate; Take 1 tablet by mouth 2 (two) times daily for 10 days.  Dispense: 20 tablet; Refill: 0  -     Azelastine HCl; 1-2 sprays by Nasal route in the morning and 1-2 sprays before bedtime. FOR SINUS SYMPTOMS/NASAL CONGESTION.  Dispense: 30 mL; Refill: 11  -     Fluticasone Propionate; 2 sprays by Each Nare route daily. FOR NASAL CONGESTION/SINUS SYMPTOMS.  Dispense: 1 each; Refill: 11  -     Benzocaine-Menthol; Take 1 lozenge by mouth every 2 (two) hours as needed for Pain.  Dispense: 20 lozenge; Refill: 0  -     methylPREDNISolone; Take as directed with food.  Dispense: 1 each; Refill: 0    Assessment & Plan  Acute bacterial sinusitis  Symptoms include sore throat, headache, and fatigue. Previous treatments were effective.  - Prescribe Amoxicillin clavulanate (Augmentin).  - Prescribe nasal sprays.  - Prescribe Benzocaine lozenges.  - Provide a steroid pack if needed.  - Advise time off work until April 25, 2025.    Hypothyroidism  Suboptimal thyroid levels with fatigue and lack of energy.  - Increase levothyroxine to 150 mcg daily.  - Provide a 90-day supply with one refill.  - Schedule follow-up in six months to recheck thyroid levels.       Follow Up: As needed/if symptoms worsen or Return in about 6 months (around 10/23/2025) for Follow up on thyorid levels ..     Objective/ Results:   Physical Exam  Constitutional:       Appearance: She is well-developed.    HENT:      Nose: Congestion (bilateraly) and rhinorrhea present.      Right Turbinates: Enlarged and swollen.      Left Turbinates: Enlarged and swollen.   Cardiovascular:      Rate and Rhythm: Normal rate and regular rhythm.      Heart sounds: Normal heart sounds.   Pulmonary:      Effort: Pulmonary effort is normal.      Breath sounds: Normal breath sounds.   Abdominal:      General: Bowel sounds are normal.      Palpations: Abdomen is soft.   Skin:     General: Skin is warm and dry.   Neurological:      Mental Status: She is alert and oriented to person, place, and time.      Deep Tendon Reflexes: Reflexes are normal and symmetric.        Physical Exam  CHEST: Lungs normal     Reviewed:    Patient Active Problem List    Diagnosis    Cervicalgia    Shoulder impingement syndrome, right    Stress    Increased appetite    Age-related nuclear cataract of both eyes    Type 2 diabetes mellitus without retinopathy ([facility])    Encounter for therapeutic drug monitoring    Binge eating    ROGELIO (obstructive sleep apnea)    Morbid obesity with BMI of 45.0-49.9, adult ([facility])    Pain of right thumb    Right wrist pain    Ulnar impaction syndrome    Primary osteoarthritis of first carpometacarpal joint of right hand    Degenerative arthritis of finger    Ganglion of right hand    COPD (chronic obstructive pulmonary disease) ([facility])    Blepharitis    Dry eyes, bilateral    Hypothyroid    Essential hypertension    Obesity    Hypercholesterolemia      Allergies[2]   Short Social Hx on File[3]   Review of Systems   Constitutional:  Positive for fatigue and fever.   HENT:  Positive for congestion, postnasal drip, rhinorrhea, sinus pressure and sinus pain.    Respiratory: Negative.     Cardiovascular: Negative.    Gastrointestinal: Negative.    Skin: Negative.    Neurological: Negative.        All other systems negative unless otherwise stated in ROS or HPI above.       Israel Guadalupe MD  Internal Medicine       Call office with any  questions or seek emergency care if necessary.   Patient understands and agrees to follow directions and advice.      ----------------------------------------- PATIENT INSTRUCTIONS-----------------------------------------     There are no Patient Instructions on file for this visit.        The 21st Century Cures Act makes medical notes available to patients in the interest of transparency.  However, please be advised that this is a medical document.  It is intended as a peer to peer communication.  It is written in medical language and may contain abbreviations or verbiage that are technical and unfamiliar.  It may appear blunt or direct.  Medical documents are intended to carry relevant information, facts as evident, and the clinical opinion of the practitioner.          [1]   Current Outpatient Medications   Medication Sig Dispense Refill    levothyroxine 150 MCG Oral Tab Take 1 tablet (150 mcg total) by mouth before breakfast. 90 tablet 1    amoxicillin clavulanate 875-125 MG Oral Tab Take 1 tablet by mouth 2 (two) times daily for 10 days. 20 tablet 0    azelastine 137 MCG/SPRAY Nasal Solution 1-2 sprays by Nasal route in the morning and 1-2 sprays before bedtime. FOR SINUS SYMPTOMS/NASAL CONGESTION. 30 mL 11    fluticasone propionate 50 MCG/ACT Nasal Suspension 2 sprays by Each Nare route daily. FOR NASAL CONGESTION/SINUS SYMPTOMS. 1 each 11    Benzocaine-Menthol 6-10 MG Mouth/Throat Lozenge Take 1 lozenge by mouth every 2 (two) hours as needed for Pain. 20 lozenge 0    methylPREDNISolone 4 MG Oral Tablet Therapy Pack Take as directed with food. 1 each 0    lisinopril-hydroCHLOROthiazide 20-25 MG Oral Tab Take 1 tablet by mouth daily. 90 tablet 3    Potassium Chloride ER 10 MEQ Oral Tab CR Take 1 tablet (10 mEq total) by mouth daily. 90 tablet 1    rosuvastatin 40 MG Oral Tab Take 1 tablet (40 mg total) by mouth nightly. 90 tablet 0    azelastine 137 MCG/SPRAY Nasal Solution 1-2 sprays by Nasal route in the  morning and 1-2 sprays before bedtime. FOR SINUS SYMPTOMS/NASAL CONGESTION.. 30 mL 11    metoprolol succinate ER 25 MG Oral Tablet 24 Hr Take 1 tablet (25 mg total) by mouth daily. 90 tablet 3    albuterol 108 (90 Base) MCG/ACT Inhalation Aero Soln Inhale 1 puff into the lungs every 6 (six) hours as needed for Wheezing. 1 each 0    Budesonide-Formoterol Fumarate (SYMBICORT) 160-4.5 MCG/ACT Inhalation Aerosol Inhale 2 puffs into the lungs 2 (two) times daily. 1 each 5    Tirzepatide (MOUNJARO) 15 MG/0.5ML Subcutaneous Solution Auto-injector Inject 15 mg into the skin once a week. (Patient not taking: Reported on 2025) 6 mL 1    PHENTERMINE HCL 37.5 MG Oral Tab TAKE 1 TABLET(37.5 MG) BY MOUTH EVERY MORNING BEFORE BREAKFAST (Patient not taking: Reported on 2025) 90 tablet 0    Fluocinolone Acetonide Scalp (DERMA-SMOOTHE/FS SCALP) 0.01 % External Oil Use qhs as directed for scalp psoriasis 118 mL 2    triamcinolone 0.5 % External Cream Use bid as directed 60 g 1   [2] No Known Allergies  [3]   Social History  Socioeconomic History    Marital status:    Occupational History    Occupation: office   Tobacco Use    Smoking status: Former     Current packs/day: 0.00     Average packs/day: 0.5 packs/day for 7.0 years (3.5 ttl pk-yrs)     Types: Cigarettes     Start date: 1993     Quit date: 2000     Years since quittin.7     Passive exposure: Past    Smokeless tobacco: Never    Tobacco comments:     quit smoking     Vaping Use    Vaping status: Never Used   Substance and Sexual Activity    Alcohol use: Not Currently     Alcohol/week: 1.0 standard drink of alcohol     Types: 1 Glasses of wine per week     Comment: social - monthly    Drug use: No    Sexual activity: Yes     Partners: Male     Comment: none   Other Topics Concern    Grew up on a farm No    History of tanning Yes    Outdoor occupation No    Pt has a pacemaker No    Pt has a defibrillator No    Breast feeding No    Reaction to  local anesthetic No    Exercise No

## 2025-04-25 ENCOUNTER — TELEPHONE (OUTPATIENT)
Dept: INTERNAL MEDICINE CLINIC | Facility: CLINIC | Age: 70
End: 2025-04-25

## 2025-04-25 NOTE — TELEPHONE ENCOUNTER
Patient called stating they need a revised return to work letter , that needs to list all the dates involving the illness.     Patient is requesting these dates on the note with a signature from Dr. Guadalupe.     4/22 - 4/24, may return to work on the 4/25

## 2025-05-28 ENCOUNTER — HOSPITAL ENCOUNTER (OUTPATIENT)
Dept: GENERAL RADIOLOGY | Age: 70
Discharge: HOME | End: 2025-05-28
Attending: INTERNAL MEDICINE
Payer: COMMERCIAL

## 2025-05-28 ENCOUNTER — OFFICE VISIT (OUTPATIENT)
Dept: INTERNAL MEDICINE CLINIC | Facility: CLINIC | Age: 70
End: 2025-05-28
Payer: COMMERCIAL

## 2025-05-28 VITALS
BODY MASS INDEX: 46.77 KG/M2 | WEIGHT: 232 LBS | HEIGHT: 59 IN | HEART RATE: 81 BPM | DIASTOLIC BLOOD PRESSURE: 83 MMHG | SYSTOLIC BLOOD PRESSURE: 124 MMHG

## 2025-05-28 DIAGNOSIS — M79.644 PAIN OF RIGHT THUMB: Primary | ICD-10-CM

## 2025-05-28 DIAGNOSIS — M79.644 PAIN OF RIGHT THUMB: ICD-10-CM

## 2025-05-28 DIAGNOSIS — L85.3 DRY SKIN: ICD-10-CM

## 2025-05-28 DIAGNOSIS — E11.69 TYPE 2 DIABETES MELLITUS WITH OTHER SPECIFIED COMPLICATION, WITHOUT LONG-TERM CURRENT USE OF INSULIN (HCC): ICD-10-CM

## 2025-05-28 PROCEDURE — 3079F DIAST BP 80-89 MM HG: CPT | Performed by: INTERNAL MEDICINE

## 2025-05-28 PROCEDURE — 73140 X-RAY EXAM OF FINGER(S): CPT | Performed by: INTERNAL MEDICINE

## 2025-05-28 PROCEDURE — 99213 OFFICE O/P EST LOW 20 MIN: CPT | Performed by: INTERNAL MEDICINE

## 2025-05-28 PROCEDURE — 3008F BODY MASS INDEX DOCD: CPT | Performed by: INTERNAL MEDICINE

## 2025-05-28 PROCEDURE — 3074F SYST BP LT 130 MM HG: CPT | Performed by: INTERNAL MEDICINE
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
## 2025-06-02 ENCOUNTER — TELEPHONE (OUTPATIENT)
Dept: INTERNAL MEDICINE CLINIC | Facility: CLINIC | Age: 70
End: 2025-06-02

## 2025-06-02 DIAGNOSIS — M15.4 EROSIVE (OSTEO)ARTHRITIS: Primary | ICD-10-CM

## 2025-07-21 ENCOUNTER — LAB ENCOUNTER (OUTPATIENT)
Dept: LAB | Age: 70
End: 2025-07-21
Attending: STUDENT IN AN ORGANIZED HEALTH CARE EDUCATION/TRAINING PROGRAM
Payer: COMMERCIAL

## 2025-07-21 DIAGNOSIS — E78.00 PURE HYPERCHOLESTEROLEMIA: ICD-10-CM

## 2025-07-21 DIAGNOSIS — E03.9 ACQUIRED HYPOTHYROIDISM: ICD-10-CM

## 2025-07-21 DIAGNOSIS — E87.6 HYPOKALEMIA: ICD-10-CM

## 2025-07-21 LAB
ALBUMIN SERPL-MCNC: 4.5 G/DL (ref 3.2–4.8)
ALBUMIN/GLOB SERPL: 1.8 {RATIO} (ref 1–2)
ALP LIVER SERPL-CCNC: 77 U/L (ref 55–142)
ALT SERPL-CCNC: 13 U/L (ref 10–49)
ANION GAP SERPL CALC-SCNC: 7 MMOL/L (ref 0–18)
AST SERPL-CCNC: 17 U/L (ref ?–34)
BILIRUB SERPL-MCNC: 0.8 MG/DL (ref 0.2–1.1)
BUN BLD-MCNC: 15 MG/DL (ref 9–23)
BUN/CREAT SERPL: 15.5 (ref 10–20)
CALCIUM BLD-MCNC: 9 MG/DL (ref 8.7–10.4)
CHLORIDE SERPL-SCNC: 103 MMOL/L (ref 98–112)
CHOLEST SERPL-MCNC: 193 MG/DL (ref ?–200)
CO2 SERPL-SCNC: 30 MMOL/L (ref 21–32)
CREAT BLD-MCNC: 0.97 MG/DL (ref 0.55–1.02)
EGFRCR SERPLBLD CKD-EPI 2021: 64 ML/MIN/1.73M2 (ref 60–?)
FASTING PATIENT LIPID ANSWER: YES
FASTING STATUS PATIENT QL REPORTED: YES
GLOBULIN PLAS-MCNC: 2.5 G/DL (ref 2–3.5)
GLUCOSE BLD-MCNC: 118 MG/DL (ref 70–99)
HDLC SERPL-MCNC: 44 MG/DL (ref 40–59)
LDLC SERPL CALC-MCNC: 122 MG/DL (ref ?–100)
NONHDLC SERPL-MCNC: 149 MG/DL (ref ?–130)
OSMOLALITY SERPL CALC.SUM OF ELEC: 292 MOSM/KG (ref 275–295)
POTASSIUM SERPL-SCNC: 3.5 MMOL/L (ref 3.5–5.1)
PROT SERPL-MCNC: 7 G/DL (ref 5.7–8.2)
SODIUM SERPL-SCNC: 140 MMOL/L (ref 136–145)
TRIGL SERPL-MCNC: 154 MG/DL (ref 30–149)
TSI SER-ACNC: 2.63 UIU/ML (ref 0.55–4.78)
VLDLC SERPL CALC-MCNC: 27 MG/DL (ref 0–30)

## 2025-07-21 PROCEDURE — 80061 LIPID PANEL: CPT

## 2025-07-21 PROCEDURE — 36415 COLL VENOUS BLD VENIPUNCTURE: CPT

## 2025-07-21 PROCEDURE — 80053 COMPREHEN METABOLIC PANEL: CPT

## 2025-07-21 PROCEDURE — 84443 ASSAY THYROID STIM HORMONE: CPT

## 2025-07-23 ENCOUNTER — OFFICE VISIT (OUTPATIENT)
Dept: INTERNAL MEDICINE CLINIC | Facility: CLINIC | Age: 70
End: 2025-07-23

## 2025-07-23 VITALS
DIASTOLIC BLOOD PRESSURE: 79 MMHG | HEART RATE: 76 BPM | OXYGEN SATURATION: 97 % | SYSTOLIC BLOOD PRESSURE: 126 MMHG | TEMPERATURE: 98 F | WEIGHT: 226 LBS | HEIGHT: 59 IN | BODY MASS INDEX: 45.56 KG/M2

## 2025-07-23 DIAGNOSIS — J32.9 SINUSITIS, UNSPECIFIED CHRONICITY, UNSPECIFIED LOCATION: ICD-10-CM

## 2025-07-23 DIAGNOSIS — J02.9 SORE THROAT: Primary | ICD-10-CM

## 2025-07-23 PROCEDURE — 87880 STREP A ASSAY W/OPTIC: CPT | Performed by: INTERNAL MEDICINE

## 2025-07-23 PROCEDURE — 3074F SYST BP LT 130 MM HG: CPT | Performed by: INTERNAL MEDICINE

## 2025-07-23 PROCEDURE — 3078F DIAST BP <80 MM HG: CPT | Performed by: INTERNAL MEDICINE

## 2025-07-23 PROCEDURE — 3008F BODY MASS INDEX DOCD: CPT | Performed by: INTERNAL MEDICINE

## 2025-07-23 PROCEDURE — 99213 OFFICE O/P EST LOW 20 MIN: CPT | Performed by: INTERNAL MEDICINE

## 2025-07-23 RX ORDER — AZITHROMYCIN 250 MG/1
TABLET, FILM COATED ORAL
Qty: 6 TABLET | Refills: 0 | Status: SHIPPED | OUTPATIENT
Start: 2025-07-23 | End: 2025-07-28

## 2025-08-04 DIAGNOSIS — E66.01 MORBID OBESITY WITH BMI OF 45.0-49.9, ADULT (HCC): ICD-10-CM

## 2025-08-04 RX ORDER — PHENTERMINE HYDROCHLORIDE 37.5 MG/1
37.5 TABLET ORAL
Qty: 30 TABLET | Refills: 0 | OUTPATIENT
Start: 2025-08-04

## 2025-08-13 ENCOUNTER — HOSPITAL ENCOUNTER (OUTPATIENT)
Dept: GENERAL RADIOLOGY | Age: 70
Discharge: HOME | End: 2025-08-13
Attending: INTERNAL MEDICINE

## 2025-08-13 ENCOUNTER — OFFICE VISIT (OUTPATIENT)
Dept: INTERNAL MEDICINE CLINIC | Facility: CLINIC | Age: 70
End: 2025-08-13

## 2025-08-13 VITALS
BODY MASS INDEX: 46.04 KG/M2 | SYSTOLIC BLOOD PRESSURE: 156 MMHG | WEIGHT: 228.38 LBS | HEART RATE: 76 BPM | OXYGEN SATURATION: 96 % | HEIGHT: 59 IN | TEMPERATURE: 98 F | DIASTOLIC BLOOD PRESSURE: 85 MMHG

## 2025-08-13 DIAGNOSIS — R09.89 RHONCHI AT LEFT LUNG BASE: ICD-10-CM

## 2025-08-13 DIAGNOSIS — J32.9 SINUSITIS, UNSPECIFIED CHRONICITY, UNSPECIFIED LOCATION: Primary | ICD-10-CM

## 2025-08-13 PROCEDURE — 3077F SYST BP >= 140 MM HG: CPT | Performed by: INTERNAL MEDICINE

## 2025-08-13 PROCEDURE — 3008F BODY MASS INDEX DOCD: CPT | Performed by: INTERNAL MEDICINE

## 2025-08-13 PROCEDURE — 71046 X-RAY EXAM CHEST 2 VIEWS: CPT | Performed by: INTERNAL MEDICINE

## 2025-08-13 PROCEDURE — 99214 OFFICE O/P EST MOD 30 MIN: CPT | Performed by: INTERNAL MEDICINE

## 2025-08-13 PROCEDURE — 3079F DIAST BP 80-89 MM HG: CPT | Performed by: INTERNAL MEDICINE

## 2025-08-13 RX ORDER — ALBUTEROL SULFATE 90 UG/1
1 INHALANT RESPIRATORY (INHALATION) EVERY 6 HOURS PRN
Qty: 3 EACH | Refills: 0 | Status: SHIPPED | OUTPATIENT
Start: 2025-08-13

## 2025-08-15 ENCOUNTER — TELEPHONE (OUTPATIENT)
Dept: INTERNAL MEDICINE CLINIC | Facility: CLINIC | Age: 70
End: 2025-08-15

## (undated) DEVICE — SET TUBI Y FL CNTRL INFL/OTFL

## (undated) DEVICE — KIT ENDO ORCAPOD 160/180/190

## (undated) DEVICE — SOL  .9 3000ML

## (undated) DEVICE — CONMED SCOPE SAVER BITE BLOCK, 20X27 MM: Brand: SCOPE SAVER

## (undated) DEVICE — 35 ML SYRINGE REGULAR TIP: Brand: MONOJECT

## (undated) DEVICE — LINE MNTR ADLT SET O2 INTMD

## (undated) DEVICE — SNARE OPTMZ PLPCTM TRP

## (undated) DEVICE — DEVICE SPEC RTRVL MYOSURE

## (undated) DEVICE — SOCK CNSTR 4IN TNPSL UNV SPEC

## (undated) DEVICE — SNARE ENDOSCOPIC 10MM ROUND

## (undated) DEVICE — MYOSURE SINGLE USE SEAL SET

## (undated) DEVICE — KIT CLEAN ENDOKIT 1.1OZ GOWNX2

## (undated) DEVICE — ENCORE® LATEX MICRO SIZE 6.5, STERILE LATEX POWDER-FREE SURGICAL GLOVE: Brand: ENCORE

## (undated) DEVICE — HYSTEROSCOPY: Brand: MEDLINE INDUSTRIES, INC.

## (undated) DEVICE — FORCEP RADIAL JAW 4

## (undated) NOTE — LETTER
3/18/2024          To Whom It May Concern:    Jing Irving is currently under my medical care and may not return to work at this time.    She may return to work on Monday March 25th, 2024 pending clinical improvement. .    If you require additional information please contact our office.        Sincerely,    Israel Guadalupe MD          Document generated by:  Israel Guadalupe MD

## (undated) NOTE — LETTER
Date: 2023      Patient Name: Agustin Hernandez      : 10/24/1955        Thank you for choosing [facility] as your health care provider. Your physician has deemed the following medical service(s) necessary. However, your insurance plan may not pay for all of your health care and costs and may deny payment for this service. The fact that your insurance plan does not pay for an item or service does not mean you should not receive it. The purpose of this form is to help you make an informed decision about whether or not you want to receive this service(s) that may not be paid for by your insurance plan. CPT Code Description     Cost     Right shoulder injection      I understand that the above mentioned service(s) or supply may not be covered by my insurance company.  I agree to be financially responsible for the cost of this service or supply in the event of my insurance denies payment as a non-covered benefit.        ______________________________________________________________________  Signature of Patient or Patient's Representative  Relationship  Date    ______________________________________________________________________  Signature of Witness to signing of form   Printed Name

## (undated) NOTE — LETTER
10/30/2019              Jing Alcazar         Dear Opal Castelan records indicate that the blood tests ordered for you by Wilbur Pettit MD  have not been done.   If you have, in fact, already completed t

## (undated) NOTE — LETTER
[facility] [address] , [place]   Date:   12/29/2023     Name:   Toi Newell    YOB: 1955   MRN:   [id number]       [facility]? Akbar the areas on your body where you feel the described sensations. Use the appropriate symbol. Kelly Bunkers the areas of radiation. Include all affected areas. Just to complete the picture, please draw in the face. ACHE:  ^ ^ ^   NUMBNESS:  0000   PINS & NEEDLES:  = = = =                              ^ ^ ^                       0000              = = = =                                    ^ ^ ^                       0000            = = = =      BURNING:  XXXX   STABBING: ////                  XXXX                ////                         XXXX          ////     Please akbar the line below indicating your degree of pain right now  with 0 being no pain 10 being the worst pain possible.                                          0             1             2              3             4              5              6              7             8             9             10         Patient Signature:

## (undated) NOTE — LETTER
12/5/2024          To Whom It May Concern:    Jing Irving is currently under my medical care date 11/21/2024  Please excuse Jing for 3 days. From dates 11/19/24 - 11/21/24  She may return to work on 11/22/2024  Activity is restricted as follows: none.    If you require additional information please contact our office.        Sincerely,    Gus Tobias MD

## (undated) NOTE — MR AVS SNAPSHOT
[address]                Thank you for choosing us for your health care visit with Chino Murphy MD.  We are glad to serve you and happy to provide you with this summary of your v Lisdexamfetamine Dimesylate 60 MG Caps   Take 1 capsule (60 mg total) by mouth every morning.    Commonly known as:  VYVANSE   Start taking on:  4/9/2017           Losartan Potassium-HCTZ 100-25 MG Tabs   TAKE 1 TABLET BY MOUTH DAILY   Commonly known as: Call [phone] for help. [employer] is NOT to be used for urgent needs. For medical emergencies, dial 911.         Educational Information     Healthy Diet and Regular Exercise  [employer] for making healthy food choices  -

## (undated) NOTE — LETTER
[address] [address]  PH: [phone]  FAX: [phone]    04/26/25            To Whom It May Concern:      Jing Irving, is currently under my medical care. Please excuse her from work the dates of 4/22/24 - 4/24/25, she may return back to worn on 4/25/25.    If you require additional information please contact our office.                Kind Regards,      Israel Guadalupe MD, MPH.

## (undated) NOTE — ED AVS SNAPSHOT
Ms. Alyson Smith   MRN: [id number]    Department:  [facility] Emergency Department   Date of Visit:  2/22/2018           Disclosure     Insurance plans vary and the physician(s) referred by the ER may not be covered by your plan.  Please conta CARE PHYSICIAN AT ONCE OR RETURN IMMEDIATELY TO THE EMERGENCY DEPARTMENT. If you have been prescribed any medication(s), please fill your prescription right away and begin taking the medication(s) as directed.   If you believe that any of the medications

## (undated) NOTE — Clinical Note
Thank you for the consult. I saw Ms. Carrol Sheehan in the endocrine/diabetes clinic today. Please see attached my note. Please feel free to contact me with any questions. Thanks!

## (undated) NOTE — LETTER
5/8/2019              Jing Alcazar         Dear Aman Rodriguez records indicate that the tests ordered for you by Sara Jones MD  have not been done.   If you have, in fact, already completed the test

## (undated) NOTE — LETTER
March 5, 2020    Bala Walters MD  [address] [facility]     Patient: Oralia Alfonso   YOB: 1955   Date of Visit: 3/5/2020       Dear Dr. Charity Nuñez MD:    Thank you for referring Oralia Alfonso to me for evaluation. colonoscopy (11/7/2011) (per NG) and hysteroscopy.     Family History   Problem Relation Age of Onset   • Hypertension Father    • Heart Disease Father    • Heart Disorder Mother    • Heart Disease Mother    • Diabetes Neg    • Glaucoma Neg    • Retinal det • Cyanocobalamin (VITAMIN B-12 OR) Take 1 tablet by mouth daily. • Rosuvastatin Calcium 20 MG Oral Tab TAKE 1 TABLET BY MOUTH DAILY (Patient taking differently: Take 20 mg by mouth nightly.  TAKE 1 TABLET BY MOUTH DAILY ) 90 tablet 1   • Vitamin B-12 10 Anterior Chamber Deep and quiet Deep and quiet    Iris Normal Normal    Lens 1-2+ Nuclear sclerosis 1-2+ Nuclear sclerosis    Vitreous Clear Clear          Fundus Exam       Right Left    Disc Good rim, Temporal crescent Good rim, Temporal crescent    C/D If you have questions, please do not hesitate to call me. I look forward to following Saeid Paige along with you.     Sincerely,        Bhavin Bee MD        CC: No Recipients    Document electronically generated by: Bhavin Bee

## (undated) NOTE — LETTER
9/30/2019              Jing Alcazar         Dear Criss Hutchins records indicate that the repeat VITAMIN D blood test ordered for you by Pablo Doe MD have not been done.   If you have, in fact, alre

## (undated) NOTE — MR AVS SNAPSHOT
[facility] [address]                Thank you for choosing us for your health care visit with Cassidy Friend MD.  We are glad to serve you and happy to provide you with this summary of your v Lisdexamfetamine Dimesylate 60 MG Caps   Take 1 capsule (60 mg total) by mouth every morning.    Commonly known as:  VYVANSE   Start taking on:  7/1/2017           Losartan Potassium-HCTZ 100-25 MG Tabs   TAKE 1 TABLET BY MOUTH DAILY   Commonly known as: [email or web address]

## (undated) NOTE — LETTER
10/13/2023              Jing [address]       To whom it may concern,      This is to certify that Bonnie Rascon was evaluated in my clinic today. She should be excused from work starting Oct 16, 2023 to Oct  21, 2023 due to medical illness.        Sincerely,    Kera Smith MD  [facility] [address]  [phone]        Document electronically generated by:  Kera Smith MD

## (undated) NOTE — Clinical Note
Will start Metformin 500 mg BID for her elevated A1C. May help with weight management as well.  I also agree she needs another sleep study

## (undated) NOTE — LETTER
4/23/2025          To Whom It May Concern:    Jing Irving is currently under my medical care and may not return to work at this time.    She may return to work on Friday, April 25th 2025.    If you require additional information please contact our office.        Sincerely,    Israel Guadalupe MD          Document generated by:  Israel Guadalupe MD

## (undated) NOTE — LETTER
3/16/2022              Jing Saldañajewels        [address] [place] [id number]         To Whom It May Concern:    Shearon Heimlich is currently under my medical care and may not return to work at this time. Please excuse Glenny Garcia from 3/15/22 to 3/18/22. She may return to work on 3/19/2022. If you require additional information please contact our office.       Sincerely,    Rickie Forte MD  [phone]            Document electronically generated by:  Sunil Marsh RN

## (undated) NOTE — LETTER
[facility]  Administration of Anesthesia  1. Paula Downing, or _________________________________ acting on her behalf, (Patient) (Dependent/Representative) request to receive anesthesia for my pending procedure/operation/treatment. A physician (anesthesiologist) alone or an anesthesiologist working with a nurse anesthetist may administer my anesthesia. 2. I understand that my anesthesiologist is not an employee or agent of the hospital, but is an independent medical practitioner who has been permitted to use its facilities for the care and treatment of his/her patients. 3. I acknowledge that a physician from [facility] or their designate(s), recommended anesthesia for me using her/his medical judgment. The type(s) of anesthesia I may receive include:                a) General Anesthesia, b) Spinal/Epidural Anesthesia, c) Regional Anesthesia or d) Monitored Anesthesia Care. 4. If my spinal, regional or monitored anesthesia care (local) is not satisfactory for my comfort, or if my medical condition requires, I consent to the administration of general anesthesia. 5. I am aware that the practice of anesthesiology is not an exact science and that some foreseeable risks or consequences may occur. Some common risks/consequences include sore throat and hoarseness, nausea and vomiting, muscle soreness, backache, damage to the mouth/teeth/vocal cords and eye injury. I understand that more rare but serious potential risks of anesthesia include blood pressure changes, drug reactions, cardiac arrest, brain damage, paralysis or death. These risks apply to whether I have general, spinal/epidural, regional or monitored anesthesia care. 6. OBSTETRIC PATIENTS: Specific risks/consequences of spinal/epidural anesthesia may include itching, low blood pressure, difficulty urinating, slowing of the baby's heart rate and headache.  Rare risks include infections, high spinal block, spinal bleeding, seizure, cardiac arrest and death. 7. AWARENESS: I understand that it is possible (but unlikely) to have explicit memory of events from the operating room while under general anesthesia. 8. ELECTROCONVULSIVE THERAPY PATIENTS: This consent serves for all treatments in a single course of therapy. 9. I understand that I must inform my anesthesiologist when I last ate and/or drank to minimize the risk of anesthesia. 10. If I am pregnant, or may pregnant, I understand that elective surgery should be postponed until after the baby is born. Anesthetics cross the placenta and may temporarily anesthetize the baby. Although fetal complications of anesthesia during pregnancy are rare, they may include birth defects, premature labor, brain damage and death. 11. I certify that I informed the anesthesiologist, to the best of my ability, about medication I take including blood thinners, anticoagulants, herbal remedies, narcotics and recreational drugs (e.g. cocaine, marijuana, PCP). Failure to inform my anesthesiologist about these medicines may increase my risk of anesthetic complications. The nature and purpose of my anesthetic management was explained to me. I had the opportunity to ask questions and the answers and information provided meet my satisfaction.   I retain the right to withdraw this consent at any time prior to the administration of said anesthetic.    ___________________________________________________           _____________________________________________________  Patient Signature                                                                                      Witness Signature                ___________________________________________________           _____________________________________________________  Date/Time                                                                                               Responsible person in case of minor/ unconscious pt /Relationship    My signature below affirms that prior to the time of the procedure, I have explained to the patient and/or his/her guardian, the risks and benefits of undergoing anesthesia, as well as any reasonable alternatives.     ___________________________________________________            _____________________________________________________  Physician Signature                            Date/Time  Patient Name: Efra Alamo     : 10/24/1955     Printed: 2022      Medical Record #: [id number]                              Page 1 of 1    ----------ANESTHESIA CONSENT----------

## (undated) NOTE — LETTER
9/30/2019              Jing Alcazar         Dear Idris Mendoza records indicate that the tests ordered for you by Faiza Hopson MD  have not been done.   If you have, in fact, already completed the rahul

## (undated) NOTE — LETTER
[facility] [address] , [place]   Date:   8/2/2023     Name:   Marilyn Galvez    YOB: 1955   MRN:   [id number]       [facility]? Akbar the areas on your body where you feel the described sensations. Use the appropriate symbol. Leodis Kiran the areas of radiation. Include all affected areas. Just to complete the picture, please draw in the face. ACHE:  ^ ^ ^   NUMBNESS:  0000   PINS & NEEDLES:  = = = =                              ^ ^ ^                       0000              = = = =                                    ^ ^ ^                       0000            = = = =      BURNING:  XXXX   STABBING: ////                  XXXX                ////                         XXXX          ////     Please akbar the line below indicating your degree of pain right now  with 0 being no pain 10 being the worst pain possible.                                          0             1             2              3             4              5              6              7             8             9             10         Patient Signature:

## (undated) NOTE — LETTER
January 16, 2018    Humble Varela MD  [address] [facility]     Patient: Nabil Daniel   YOB: 1955   Date of Visit: 1/16/2018       Dear Dr. Rhea Rider MD:    Thank you for referring Nabil Daniel to me for evaluati • Macular degeneration Neg    • Cancer Neg        Social History: Smoking status: Former Smoker                                                              Packs/day: 0.00      Years: 7.00         Types: Cigarettes     Quit date: 7/29/2000  Smokeless toba daily Disp: 1 kit Rfl: 0   Glucose Blood (MORENO CONTOUR NEXT TEST) In Vitro Strip Check blood sugar 1 time daily Disp: 100 strip Rfl: 1   MORENO MICROLET LANCETS Does not apply Misc Use 1 lancet daily to check blood sugar Disp: 100 each Rfl: 1   Rosuvastati Lens 1+ Nuclear sclerosis 1+ Nuclear sclerosis    Vitreous Clear Clear          Fundus Exam       Right Left    Disc Good rim, Temporal crescent Good rim, Temporal crescent    C/D Ratio 0.2 0.2    Macula Normal- no BDR Normal- no BDR    Vessels Normal Nor If you have questions, please do not hesitate to call me. I look forward to following Annia Shore along with you.     Sincerely,        Cha Centeno MD        CC: No Recipients    Document electronically generated by: Cha Centeno

## (undated) NOTE — Clinical Note
Dear Elsy Medrano,  I had the opportunity to see your patient Bonnie Rascon recently. I appreciate your confidence in me to care for your patients. Please feel free call me with any questions at [phone] or contact me through Maximo.   Sincerely, Kiel Taylor MD Board Certified, Physical Medicine and Rehabilitation Specializing in [facility], Spine Medicine and [facility]  CC: Dr. Carlos Perez

## (undated) NOTE — LETTER
AUTHORIZATION FOR SURGICAL OPERATION OR OTHER PROCEDURE    1. I hereby authorize Shivam Prado  and [facility] staff assigned to my case to perform the following operation and/or procedure at the [facility]:    Cortisone injection in Right shoulder   _______________________________________________________________________________________________      _______________________________________________________________________________________________    2.  My physician has explained the nature and purpose of the operation or other procedure, possible alternative methods of treatment, the risks involved, and the possibility of complication to me.  I acknowledge that no guarantee has been made as to the result that may be obtained.  3.  I recognize that, during the course of this operation, or other procedure, unforseen conditions may necessitate additional or different procedure than those listed above.  I, therefore, further authorize and request that the above named physician, his/her physician assistants or designees perform such procedures as are, in his/her professional opinion, necessary and desirable.  4.  Any tissue or organs removed in the operation or other procedure may be disposed of by and at the discretion of the [facility] and [facility].  5.  I understand that in the event of a medical emergency, I will be transported by local paramedics to [facility] or other hospital emergency department.  6.  I certify that I have read and fully understand the above consent to operation and/or other procedure.    7.  I acknowledge that my physician has explained sedation/analgesia administration to me including the risks and benefits.  I consent to the administration of sedation/analgesia as may be necessary or desirable in the judgement of my physician.    Witness signature: ___________________________________________________ Date:   ______/______/_____                    Time:  ________ A.M.  P.M.       Patient Name:  ______________________________________________________  (please print)      Patient signature:  ___________________________________________________             Relationship to Patient:           []  Parent    Responsible person                          []  Spouse  In case of minor or                    [] Other  _____________   Incompetent name:  __________________________________________________                               (please print)      _____________      Responsible person  In case of minor or  Incompetent signature:  _______________________________________________    Statement of Physician  My signature below affirms that prior to the time of the procedure, I have explained to the patient and/or his/her guardian, the risks and benefits involved in the proposed treatment and any reasonable alternative to the proposed treatment.  I have also explained the risks and benefits involved in the refusal of the proposed treatment and have answered the patient's questions.                        Date:  ______/______/_______  Provider                      Signature:  __________________________________________________________       Time:  ___________ A.M    P.M.

## (undated) NOTE — LETTER
1/3/2025              Jing Irving        [address] [facility] [address]         To Whom It May Concern,    Jing Irving is under my medical care.  Please excuse her from work for 1/3/2025 through 1/5/2025.  Jing may return to work on 1/6/2025 without restrictions.  If you have any questions feel free to call the office at your convenience.    Sincerely,          Ezekiel Davis MD          Document electronically generated by:  Ezekiel Davis MD

## (undated) NOTE — LETTER
1/2/2018              Davejayde Marcell        [address]         Dear Kourtney Sheth records indicate that the lab tests ordered for you by Don Calhoun MD  have not been done.   If you have, in fact, already completed the t

## (undated) NOTE — LETTER
6/20/2023      To Whom It May Concern:    Renetta Elena is currently under my medical care . She may return to work  on 6/21/23. Activity is restricted as follows: none     If you require additional information please contact our office.       Sincerely,    Dylan Skinner, APRDEISY  [address]   [phone]      Document generated by:  Ayala Crisostomo RN

## (undated) NOTE — LETTER
[address], [place]  Authorization for Invasive Procedures  1. I hereby authorize Dr. Lyndon Collazo , my physician and whomever may be designated as the doctor's assistant, to perform the following operation and/or procedure:  Esophagogastroduodenoscopy and Colonoscopy on Toi Newell at [facility].    2. My physician has explained to me the nature and purpose of the operation or other procedure, possible alternative methods of treatment, the risks involved and the possibility of complications to me. I understand the probable consequences of declining the recommended procedure and the alternative methods of treatment. I acknowledge that no guarantee has been made as to the result that may be obtained. 3. I recognize that during the course of this operation or other procedure, unforeseen conditions may necessitate additional or different procedures than those listed above. I, therefore, further authorize and request that the above-named physician, his/her physician assistants, or designees perform such procedures as are, in his/her professional opinion, necessary and desirable. If I have a Do Not Attempt Resuscitation (DNAR) order in place, that status will be suspended while in the operating room, procedural suite, and during the recovery period unless otherwise explicitly stated by me (or a person authorized to consent on my behalf). The surgeon or my attending physician will determine when the applicable recovery period ends for purposes of reinstating the DNAR order. 4. Should the need arise during my operation or immediate post-operative period; I also consent to the administration of blood and/or blood products.  Further, I understand that despite careful testing and screening of blood and blood products, I may still be subject to ill effects as a result of recieving a blood transfusion an/or blood producst. The following are some, but not all, of the potential risks that can occur: fever and allergic reactions, hemolytic reactions, transmission of disease such as hepatitis, AIDS, cytomegalovirus (CMV), and flluid overload. In the event that I wish to have autologous transfusions of my own blood, or a directed donor transfusion, I will discuss this with my physician. 5. I consent to the photographing of the operations or procedures to be performed for the purposes of advancing medicine, science, and/or education, provided my identity is not revealed. If the procedure has been videotaped, the physician/surgeon will obtain the original videotape. [facility] will not be responsible for storage or maintenance of this tape. 6. I consent to the presence of a  or observer as deemed necessary by my physician or his designee. 7. Any tissues or organs removed in the operation or other procedure may be disposed of by and at the discretion of [facility].    8. I understand that the physician and his/her physician assistants may not be employees or agents of [facility], [facility], nor [facility], but are independent medical practitioners who have been permitted to use its facilities for the care and treatment of their patients. 9. Patients having a sterilization procedure: I understand that if the procedure is successful the results will be permanent and it will therefore be impossible for me to inseminate, conceive or bear children. I also understand that the procedure is intended to result in sterility, although the result has not been guaranteed. 10. I CERTIFY THAT I HAVE READ AND FULLY UNDERSTAND THE ABOVE CONSENT TO OPERATION and/or OTHER PROCEDURE. 11. I acknowledge that my physician has explained sedation/analgesia administration to me including the risks and benefits.  I consent to the administration of sedation/analgesia as may be necessary or desirable in the judgment of my physician. Signature of Patient:  ________________________________________________ Date: _________Time: _________    Responsible person in case of minor or unconscious: _____________________________Relationship: ____________     Witness Signature: ____________________________________________ Date: __________ Time: ___________    Statement of Physician  My signature below affirms that prior to the time of the procedure, I have explained to the patient and/or her legal representative, the risks and benefits involved in the proposed treatment and any reasonable alternative to the proposed treatment. I have also explained the risks and benefits involved in the refusal of the proposed treatment and have answered the patient's questions. If I have a significant financial interest in this procedure/surgery, I have disclosed this and had a discussion with my patient.     Signature of Physician:   ________________________________________Date: _________Time:_______ Patient Name: Sonido Jameson  : 10/24/1955   Printed: 2022    Medical Record #: [id number]

## (undated) NOTE — LETTER
Date & Time: 10/11/2023, 1:57 PM  Patient: Perfecto Whitfield  Encounter Provider(s):    Monty Williamson MD       To Whom It May Concern:    Perfecto Whitfield was seen and treated in our department on 10/11/2023. She should not return to work until 10/16/23 .     If you have any questions or concerns, please do not hesitate to call.        _____________________________  Physician/APC Signature

## (undated) NOTE — LETTER
[address] [place] [address]  Authorization for Invasive Procedures  1.  I hereby authorize                   , my physician and whomever may be designated as the doctor's assistant, to perform the following operation and/or procedure: the event that I wish to have autologous transfusions of my own blood, or a directed donor transfusion, I will discuss this with my physician.      5. I consent to the photographing of the operations or procedures to be performed for the purposes of advanci Responsible person in case of minor or unconscious: _____________________________Relationship: ____________     Witness Signature: ____________________________________________ Date: __________ Time: ___________    Statement of Physician  My signature below

## (undated) NOTE — LETTER
[facility]   Date:   6/5/2023     Name:   Ml Mcintyre    YOB: 1955   MRN:   [id number]       [facility]? Fer the areas on your body where you feel the described sensations. Use the appropriate symbol. Jeffrey Torres the areas of radiation. Include all affected areas. Just to complete the picture, please draw in the face. ACHE:  ^ ^ ^   NUMBNESS:  0000   PINS & NEEDLES:  = = = =                              ^ ^ ^                       0000              = = = =                                    ^ ^ ^                       0000            = = = =      BURNING:  XXXX   STABBING: ////                  XXXX                ////                         XXXX          ////     Please fer the line below indicating your degree of pain right now  with 0 being no pain 10 being the worst pain possible.                                          0             1             2              3             4              5              6              7             8             9             10         Patient Signature:

## (undated) NOTE — Clinical Note
Dear Dr. Esposito Poster,  Thank you for sending Sonido Jameson to see me for physiatry consultation. I appreciate your confidence in me to care for your patients. Please feel free call me with any questions at [phone] or contact me through [facility]   Sincerely, Don Cortez MD Board Certified, Physical Medicine and Rehabilitation Specializing in [address] [facility], Spine Medicine and [address] [facility]

## (undated) NOTE — LETTER
AUTHORIZATION FOR SURGICAL OPERATION OR OTHER PROCEDURE    1. I hereby authorize Dr. Gwen Martinez and the [facility] staff assigned to my case to perform the following operation and/or procedure at the [facility]:    Right shoulder injection    2. My physician has explained the nature and purpose of the operation or other procedure, possible alternative methods of treatment, the risks involved, and the possibility of complication to me. I acknowledge that no guarantee has been made as to the result that may be obtained. 3.  I recognize that, during the course of this operation, or other procedure, unforseen conditions may necessitate additional or different procedure than those listed above. I, therefore, further authorize and request that the above named physician, his/her physician assistants or designees perform such procedures as are, in his/her professional opinion, necessary and desirable. 4.  Any tissue or organs removed in the operation or other procedure may be disposed of by and at the discretion of the [facility] staff and [facility]. 5.  I understand that in the event of a medical emergency, I will be transported by local paramedics to [facility] or other hospital emergency department. 6.  I certify that I have read and fully understand the above consent to operation and/or other procedure. 7.  I acknowledge that my physician has explained sedation/analgesia administration to me including the risks and benefits. I consent to the administration of sedation/analgesia as may be necessary or desirable in the judgement of my physician. Witness signature: ___________________________________________________ Date:  ______/______/_____                    Time:  ________ A. M.  P.MMatias Villegas  10/24/1955  [id number]         Patient signature:  ___________________________________________________               Statement of Physician  My signature below affirms that prior to the time of the procedure, I have explained to the patient and/or his/her guardian, the risks and benefits involved in the proposed treatment and any reasonable alternative to the proposed treatment. I have also explained the risks and benefits involved in the refusal of the proposed treatment and have answered the patient's questions.                         Date:  ______/______/_______  Provider                      Signature:  __________________________________________________________       Time:  ___________ AMatiasM    P.M.

## (undated) NOTE — LETTER
7/20/2018              Jing Irving        [address] [address]         Dear Marissa Part records indicate that the blood test ordered for you by Danielle Smiley MD  has not been done.   If you have, in fact, already completed the

## (undated) NOTE — MR AVS SNAPSHOT
[address]  [phone]               Thank you for choosing us for your health care visit with Maya Kirk MD.  We are glad to serve you and happy to provide you with this summary of your visit. your appointment immediately. However, if you are unsure about the requirements for authorization, please wait 5-7 days and then contact your physician's   office.  At that time, you will be provided with any authorization numbers or be assured that none ar Take 1 capsule (60 mg total) by mouth every morning.    Commonly known as:  VYVANSE           Losartan Potassium-HCTZ 100-25 MG Tabs   TAKE 1 TABLET BY MOUTH DAILY   Commonly known as:  HYZAAR           Metoprolol Succinate ER 25 MG Tb24   TAKE 1 TABLET BY

## (undated) NOTE — LETTER
AUTHORIZATION FOR SURGICAL OPERATION OR OTHER PROCEDURE    1.  I hereby authorize Dr. Dnezel Marsh and [address] [place] [address] staff assigned to my case to perform the following operation and/or procedure at the [address] [place] [address]:    ____Endometrial Biopsy_____ Patient signature:  ___________________________________________________             Relationship to Patient:             Parent    Responsible person                            Spouse  In case of minor or                     Other  _____________   Incompet

## (undated) NOTE — Clinical Note
Monika Zhang has been very tired recently. shes taking her thyroid med. Has signs and symptoms of sleep apnea. She may also benefit from a sleep study. Can you please put in an order if you agree.   Thanks [facility]

## (undated) NOTE — LETTER
4/6/2023          To Whom It May Concern:    Deb Horton is currently under my medical care and may not return to work at this time. Please excuse Jing for 5 days. She may return to work on 4/11/23. Activity is restricted as follows: none. If you require additional information please contact our office.         Sincerely,    Calvin Amador, APRN

## (undated) NOTE — LETTER
11/14/2024      To Whom It May Concern:    Jing Irving is currently under my medical care. Please excuse her from work from 11/13/2024-11/15/2024.  If you require additional information please contact our office, [phone].      Sincerely,    Israel Guadalupe MD  [address] [place] [id number]  Ph: [phone]  Fax: [phone]       Document generated by:  Anika SCHREIBER RN

## (undated) NOTE — LETTER
10/2/2018          To Whom It May Concern:    Lisette Mcdaniels is currently under my medical care and may not return to work at this time. Please excuse Lonia Andok for 2 days. She may return to work on 10/4/18. Activity is restricted as follows: none.     If yo